# Patient Record
Sex: FEMALE | Race: BLACK OR AFRICAN AMERICAN | NOT HISPANIC OR LATINO | Employment: UNEMPLOYED | URBAN - METROPOLITAN AREA
[De-identification: names, ages, dates, MRNs, and addresses within clinical notes are randomized per-mention and may not be internally consistent; named-entity substitution may affect disease eponyms.]

---

## 2017-07-24 ENCOUNTER — HISTORICAL (OUTPATIENT)
Dept: ADMINISTRATIVE | Facility: HOSPITAL | Age: 57
End: 2017-07-24

## 2017-09-06 ENCOUNTER — HISTORICAL (OUTPATIENT)
Dept: NUTRITION | Facility: HOSPITAL | Age: 57
End: 2017-09-06

## 2018-03-09 ENCOUNTER — HISTORICAL (OUTPATIENT)
Dept: ADMINISTRATIVE | Facility: HOSPITAL | Age: 58
End: 2018-03-09

## 2019-11-05 ENCOUNTER — HISTORICAL (OUTPATIENT)
Dept: ADMINISTRATIVE | Facility: HOSPITAL | Age: 59
End: 2019-11-05

## 2019-11-05 LAB
BUN SERPL-MCNC: 10 MG/DL (ref 7–18)
CALCIUM SERPL-MCNC: 9.3 MG/DL (ref 8.5–10.1)
CHLORIDE SERPL-SCNC: 104 MMOL/L (ref 98–107)
CO2 SERPL-SCNC: 29 MMOL/L (ref 21–32)
CREAT SERPL-MCNC: 0.8 MG/DL (ref 0.6–1.3)
CREAT/UREA NIT SERPL: 12
EST. AVERAGE GLUCOSE BLD GHB EST-MCNC: 226 MG/DL
GLUCOSE SERPL-MCNC: 293 MG/DL (ref 74–106)
HBA1C MFR BLD: 9.5 % (ref 4.2–6.3)
POTASSIUM SERPL-SCNC: 3.5 MMOL/L (ref 3.5–5.1)
SODIUM SERPL-SCNC: 138 MMOL/L (ref 136–145)

## 2020-08-18 ENCOUNTER — HISTORICAL (OUTPATIENT)
Dept: INTERNAL MEDICINE | Facility: CLINIC | Age: 60
End: 2020-08-18

## 2021-08-11 ENCOUNTER — HISTORICAL (OUTPATIENT)
Dept: ADMINISTRATIVE | Facility: HOSPITAL | Age: 61
End: 2021-08-11

## 2021-11-22 ENCOUNTER — HISTORICAL (OUTPATIENT)
Dept: RADIOLOGY | Facility: HOSPITAL | Age: 61
End: 2021-11-22

## 2022-02-28 ENCOUNTER — HISTORICAL (OUTPATIENT)
Dept: FAMILY MEDICINE | Facility: CLINIC | Age: 62
End: 2022-02-28

## 2022-02-28 LAB
ABS NEUT (OLG): 7.32 (ref 2.1–9.2)
ALBUMIN SERPL-MCNC: 3.3 G/DL (ref 3.4–4.8)
ALBUMIN/GLOB SERPL: 0.8 {RATIO} (ref 1.1–2)
ALP SERPL-CCNC: 167 U/L (ref 40–150)
ALT SERPL-CCNC: 15 U/L (ref 0–55)
AST SERPL-CCNC: 14 U/L (ref 5–34)
BASOPHILS # BLD AUTO: 0.1 10*3/UL (ref 0–0.2)
BASOPHILS NFR BLD AUTO: 1 %
BILIRUB SERPL-MCNC: 0.4 MG/DL
BILIRUBIN DIRECT+TOT PNL SERPL-MCNC: 0.1 (ref 0–0.5)
BILIRUBIN DIRECT+TOT PNL SERPL-MCNC: 0.3 (ref 0–0.8)
BUN SERPL-MCNC: 15.9 MG/DL (ref 9.8–20.1)
CALCIUM SERPL-MCNC: 9.8 MG/DL (ref 8.7–10.5)
CHLORIDE SERPL-SCNC: 103 MMOL/L (ref 98–107)
CO2 SERPL-SCNC: 28 MMOL/L (ref 23–31)
CREAT SERPL-MCNC: 0.99 MG/DL (ref 0.55–1.02)
CREAT UR-MCNC: 56.4 MG/DL (ref 45–106)
EOSINOPHIL # BLD AUTO: 0.1 10*3/UL (ref 0–0.9)
EOSINOPHIL NFR BLD AUTO: 1 %
ERYTHROCYTE [DISTWIDTH] IN BLOOD BY AUTOMATED COUNT: 12.3 % (ref 11.5–14.5)
EST. AVERAGE GLUCOSE BLD GHB EST-MCNC: 309.2 MG/DL
FLAG2 (OHS): 70
FLAG3 (OHS): 80
FLAGS (OHS): 80
GLOBULIN SER-MCNC: 4.4 G/DL (ref 2.4–3.5)
GLUCOSE SERPL-MCNC: 382 MG/DL (ref 82–115)
HBA1C MFR BLD: 12.4 %
HCT VFR BLD AUTO: 40.1 % (ref 35–46)
HEMOLYSIS INTERF INDEX SERPL-ACNC: 1
HGB BLD-MCNC: 13.5 G/DL (ref 12–16)
ICTERIC INTERF INDEX SERPL-ACNC: 1
IMM GRANULOCYTES # BLD AUTO: 0.03 10*3/UL
IMM GRANULOCYTES NFR BLD AUTO: 0 %
LIPEMIC INTERF INDEX SERPL-ACNC: 8
LOW EVENT # SUSPECT FLAG (OHS): 80
LYMPHOCYTES # BLD AUTO: 2.1 10*3/UL (ref 0.6–4.6)
LYMPHOCYTES NFR BLD AUTO: 21 %
MANUAL DIFF? (OHS): NO
MCH RBC QN AUTO: 28.5 PG (ref 26–34)
MCHC RBC AUTO-ENTMCNC: 33.7 G/DL (ref 31–37)
MCV RBC AUTO: 84.8 FL (ref 80–100)
MICROALBUMIN UR-MCNC: 80.3
MICROALBUMIN/CREAT RATIO PNL UR: 142.4 (ref 0–30)
MO BLASTS SUSPECT FLAG (OHS): 40
MONOCYTES # BLD AUTO: 0.4 10*3/UL (ref 0.1–1.3)
MONOCYTES NFR BLD AUTO: 4 %
NEUTROPHILS # BLD AUTO: 7.32 10*3/UL (ref 2.1–9.2)
NEUTROPHILS NFR BLD AUTO: 72 %
NRBC BLD AUTO-RTO: 0 % (ref 0–0.2)
PLATELET # BLD AUTO: 277 10*3/UL (ref 130–400)
PLATELET CLUMPS SUSPECT FLAG (OHS): 10
PMV BLD AUTO: 11.9 FL (ref 7.4–10.4)
POTASSIUM SERPL-SCNC: 4 MMOL/L (ref 3.5–5.1)
PROT SERPL-MCNC: 7.7 G/DL (ref 5.8–7.6)
RBC # BLD AUTO: 4.73 10*6/UL (ref 4–5.2)
SODIUM SERPL-SCNC: 138 MMOL/L (ref 136–145)
TSH SERPL-ACNC: 0.4 M[IU]/L (ref 0.35–4.94)
VIT B12 SERPL-MCNC: 644 PG/ML (ref 213–816)
WBC # SPEC AUTO: 10.1 10*3/UL (ref 4.5–11)

## 2022-04-10 ENCOUNTER — HISTORICAL (OUTPATIENT)
Dept: ADMINISTRATIVE | Facility: HOSPITAL | Age: 62
End: 2022-04-10

## 2022-04-20 ENCOUNTER — HISTORICAL (OUTPATIENT)
Dept: FAMILY MEDICINE | Facility: CLINIC | Age: 62
End: 2022-04-20

## 2022-04-20 ENCOUNTER — HISTORICAL (OUTPATIENT)
Dept: ADMINISTRATIVE | Facility: HOSPITAL | Age: 62
End: 2022-04-20

## 2022-04-20 LAB
CHOLEST SERPL-MCNC: 269 MG/DL
CHOLEST/HDLC SERPL: 8 {RATIO} (ref 0–5)
HDLC SERPL-MCNC: 34 MG/DL (ref 35–60)
LDLC SERPL CALC-MCNC: 200 MG/DL (ref 50–140)
TRIGL SERPL-MCNC: 177 MG/DL (ref 37–140)
VLDLC SERPL CALC-MCNC: 35 MG/DL

## 2022-04-29 VITALS
HEIGHT: 62 IN | HEIGHT: 62 IN | BODY MASS INDEX: 29.62 KG/M2 | WEIGHT: 160.94 LBS | OXYGEN SATURATION: 100 % | SYSTOLIC BLOOD PRESSURE: 146 MMHG | BODY MASS INDEX: 31.89 KG/M2 | SYSTOLIC BLOOD PRESSURE: 159 MMHG | HEIGHT: 62 IN | BODY MASS INDEX: 29.94 KG/M2 | WEIGHT: 173.31 LBS | DIASTOLIC BLOOD PRESSURE: 92 MMHG | WEIGHT: 162.69 LBS | OXYGEN SATURATION: 100 % | DIASTOLIC BLOOD PRESSURE: 115 MMHG

## 2022-04-30 NOTE — PROGRESS NOTES
MNT OUTPATIENT EDUCATION   Nutrition Diagnosis  Problem:   Food & Nutrition Related Knowledge Deficit       Related to:  Medical Diagnosis  As Evidenced by:  Limited prior knowledge / health professional referral    Nutrition Prescription / Intervention  MNT Education Completed on:    Diabetes:  Instructed patient on carbohydrate containing food groups (starches, fruits, milk); portion sizes / measuring food; reading food labels; low fat meat selections; choosing healthier fats; increasing activity; sick day management; low fat tips on dining out; good eating habits; cooking healthier meals.       Appropriate meal plan provided: 2705-6995 calories     Level of Understanding:   good  Expected Compliance:   good  Appropriate Handouts Given: (age specific / literacy): yes  Barriers to Learning: none    Nutrition Prescription:  Diet order prescribed per MD / RD    Goal:  Patient will adhere to prescribed MNT meal plan as instructed by RD    Monitoring / Evaluation    RBERNABE will monitor: TONI

## 2022-05-02 ENCOUNTER — TELEPHONE (OUTPATIENT)
Dept: FAMILY MEDICINE | Facility: CLINIC | Age: 62
End: 2022-05-02

## 2022-05-02 NOTE — HISTORICAL [facility] CERNER
This is a historical note converted from Daniel. Formatting and pictures may have been removed.  Please reference Daniel for original formatting and attached multimedia. Chief Complaint  L breast bx  History of Present Illness  [facility] 0?focal symmetry in the left breast subsequent ultrasound showed an 8 mm?2 x 5 mm?was dilated duct proximal to mass  Review of Systems  No nipple discharge no nipple retraction?no bloody discharge from the nipple no family history of breast cancer  Physical Exam  Vitals & Measurements  T:?36.5? ?C ?(Oral)? HR:?78?(Peripheral)? BP:?159/115? HT:?157.48?cm? HT:?157.48?cm? WT:?78.6?kg? WT:?78.6?kg? BMI:?31.69?  No palpable breast mass  no palpable mass in the axilla  bilaterally?nipple retraction or skin changes  Assessment/Plan  Ordered:  US Breast Left Limited, Routine, 17 14:00:00 CDT, left breast mass, None, Ambulatory, Schedule this test, 17 14:00:00 CDT  Ultrasound in clinic today demonstrated no abnormal mass intraductally.  A thorough ultrasound of the entire left breast was completed and sent to the radiologist for review  The previous area of interest in the doubt in comparison to the?imaging from [facility] is no longer dilated there was nothing intraductal that was obvious on ultrasound  Patient will follow up in 6 months with repeat ultrasound   Problem List/Past Medical History  Obesity  Historical  No historical problems  Procedure/Surgical History  Hysterectomy (),  section, IZZY.  Medications  HYDROCHLOROTHIAZIDE 12.5 MG, 12.5 mg, 1 tab(s), Oral, Daily,? ?Not taking  hydroCHLOROthiazide 12.5 mg oral capsule, 12.5 mg, 1 cap(s), Oral, Daily, 2 refills,? ?Not taking  metformin 500 mg oral tablet, 500 mg, 1 tab(s), Oral, BID, 2 refills,? ?Not taking  NAPROXEN 500 MG TABLET, 500 mg, 1 tab(s), Oral, BID,? ?Not taking  Norvasc 5 mg oral tablet, 5 mg, 1 tab(s), Oral, Daily, 2 refills,? ?Not  taking  Allergies  No Known Medication Allergies  Social History  Alcohol - Denies Alcohol Use  Never  Employment/School  Unemployed  Home/Environment  Lives with Spouse. Alcohol abuse in household: No. Substance abuse in household: No. Smoker in household: No. Feels unsafe at home: No. Safe place to go: Yes. Family/Friends available for support: Yes.  Nutrition/Health  Regular, Sleeping concerns: No. Feels highly stressed: No.  Substance Abuse - Denies Substance Abuse  Never  Tobacco - Denies Tobacco Use  Never smoker  Family History  : Mother and Father.

## 2022-05-02 NOTE — HISTORICAL [facility] CERNER
This is a historical note converted from Daniel. Formatting and pictures may have been removed.  Please reference Daniel for original formatting and attached multimedia. Chief Complaint  DM2, HTN, HLD  History of Present Illness  60 Years?old??Female?presents to  Service Clinic?for routine f/u for DM2, HTN, HLD.??Patient ?has past medical history of HTN, HLD, DM2, NPDR , and DM2 neuropathy. Pt states she is having issues with her left eye requiring retinal specialist that she sees on 8/20/2021 at [facility]. Pt had procedures done on her right eye ( injections) by [facility]?Ophthalmology?and not willing to have anything else done until see by retinal specialist. Pt states right eyesight is worse.? Pt has been noncompliant with medical management for HTN, HLD, and DM2?but pt states she is willing to start medication with worsening of vision. Pt would like a handicap decal because she is having issues walking less than 200 ft without stopping and has mod to severe OA in past requiring injections. Pt states pain in knees intermittent but does not take medications.  Patient denies chest pain, shortness of breath,?dyspnea on exertion, palpitations, peripheral edema,?abdominal pain, nausea, vomiting,?diarrhea,?constipation, fatigue, fever, chills,?dysuria,? hematuria, melena,?or hematochezia.  ?   6/24/2021 visit  60 Years?old??Female?presents to C2???for routine f/u. ?Patient ?has past medical history of HTN, HLD, DM2, NPDR , and DM2 neuropathy.  Discussed with patient labs from 12/15/2020: Hemoglobin A1c 10.9 (previous A1c 12.1 on 8/18/2020); fasting lipid panel: Total cholesterol elevated 260, HDL 43, triglyceride elevated 164, and LDL elevated 184; urine microalbumin 21.8 and CBC within normal limits. Patient states that she is?more compliant with?a low?sodium, low fat and low carbohydrate eating habit. Patient states that she wants to meet with [facility] Dietician and is currently? modifying her eating habits on a  daily basis to decrease her diabetes and cholesterol issues. ?Blood pressure today 144/78 and slightly elevated. ?Patient states that she has?avoided?food containing?sodium?and that she does not add additional salt to her food. ??Patient is still noncompliant to medical recommendations for medication management of her uncontrolled diabetes mellitus type 2 and uncontrolled hyperlipidemia.? Discussed once again that these 2 factors as well as her age put her at a higher risk for stroke, myocardial infarction and death.? Patient states that she wants to try diet and exercise for the next 3 to 4 months and that she will consider taking medications at that time.  Patient denies chest pain, shortness of breath,?dyspnea on exertion, palpitations, peripheral edema,?abdominal pain, nausea, vomiting,?diarrhea,?constipation, fatigue, fever, chills,?dysuria,? hematuria, melena,?or hematochezia.  Counselling:  -Patient educated about?disease specific goals?(HTN:?Blood pressure?<140/90 except for ?patients with?DM2,?renal disease,?and CVD?BP?<130/80; HLD:Goal TChol <200,?LDL <130and  ? Triglycerides <150;?DM2:?HgA1c <7).  -Patient?educated about and encouraged to comply with disease specific?lifestyle modifications?:?HTN -low-sodium eating habit;?DM2-low carbohydrate eating habit; HLD-low-fat eating habit;?CVD?- Follow all lifestyle modifications and exercise for 30 minutes 5 days/week  -Patient educated?about?cardiovascular disease?prevention using?an aspirin 81 mg daily?(if not allergic to NSAIDs or severe renal impairment)-male:?44 yo or above and female: 54 yo or above  -Patient instructed to keep daily BP?log for review at next appointment  -Patient instructed to keep?daily?blood sugar log for review at next appointment  -Patient counseled to?keep follow-up appointment with disease specific specialist  -Relevant educational materials provided  ?  ?   Last visit 12/15/2020  Pt is a??60 Years?old?Female?in?to establish with  PCP. Pt has past medical history of HTN, HLD, DM2, NPDR , and DM2 neuropathy. ?Former patient of Dr. Lelo Henry. Discussed labs from 12/15/2020. ??Patient states that she was discharged from internal medicine clinic because of?noncompliance with medications.? Patient states she was prescribed Metformin 1000 mg p.o. twice daily for diabetes,?lisinopril/HCTZ 20/25 mg p.o.?daily?for?hypertension?and atorvastatin 40 mg?p.o. nightly?for which she did not take any of these medications.? Patient states she does not understand why she cannot control her?diabetes, hypertension, and hyperlipidemia?without medications.? Discussed in detail?low-sodium, low-carb?and low-fat eating habits?as well as patients caloric intake for height of approximately 5 feet?2 inches?which is?1400?joe.? Discussed with patient?going to the American Diabetic Association?website?to find healthy?alternatives and?possibly joining weight watchers?in order to lose?approximately 22 pounds?to decrease her BMI to 25?and possibly decrease her systolic blood pressure by?10?and decreased diastolic?blood pressure by 5.?Discussed in great detail HTN, pathophysiology, causes, adverse effects of disease (CVA, Angina/ MI, LVH, renal insufficiency/failure, headaches), dietary habits, exercise, goals for HTN and medication treatment.??Discussed low sodium diet <2000 mg po daily and ?DASH eating habit with patient. Pt advised to eat fresh or frozen ?vegetables and avoid vegetables in cans. Pt advised not to add salt or seasoned salts to foods and avoid foods with hidden salt (list discussed with patient).?Discussed with patient?low carbohydrate ADA diet,?pathophysiology of diabetes,?adverse effects of DM2 on overall health (MI, CVA, renal dysfunction, blindness, amputations, and neuropathy), treatments?-non pharmacological and pharmacological with medication side effects,?need for yearly?eye and foot examination,?and need for immunizations.? Patient seems  ?motivated?to start a healthy eating plan.? Discussed with patient and strongly advised that she still?comply with medication?recommendations.? Patient stated that she would like to try?diet and exercise?prior to starting?any type of medication.  DM2: HgA1c?(12/5/2020)?was??10.9. Pt is?moderately adheres to?ADA diet. ?  HTN:?Pt is?compliant with low sodium diet.?  HLD:?Pt is?compliant with low fat eating habit and exercise.?  Review of Systems  GENERAL:?Negative for abnormal weight loss or weight gain, fatigue, fever, or chills. Negative except for stated in HPI.  HEENT:??Negative for head trauma,?rhinorrhea, ?nasal congestion, sore throat or hearing deficit. Negative except for stated in HPI.  CARDIOVASCULAR: Negative for? chest pain, palpitations, WILLIS, PND, orthopnea, peripheral edema or syncope. Negative except for stated in HPI.  RESPIRATORY: Negative for SOB, WILLIS, cough or?wheezing.? Negative except for stated in HPI.  GASTROINTESTINAL: Negative for abdominal pain, nausea,??vomiting, diarrhea, constipation, heartburn, ?hematochezia? or melena.? Negative except for stated in HPI.  GENITOURINARY: Negative for dysuria, hematuria, urinary frequency, urinary urgency, urinary hesitancy or flank pain. Negative except for stated in HPI.  ENDOCRINE: Negative for fatigue, heat intolerance, cold intolerance, polyuria, polydipsia, or polyphagia.? Negative except for stated in HPI.  PSYCHOLOGICAL: Negative for depression, anxiety, suicidal or homicidal ideations, hallucinations??or?be.? Negative except for?stated in HPI.  MUSCULOSKELETAL:?? Negative except for?stated in HPI.  INTEGUMENT: Negative for rash?or lesions. Negative except for stated in HPI.  NEUROLOGY: Negative for headaches, dizziness, numbness, tingling,?vertigo?or gait disturbances. Negative except for stated in HPI.  Rest of 12 point ROS is negative except for HPI.  ?  Physical Exam  Vitals & Measurements  T:?36.5? ?C (Oral)? HR:?90(Peripheral)? RR:?20?  SpO2:?100%?  HT:?157.00?cm? WT:?73.800?kg? BMI:?29.94?  BP: Right arm 192/99; BP: left arm 172/119 after 10 minutes recheck was 172/92 in right arm  GENERAL:?Alert and oriented to person, place, time and situation,?NAD  HEENT: NCAT, Pupils equally round and reactive to light accommodation,?normal sclera, ?moist mucous membranes,?oropharynx normal,?normal nasal turbinates/ nares,??TM clear bilaterally, neck?supple,?thyroid normal,?no lymphadenopathy.  CARDIOVASCULAR:?Regular rateand?rhythm,? S1 and S2 normal;?no murmurs, no rubs, or no gallops; no carotid bruit.  RESPIRATORY:??Lungs clear to auscultation bilaterally;?no wheezes,?no rhonchi,?or? no crackles  ABDOMEN: Soft/ Nontender/ Nondistended; Bowel Sounds x4 - normoactive; no abdominal pulsatile mass, no masses, no hernias  EXTREMITY:? No clubbing, no cyanosis and?no peripheral edema; Dorsalis pedis and tibial ?pulses 2+  MUSCULOSKELETAL: FROM of Upper and Lower extremities; Strength 5/5 of Upper and Lower extremities  PSYCHOLOGICAL: Good judgement and insight; normal affect and mood.  NEUROLOGICAL: Normal gait and ?normal sensation;?no focal deficits; Cranial Nerves 2 -12 grossly intact  ?DM FOOT EXAM:?Normal sensation and normal proprioception,?no calluses,?no lesions_  Assessment/Plan  1.?Diabetes mellitus type 2, uncontrolled?E11.65  Chronic issue/ uncontrolled  Last hemoglobin A1c (12/15/2020):? 10.9 . Previous HgA1c (8/18/2020): 12.1  Urine microalbumin?(12/15/2020):? 21.8 ; Urine microalbumin (8/18/2020): 61.5  DM2 foot exam: 8/11/2021  DM2 ophthalmology?fundus exam:?6/23/2021 - PDR and HTN Stage III retinopathy; followed by [facility]  Medication?regimen:  Patient noncompliant with medical management of DM2 until todays exam 8/11/2021; Pt has verbally agreed to be compliant with medication management starting today 8/11/20  Start Invokamet 50/1000 mg po BID  DM2 guidance:  ACE inhibitor?or ARB?for renal protection:?Yes, started today  ASA 81 mg  p.o. daily?for CVD protection:?Yes,started today  Statin?for CVD protection:?Yes,started today  Continue low carbohydrate ADA eating habit  Continue to monitor CBS daily with routine:M, W, F- fasting; Tues and Thursday- bedtime; Sat and Sun- 2 hrs after lunch  Patient educated about signs and symptoms of hypoglycemia;?patient educated about?prevention?and management of hypoglycemia.  Patient expressed verbal understanding and agreement with the above medical management?and treatment  Referred to Diabetic Education for additional training on nutrition  Labs ordered?today?see below  Ordered:  canagliflozin-metformin, 1 tab(s), Oral, BIDWMeal, # 60 tab(s), 5 Refill(s), [facility], 157, cm, Height/Length Dosing, 08/11/21 14:29:00 CDT, 73.8, kg, Weight Dosing, 08/11/21 14:29:00 CDT  CBC w/ Auto Diff, Routine collect, *Est. 08/11/21 3:00:00 CDT, Blood, Order for future visit, *Est. Stop date 08/11/21 3:00:00 CDT, Lab Collect, HTN (hypertension)  HLD (hyperlipidemia)  Diabetes mellitus type 2, uncontrolled, 08/11/21 15:36:00 CDT  Clinic Follow up, *Est. 08/25/21 3:00:00 CDT, Order for future visit, HLD (hyperlipidemia)  HTN (hypertension)  Diabetes mellitus type 2, uncontrolled  NPDR (nonproliferative diabetic retinopathy), [facility]  Comprehensive Metabolic Panel, Routine collect, *Est. 08/11/21 3:00:00 CDT, Blood, Order for future visit, *Est. Stop date 08/11/21 3:00:00 CDT, Lab Collect, HTN (hypertension)  HLD (hyperlipidemia)  Diabetes mellitus type 2, uncontrolled, 08/11/21 15:36:00 CDT  Microalbum/Creatinine Ratio Urine (Microalb/Creat), Routine collect, Urine, Order for future visit, *Est. 08/11/21 3:00:00 CDT, *Est. Stop date 08/11/21 3:00:00 CDT, Nurse collect, Diabetes mellitus type 2, uncontrolled  Office/Outpatient Visit Level 4 Established 31184 PC, HLD (hyperlipidemia)  HTN (hypertension)  Diabetes mellitus type 2, uncontrolled  NPDR (nonproliferative  diabetic retinopathy)  Screening for malignant neoplasm of breast  Screening for malignant neoplasm of colon  Medical non-compliance, [facility] F...  ?  2.?HLD (hyperlipidemia)?E78.5  Chronic issue/ uncontrolled  Continue?low fat diet.  Noncompliant with medical management of hyperlipidemia until 8/11/2021  Pt has verbally agreed to be compliant with medication management starting today 8/11/2021  Encourage exercise for 30 minutes 5 days/ week (total 150 minutes/week)  Start atorvastatin? 40 mg po daily  Labs ordered?today?see below  ?  Ordered:  atorvastatin, 40 mg = 1 tab(s), Oral, Daily, # 30 tab(s), 5 Refill(s), Pharm[facility]: [facility], 157, cm, Height/Length Dosing, 08/11/21 14:29:00 CDT, 73.8, kg, Weight Dosing, 08/11/21 14:29:00 CDT  CBC w/ Auto Diff, Routine collect, *Est. 08/11/21 3:00:00 CDT, Blood, Order for future visit, *Est. Stop date 08/11/21 3:00:00 CDT, Lab Collect, HTN (hypertension)  HLD (hyperlipidemia)  Diabetes mellitus type 2, uncontrolled, 08/11/21 15:36:00 CDT  Clinic Follow up, *Est. 08/25/21 3:00:00 CDT, Order for future visit, HLD (hyperlipidemia)  HTN (hypertension)  Diabetes mellitus type 2, uncontrolled  NPDR (nonproliferative diabetic retinopathy), [facility]  Comprehensive Metabolic Panel, Routine collect, *Est. 08/11/21 3:00:00 CDT, Blood, Order for future visit, *Est. Stop date 08/11/21 3:00:00 CDT, Lab Collect, HTN (hypertension)  HLD (hyperlipidemia)  Diabetes mellitus type 2, uncontrolled, 08/11/21 15:36:00 CDT  Lipid Panel, Routine collect, *Est. 08/11/21 3:00:00 CDT, Blood, Order for future visit, *Est. Stop date 08/11/21 3:00:00 CDT, Lab Collect, HTN (hypertension)  HLD (hyperlipidemia), 08/11/21 15:36:00 CDT  Office/Outpatient Visit Level 4 Established [id number] PC, HLD (hyperlipidemia)  HTN (hypertension)  Diabetes mellitus type 2, uncontrolled  NPDR (nonproliferative diabetic retinopathy)  Screening for malignant neoplasm of breast   Screening for malignant neoplasm of colon  Medical non-compliance, [facility] F...  ?  3.?HTN (hypertension)?I10  Chronic issue/ uncontrolled?  Goal BP <140/90  Continue to?monitor BP daily and bring in log at next visit  Continue low sodium eating habit of ?less than 2 grams/ day/Dash eating habit  Encourage exercise for 30 minutes 5 days/ week (total 150 minutes/week)  Start amlodipine- benazepril 10/20 mg po daily  Labs ordered?today?see below  Ordered:  amlodipine-benazepril, 1 cap(s), Oral, Daily, Hold for BP less than or equal to 90/50., # 30 cap(s), 5 Refill(s), Pharmacy: [facility], 157, cm, Height/Length Dosing, 08/11/21 14:29:00 CDT, 73.8, kg, Weight Dosing, 08/11/21 14:29:00 CDT  CBC w/ Auto Diff, Routine collect, *Est. 08/11/21 3:00:00 CDT, Blood, Order for future visit, *Est. Stop date 08/11/21 3:00:00 CDT, Lab Collect, HTN (hypertension)  HLD (hyperlipidemia)  Diabetes mellitus type 2, uncontrolled, 08/11/21 15:36:00 CDT  Clinic Follow up, *Est. 08/25/21 3:00:00 CDT, Order for future visit, HLD (hyperlipidemia)  HTN (hypertension)  Diabetes mellitus type 2, uncontrolled  NPDR (nonproliferative diabetic retinopathy), [facility]  Comprehensive Metabolic Panel, Routine collect, *Est. 08/11/21 3:00:00 CDT, Blood, Order for future visit, *Est. Stop date 08/11/21 3:00:00 CDT, Lab Collect, HTN (hypertension)  HLD (hyperlipidemia)  Diabetes mellitus type 2, uncontrolled, 08/11/21 15:36:00 CDT  Hemoglobin A1C [facility], Routine collect, *Est. 08/11/21 3:00:00 CDT, Blood, Order for future visit, *Est. Stop date 08/11/21 3:00:00 CDT, Lab Collect, HTN (hypertension), 08/11/21 15:36:00 CDT  Lipid Panel, Routine collect, *Est. 08/11/21 3:00:00 CDT, Blood, Order for future visit, *Est. Stop date 08/11/21 3:00:00 CDT, Lab Collect, HTN (hypertension)  HLD (hyperlipidemia), 08/11/21 15:36:00 CDT  Office/Outpatient Visit Level 4 Established 49616 PC, HLD (hyperlipidemia)  HTN  (hypertension)  Diabetes mellitus type 2, uncontrolled  NPDR (nonproliferative diabetic retinopathy)  Screening for malignant neoplasm of breast  Screening for malignant neoplasm of colon  Medical non-compliance, [facility] F...  Thyroid Stimulating Hormone, Routine collect, *Est. 08/11/21 3:00:00 CDT, Blood, Order for future visit, *Est. Stop date 08/11/21 3:00:00 CDT, Lab Collect, HTN (hypertension), 08/11/21 15:36:00 CDT  ?  4.?Medical non-compliance?Z91.19  ?Pt has been noncompliant with medical management of DM2, HTN, HLD wanting to attempt diet and exercise. Today, 8/11/2021, pt states she will comply with new medication regimen. She has been started on antihypertensives, DM2 medications, a statin and ASA.  Ordered:  Office/Outpatient Visit Level 4 Established 44864 PC, HLD (hyperlipidemia)  HTN (hypertension)  Diabetes mellitus type 2, uncontrolled  NPDR (nonproliferative diabetic retinopathy)  Screening for malignant neoplasm of breast  Screening for malignant neoplasm of colon  Medical non-compliance, [facility] F...  ?  5.?NPDR (nonproliferative diabetic retinopathy)?E11.3299  ?Keep?follow up appointment?with?[facility] clinic  Ordered:  Clinic Follow up, *Est. 08/25/21 3:00:00 CDT, Order for future visit, HLD (hyperlipidemia)  HTN (hypertension)  Diabetes mellitus type 2, uncontrolled  NPDR (nonproliferative diabetic retinopathy), [facility]  Office/Outpatient Visit Level 4 Established 43952 PC, HLD (hyperlipidemia)  HTN (hypertension)  Diabetes mellitus type 2, uncontrolled  NPDR (nonproliferative diabetic retinopathy)  Screening for malignant neoplasm of breast  Screening for malignant neoplasm of colon  Medical non-compliance, [facility] F...  ?  6.?Osteoarthritis of knees, bilateral?M17.0  Chronic issue  Pt request DMV parking decal  She states she has a h/o severe OA; this is the first time patient has discussed her knee OA and  Service Clinic has no radiographs of the  patients knees  XR Knee Bilateral standing ordered  Ordered:  XR Knees Bilateral Standing AP, Routine, *Est. 08/11/21 3:00:00 CDT, None, Ambulatory, Rad Type, Order for future visit, Osteoarthritis of knees, bilateral, Not Scheduled, *Est. 08/11/21 3:00:00 CDT  ?  7.?Screening for malignant neoplasm of breast?Z12.39  ?MMG ordered  Ordered:  MG Screening Bilateral, Routine, 08/11/21 15:32:00 CDT, Screening, None, Ambulatory, Rad Type, Order for future visit, Screening for malignant neoplasm of breast, Schedule this test, [facility], Follow Breast Imaging Order Set Protocol, 08/11/21 15:32:00...  Office/Outpatient Visit Level 4 Established 85020 PC, HLD (hyperlipidemia)  HTN (hypertension)  Diabetes mellitus type 2, uncontrolled  NPDR (nonproliferative diabetic retinopathy)  Screening for malignant neoplasm of breast  Screening for malignant neoplasm of colon  Medical non-compliance, [facility] F...  ?  8.?Screening for malignant neoplasm of colon?Z12.11  ?FIT ordered; FIT?kit given to the patient; and?the pt was instructed by [facility] staff on proper collection?and ?return of the kit ?to [facility]?main laboratory?within?2 weeks  Ordered:  Occult Blood Fecal Immunoassay, Routine collect, Stool, Order for future visit, *Est. 08/11/21 3:00:00 CDT, *Est. Stop date 08/11/21 3:00:00 CDT, Nurse collect, Screening for malignant neoplasm of colon  Office/Outpatient Visit Level 4 Established 68129 PC, HLD (hyperlipidemia)  HTN (hypertension)  Diabetes mellitus type 2, uncontrolled  NPDR (nonproliferative diabetic retinopathy)  Screening for malignant neoplasm of breast  Screening for malignant neoplasm of colon  Medical non-compliance, [facility] F...  ?  Orders:  aspirin, 81 mg = 1 tab(s), Chewed, Daily, # 30 tab(s), 5 Refill(s), Pharmacy: [facility], 157, cm, Height/Length Dosing, 08/11/21 14:29:00 CDT, 73.8, kg, Weight Dosing, 08/11/21 14:29:00 CDT  RTC in 2 wks for DM2 and  HTN  Referrals  [facility] Internal Referral to Diabetes Education, Specialty: Diabetes Education, Reason: 61 yo female with DM2 uncontrolled needing diabetic education regard nutrition- healthy diabetic foods and meal preps to control DM2. Please eval and treat., Refer To: Referral, Diabetes Education, [facility] Diabetes E...  Clinic Follow up, *Est. 21 3:00:00 CDT, Order for future visit, HLD (hyperlipidemia)  HTN (hypertension)  Diabetes mellitus type 2, uncontrolled  NPDR (nonproliferative diabetic retinopathy), [facility]   Problem List/Past Medical History  Ongoing  Diabetes  Diabetes mellitus type 2, uncontrolled  HLD (hyperlipidemia)  HTN (hypertension)  Medical non-compliance  NPDR (nonproliferative diabetic retinopathy)  Obesity  Well adult exam  Historical  No qualifying data  Procedure/Surgical History  Hysterectomy ()   section  IZZY   Medications  amlodipine-benazepril 10 mg-20 mg oral capsule, 1 cap(s), Oral, Daily, 5 refills  aspirin 81 mg oral tablet, CHEWABLE, 81 mg= 1 tab(s), Chewed, Daily, 5 refills  atorvastatin 40 mg oral tablet, 40 mg= 1 tab(s), Oral, Daily, 5 refills  Blood pressure cuff, See Instructions  diabetic supplies- alcohol pads, See Instructions, 11 refills  Diabetic supplies- lancets, See Instructions, 11 refills  diabetic supplies- test strips, See Instructions, 11 refills  Glucometer, See Instructions  Invokamet 50 mg-1000 mg oral tablet, 1 tab(s), Oral, BIDWMeal, 5 refills  phenylephrine 2.5% ophthalmic solution, 1 drop(s), Eye-Both, Once  tropicamide 1% ophthalmic soln, 1 drop(s), Eye-Both, Once  Allergies  No Known Allergies  No Known Medication Allergies  Social History  Abuse/Neglect  No, No, Yes, 2021  Alcohol - Denies Alcohol Use, 2016  Never, 2021  Employment/School  Unemployed, 2020  Exercise  Exercise duration: 0., 2021  Exercise type: denies., 2020  Financial/Legal Situation  None,  12/15/2020  Home/Environment  Lives with Spouse. Living situation: Home/Independent., 2020    Never in , 12/15/2020  Nutrition/Health  Regular, Good, 2020  Sexual  Gender Identity Identifies as female., 2020  Spiritual/Cultural  Non denomination, 12/15/2020  Substance Use - Denies Substance Abuse, 2016  Never, 2020  Tobacco - Denies Tobacco Use, 2016  Never (less than 100 in lifetime), N/A, 2021  Family History  : Mother and Father.  Health Maintenance  Health Maintenance  ???Pending?(in the next year)  ??? ??OverDue  ??? ? ? ?Hypertension Maintenance-Medication Prescribed due??18??and every 1??year(s)  ??? ? ? ?Diabetes Maintenance-Foot Exam due??18??and every 1??year(s)  ??? ??Due?  ??? ? ? ?Breast Cancer Screening due??21??Unknown Frequency  ??? ? ? ?Colorectal Screening due??21??Unknown Frequency  ??? ? ? ?Zoster Vaccine due??21??Unknown Frequency  ??? ??Refused?  ??? ? ? ?Tetanus Vaccine due??21??and every 10??year(s)  ??? ??Due In Future?  ??? ? ? ?Diabetes Maintenance-HgbA1c not due until??12/15/21??and every 1??year(s)  ??? ? ? ?Hypertension Management-BMP not due until??12/15/21??and every 1??year(s)  ??? ? ? ?Diabetes Maintenance-Fasting Lipid Profile not due until??12/15/21??and every 1??year(s)  ??? ? ? ?Diabetes Maintenance-Serum Creatinine not due until??12/15/21??and every 1??year(s)  ??? ? ? ?Obesity Screening not due until??22??and every 1??year(s)  ??? ? ? ?Alcohol Misuse Screening not due until??22??and every 1??year(s)  ??? ? ? ?Blood Pressure Screening not due until??22??and every 1??year(s)  ??? ? ? ?Hypertension Management-Blood Pressure not due until??22??and every 1??year(s)  ??? ? ? ?Diabetes Maintenance-Eye Exam not due until??22??and every 1??year(s)  ???Satisfied?(in the past 1 year)  ??? ??Satisfied?  ??? ? ? ?ADL Screening on??21.??Satisfied by  Elias LPN, [place]  ??? ? ? ?Alcohol Misuse Screening on??02/22/21.??Satisfied by Cassie Gallardo LPN  ??? ? ? ?Aspirin Therapy for CVD Prevention on??08/11/21.??Satisfied by Darian Burger MD  ??? ? ? ?Blood Pressure Screening on??02/22/21.??Satisfied by Cassie Gallardo LPN  ??? ? ? ?Body Mass Index Check on??08/11/21.??Satisfied by Grady Griffin LPN  ??? ? ? ?Depression Screening on??08/11/21.??Satisfied by Grady Griffin LPN  ??? ? ? ?Diabetes Maintenance-Eye Exam on??07/02/21.??Satisfied by Camilla Carrasco  ??? ? ? ?Diabetes Maintenance-Microalbumin on??12/15/20.??Satisfied by Cecy Almonte  ??? ? ? ?Diabetes Maintenance-Fasting Lipid Profile on??12/15/20.??Satisfied by Cecy Almonte  ??? ? ? ?Diabetes Maintenance-HgbA1c on??12/15/20.??Satisfied by Cecy Almonte  ??? ? ? ?Diabetes Maintenance-Serum Creatinine on??12/15/20.??Satisfied by Cecy Almonte  ??? ? ? ?Diabetes Screening on??12/15/20.??Satisfied by Cecy Almonte  ??? ? ? ?Hypertension Management-Education on??08/11/21.??Satisfied by Darian Burger MD  ??? ? ? ?Influenza Vaccine on??03/30/21.??Satisfied by Shell Lozano  ??? ? ? ?Lipid Screening on??12/15/20.??Satisfied by Cecy Almonte  ??? ? ? ?Obesity Screening on??08/11/21.??Satisfied by Grady Griffin LPN  ??? ??Refused?  ??? ? ? ?Tetanus Vaccine on??08/11/21.??Recorded by Grady Griffin LPN??Reason: Patient Refuses  ??? ? ? ?Zoster Vaccine on??08/11/21.??Recorded by Grady Griffin LPN??Reason: Patient Refuses  ?

## 2022-05-02 NOTE — HISTORICAL [facility]ER
This is a historical note converted from Daniel. Formatting and pictures may have been removed.  Please reference Daniel for original formatting and attached multimedia. Chief Complaint  needs PCP  History of Present Illness  58-year-old -American female presents?to reestablish care in the internal medicine clinic; she had previously been seeing nurse practitioner Ashley Angela. ?She has a past medical history of hypertension, hyperlipidemia,?type 2 diabetes mellitus,?and medication noncompliance.? Patient states she has all of her medications at home?but has chosen not to take any of them?for the past year or so.? Denies any complaints today such as chest pain, shortness of breath, fatigue,?etc.  Patient declined mammogram?and referral to gynecology clinic for pelvic exam, as well as all vaccinations.  Review of Systems  ????Constitutional: No fever, No chills, No weakness, No fatigue.  ?????Eye: No recent visual problem.  ?????Respiratory: No shortness of breath, No cough, No sputum production, No wheezing.  ?????Cardiovascular: No chest pain, No palpitations, No peripheral edema.  ?????Gastrointestinal: No nausea, No vomiting, No diarrhea, No constipation, No heartburn, No abdominal pain.  ?????Genitourinary: No dysuria.  ?????Endocrine: No excessive thirst, No polyuria, No cold intolerance, No heat intolerance.  ?????Musculoskeletal: No back pain, No joint pain, No decreased range of motion.  ?????Integumentary: No rash, No pruritus.  ?????Neurologic: Alert and oriented X4, No numbness, No tingling, No headache.  ?????Psychiatric: No anxiety, No depression.  Physical Exam  Vitals & Measurements  T:?36.7? ?C (Oral)? HR:?98(Peripheral)? RR:?18? BP:?192/115?  HT:?157?cm? WT:?74.7?kg? BMI:?30.31?  General: Alert and oriented, No acute distress.  ?????Eye: Pupils are equal, round and reactive to light, Extraocular movements are intact, Normal conjunctiva.  ?????HENT: Normocephalic, Oral mucosa is moist, No  pharyngeal erythema.  ?????Neck: Supple, Non-tender.  ?????Respiratory: Lungs are clear to auscultation, Respirations are non-labored.  ?????Cardiovascular: Normal rate, Regular rhythm, No murmur, Good pulses equal in all extremities, No edema.  ?????Gastrointestinal: Soft, Non-tender, Non-distended, Normal bowel sounds.  ?????Musculoskeletal: Normal range of motion, Normal strength.  ?????Integumentary: Warm, Dry.  ?????Neurologic: Alert, Oriented.  ?????Cognition and Speech: Oriented, Speech clear and coherent.  ?????Psychiatric: Cooperative, Appropriate mood & affect.  Assessment/Plan  HLD (hyperlipidemia)?E78.5  Ordered:  atorvastatin, 40 mg = 1 tab(s), Oral, Daily, # 30 tab(s), 3 Refill(s), Pharmacy: [facility]  ?  Hypertension?I10  Ordered:  hydrochlorothiazide-lisinopril, 1 tab(s), Oral, Daily, # 30 tab(s), 3 Refill(s), Pharmacy: [facility]  CBC w/ Auto Diff, Routine collect, *Est. 02/05/20 3:00:00 CST, Blood, Order for future visit, *Est. Stop date 02/05/20 3:00:00 CST, Lab Collect, Wellness examination  Type 2 diabetes mellitus  Hypertension  Mixed hyperlipidemia, 11/05/19 12:39:00 CST  Clinic Follow up, *Est. 02/12/20 3:00:00 CST, Order for future visit, Wellness examination  Type 2 diabetes mellitus  Hypertension  Mixed hyperlipidemia, [facility]  Clinic Follow up, *Est. 02/05/20 3:00:00 CST, FASTING labs with Anabel, Order for future visit, Wellness examination  Type 2 diabetes mellitus  Hypertension  Mixed hyperlipidemia, [facility]  Clinic Follow up, *Est. 11/12/19 3:00:00 CST, BP check with nurse Guevara, Order for future visit, Hypertension, [facility]  Comprehensive Metabolic Panel, Routine collect, *Est. 02/05/20 3:00:00 CST, Blood, Order for future visit, *Est. Stop date 02/05/20 3:00:00 CST, Lab Collect, Wellness examination  Type 2 diabetes mellitus  Hypertension  Mixed hyperlipidemia, 11/05/19 12:39:00 CST  Hemoglobin A1C [facility], Routine collect, *Est.  02/05/20 3:00:00 CST, Blood, Order for future visit, *Est. Stop date 02/05/20 3:00:00 CST, Lab Collect, Wellness examination  Type 2 diabetes mellitus  Hypertension  Mixed hyperlipidemia, 11/05/19 12:39:00 CST  Lipid Panel, Routine collect, *Est. 02/05/20 3:00:00 CST, Blood, Order for future visit, *Est. Stop date 02/05/20 3:00:00 CST, Lab Collect, Wellness examination  Type 2 diabetes mellitus  Hypertension  Mixed hyperlipidemia, 11/05/19 12:39:00 CST  Microalbum/Creatinine Ratio Urine (Microalb/Creat), Routine collect, Urine, Order for future visit, *Est. 02/05/20 3:00:00 CST, *Est. Stop date 02/05/20 3:00:00 CST, Nurse collect, Wellness examination  Type 2 diabetes mellitus  Hypertension  Mixed hyperlipidemia  ?  Orders:  Basic Metabolic Panel, Routine collect, *Est. 11/05/19 3:00:00 CST, Blood, Order for future visit, *Est. Stop date 11/05/19 3:00:00 CST, Lab Collect, Type 2 diabetes mellitus, 11/05/19 12:39:00 CST  Hemoglobin A1C [facility], Routine collect, *Est. 11/05/19 3:00:00 CST, Blood, Order for future visit, *Est. Stop date 11/05/19 3:00:00 CST, Lab Collect, Type 2 diabetes mellitus, 11/05/19 12:39:00 CST  ?  1. ?Type 2 diabetes mellitus:?BMP and HbA1c today.  Nurse will call patient later today with?lab results and medication instructions.  2. ?Hypertension:?Patient is driving herself back home to [place] and says that clonidine has made her very drowsy in the past?and?does not want to take it.? As she is clinically stable and asymptomatic, I have refilled?her lisinopril-hydrochlorothiazide and emphasized that she should pick it up at the pharmacy?at this facility?immediately and start taking it as soon as she gets home.? Patient voiced understanding.  3. ?Hyperlipidemia:?Refilled atorvastatin to be taken daily, advised on low-cholesterol diet.? Fasting labs one week prior to follow-up appointment.  4. ?Wellness: Fasting labs one week prior to follow-up appointment in 3 months.  5.? Medical  noncompliance:?Strongly advised patient to?take all medications as prescribed, particularly her antihypertensives?as?noncompliance can lead to?heart attack, stroke,?renal failure,?and even death. ?I also explained that if she?continues to be noncompliant, I will discharge her from my clinic and she will need to establish care with a new?PCP. ?Patient voiced understanding.  ?  Screening: Mammogram:?Patient declined.  ??????????????? Pelvic exam:?Patient declined.  ????????????????Colon cancer screening:?Will discuss during follow-up appointment.  ?  Patient voiced understanding.   Problem List/Past Medical History  Ongoing  Diabetes  HLD (hyperlipidemia)  HTN (hypertension)  Medical non-compliance  Obesity  Well adult exam  Historical  No qualifying data  Procedure/Surgical History  Hysterectomy ()   section  IZZY   Medications  atorvastatin 40 mg oral tablet, 40 mg= 1 tab(s), Oral, Daily, 3 refills  hydrochlorothiazide-lisinopril 25 mg-20 mg oral tablet, 1 tab(s), Oral, Daily, 3 refills  metFORMIN 1000 mg oral tablet, 1000 mg= 1 tab(s), Oral, BID, 3 refills,? ?Not taking  Allergies  No Known Medication Allergies  Social History  Abuse/Neglect  No, No, Yes, 2019  Alcohol - Denies Alcohol Use, 2016  Never, 2019  Employment/School  Unemployed, 2019  Exercise  Exercise type: denies., 2019  Home/Environment  Lives with Spouse. Living situation: Home/Independent., 2019  Nutrition/Health  Regular, Good, 2019  Sexual  Gender Identity Identifies as female., 2019  Substance Use - Denies Substance Abuse, 2016  Never, 2019  Tobacco - Denies Tobacco Use, 2016  Never (less than 100 in lifetime), N/A, 2019  Family History  : Mother and Father.  Health Maintenance  Health Maintenance  ???Pending?(in the next year)  ??? ??OverDue  ??? ? ? ?Hypertension Maintenance-Medication Prescribed due??18??and every 1??year(s)  ??? ? ? ?Diabetes  Maintenance-Foot Exam due??06/21/18??and every 1??year(s)  ??? ? ? ?Alcohol Misuse Screening due??01/01/19??and every 1??year(s)  ??? ? ? ?Diabetes Maintenance-Fasting Lipid Profile due??03/27/19??and every 1??year(s)  ??? ? ? ?Diabetes Maintenance-HgbA1c due??03/27/19??and every 1??year(s)  ??? ? ? ?Hypertension Management-BMP due??03/27/19??and every 1??year(s)  ??? ? ? ?Diabetes Maintenance-Serum Creatinine due??03/27/19??and every 1??year(s)  ??? ? ? ?Diabetes Maintenance-Urine Dipstick due??03/27/19??and every 1??year(s)  ??? ? ? ?Blood Pressure Screening due??04/26/19??and every 1??year(s)  ??? ? ? ?Depression Screening due??04/26/19??and every 1??year(s)  ??? ? ? ?Hypertension Management-Blood Pressure due??04/26/19??and every 1??year(s)  ??? ??Due?  ??? ? ? ?Aspirin Therapy for CVD Prevention due??11/05/19??and every 1??year(s)  ??? ? ? ?Breast Cancer Screening due??11/05/19??and every?  ??? ? ? ?Cervical Cancer Screening due??11/05/19??and every?  ??? ? ? ?Colorectal Screening due??11/05/19??and every?  ??? ? ? ?Hypertension Management-Education due??11/05/19??and every 1??year(s)  ??? ??Refused?  ??? ? ? ?Tetanus Vaccine due??11/05/19??and every 10??year(s)  ??? ??Due In Future?  ??? ? ? ?Obesity Screening not due until??01/01/20??and every 1??year(s)  ??? ? ? ?Body Mass Index Check not due until??09/24/20??and every 1??year(s)  ??? ? ? ?Diabetes Maintenance-Eye Exam not due until??09/24/20??and every 1??year(s)  ???Satisfied?(in the past 1 year)  ??? ??Satisfied?  ??? ? ? ?ADL Screening on??11/05/19.??Satisfied by Rabia Romero LPN  ??? ? ? ?Blood Pressure Screening on??11/05/19.??Satisfied by Rabia Romero LPN  ??? ? ? ?Body Mass Index Check on??11/05/19.??Satisfied by Rabia Romero LPN  ??? ? ? ?Depression Screening on??11/05/19.??Satisfied by Rabia Romero LPN  ??? ? ? ?Diabetes Maintenance-Eye Exam on??09/25/19.??Satisfied by Shawn Lou MD  ??? ? ? ?Hypertension Management-Blood  Pressure on??11/05/19.??Satisfied by Rabia Romero LPN  ??? ? ? ?Influenza Vaccine on??02/04/19.??Satisfied by Zonia Cardoza LPN  ??? ? ? ?Obesity Screening on??11/05/19.??Satisfied by Rabia Romero LPN  ??? ??Refused?  ??? ? ? ?Tetanus Vaccine on??11/05/19.??Recorded by Rabia Romero LPN??Reason: Patient Refuses  ?      Referral to diabetes education class.  Return to clinic in 1 week for BP check with nurse.

## 2022-05-11 ENCOUNTER — TELEPHONE (OUTPATIENT)
Dept: FAMILY MEDICINE | Facility: CLINIC | Age: 62
End: 2022-05-11

## 2022-05-12 DIAGNOSIS — E11.9 TYPE 2 DIABETES MELLITUS WITHOUT COMPLICATION, WITHOUT LONG-TERM CURRENT USE OF INSULIN: Primary | ICD-10-CM

## 2022-05-16 ENCOUNTER — TELEPHONE (OUTPATIENT)
Dept: DIABETES | Facility: CLINIC | Age: 62
End: 2022-05-16

## 2022-05-16 NOTE — TELEPHONE ENCOUNTER
Pt called with questions re: ordering of Freestyle Lily.  Spoke with pt & physician's office staff on phone & explained that ordering of CGM must come from physician managing diabetes. Provided callback number for physician's office staff for questions.  Pt also interested in being seen for diabetes education.  Awaiting referral.

## 2022-06-01 ENCOUNTER — OFFICE VISIT (OUTPATIENT)
Dept: FAMILY MEDICINE | Facility: CLINIC | Age: 62
End: 2022-06-01

## 2022-06-01 VITALS
SYSTOLIC BLOOD PRESSURE: 144 MMHG | DIASTOLIC BLOOD PRESSURE: 82 MMHG | WEIGHT: 161.38 LBS | TEMPERATURE: 98 F | RESPIRATION RATE: 18 BRPM | OXYGEN SATURATION: 100 % | HEART RATE: 91 BPM | BODY MASS INDEX: 29.7 KG/M2

## 2022-06-01 DIAGNOSIS — M17.0 PRIMARY OSTEOARTHRITIS OF BOTH KNEES: ICD-10-CM

## 2022-06-01 DIAGNOSIS — E11.65 UNCONTROLLED TYPE 2 DIABETES MELLITUS WITH HYPERGLYCEMIA: Primary | ICD-10-CM

## 2022-06-01 DIAGNOSIS — I10 PRIMARY HYPERTENSION: ICD-10-CM

## 2022-06-01 DIAGNOSIS — E78.2 MIXED HYPERLIPIDEMIA: ICD-10-CM

## 2022-06-01 DIAGNOSIS — Z91.199 GENERAL PATIENT NONCOMPLIANCE: ICD-10-CM

## 2022-06-01 PROBLEM — E11.3591 PROLIFERATIVE DIABETIC RETINOPATHY OF RIGHT EYE ASSOCIATED WITH TYPE 2 DIABETES MELLITUS: Status: ACTIVE | Noted: 2022-06-01

## 2022-06-01 PROBLEM — E66.9 OBESITY: Status: ACTIVE | Noted: 2022-06-01

## 2022-06-01 PROBLEM — E11.3299 NONPROLIFERATIVE DIABETIC RETINOPATHY: Status: ACTIVE | Noted: 2022-06-01

## 2022-06-01 PROBLEM — E11.311 DIABETIC MACULAR EDEMA WITH RETINOPATHY ASSOCIATED WITH TYPE 2 DIABETES MELLITUS: Status: ACTIVE | Noted: 2022-06-01

## 2022-06-01 PROCEDURE — 99215 OFFICE O/P EST HI 40 MIN: CPT | Mod: PBBFAC | Performed by: FAMILY MEDICINE

## 2022-06-01 PROCEDURE — 99215 OFFICE O/P EST HI 40 MIN: CPT | Mod: S$PBB,,, | Performed by: FAMILY MEDICINE

## 2022-06-01 PROCEDURE — 99215 PR OFFICE/OUTPT VISIT, EST, LEVL V, 40-54 MIN: ICD-10-PCS | Mod: S$PBB,,, | Performed by: FAMILY MEDICINE

## 2022-06-01 RX ORDER — FLASH GLUCOSE SENSOR
1 KIT MISCELLANEOUS CONTINUOUS
Qty: 2 KIT | Refills: 11 | Status: SHIPPED | OUTPATIENT
Start: 2022-06-01 | End: 2022-06-29

## 2022-06-01 RX ORDER — AMLODIPINE AND BENAZEPRIL HYDROCHLORIDE 10; 20 MG/1; MG/1
1 CAPSULE ORAL DAILY
Qty: 90 CAPSULE | Refills: 3 | Status: SHIPPED | OUTPATIENT
Start: 2022-06-01 | End: 2023-02-23

## 2022-06-01 RX ORDER — NYSTATIN 100000 U/G
CREAM TOPICAL 2 TIMES DAILY
COMMUNITY
Start: 2021-12-08 | End: 2022-06-01

## 2022-06-01 RX ORDER — AMLODIPINE AND BENAZEPRIL HYDROCHLORIDE 10; 40 MG/1; MG/1
CAPSULE ORAL
COMMUNITY
Start: 2022-04-20 | End: 2022-06-01 | Stop reason: ALTCHOICE

## 2022-06-01 RX ORDER — MELOXICAM 7.5 MG/1
TABLET ORAL
COMMUNITY
Start: 2022-04-20 | End: 2022-06-01 | Stop reason: SDUPTHER

## 2022-06-01 RX ORDER — FLASH GLUCOSE SCANNING READER
1 EACH MISCELLANEOUS CONTINUOUS
Qty: 1 EACH | Refills: 2 | Status: SHIPPED | OUTPATIENT
Start: 2022-06-01 | End: 2022-06-29

## 2022-06-01 RX ORDER — DICLOFENAC SODIUM 10 MG/G
GEL TOPICAL 2 TIMES DAILY
COMMUNITY
Start: 2021-12-08 | End: 2022-06-01 | Stop reason: SDUPTHER

## 2022-06-01 RX ORDER — DICLOFENAC SODIUM 10 MG/G
GEL TOPICAL 4 TIMES DAILY
Qty: 300 G | Refills: 3 | Status: SHIPPED | OUTPATIENT
Start: 2022-06-01 | End: 2023-05-04 | Stop reason: SDUPTHER

## 2022-06-01 RX ORDER — ATORVASTATIN CALCIUM 40 MG/1
40 TABLET, FILM COATED ORAL NIGHTLY
Qty: 90 TABLET | Refills: 3 | Status: SHIPPED | OUTPATIENT
Start: 2022-06-01 | End: 2023-02-23

## 2022-06-01 RX ORDER — CANAGLIFLOZIN 100 MG/1
100 TABLET, FILM COATED ORAL DAILY
Qty: 90 TABLET | Refills: 3 | Status: SHIPPED | OUTPATIENT
Start: 2022-06-01 | End: 2023-03-16

## 2022-06-01 RX ORDER — MELOXICAM 7.5 MG/1
TABLET ORAL
Qty: 90 TABLET | Refills: 3 | Status: SHIPPED | OUTPATIENT
Start: 2022-06-01 | End: 2023-05-01

## 2022-06-01 RX ORDER — CALCIUM CITRATE/VITAMIN D3 200MG-6.25
TABLET ORAL
COMMUNITY
Start: 2022-04-27 | End: 2024-04-03 | Stop reason: SDDI

## 2022-06-01 NOTE — PATIENT INSTRUCTIONS
DASH Diet   About this topic   DASH stands for Dietary Approaches to Stop Hypertension. The DASH diet may help you lower blood pressure. It may also help keep you from getting high blood pressure. You will eat less fat and more fiber on the DASH diet.  This diet gives you more minerals that fight high blood pressure. Some nutrients in this diet are:  Potassium ? Acts to help you get rid of salt. This may help to lower blood pressure.  Calcium ? Makes blood vessels and muscles work the right way  Magnesium - Helps blood vessels relax  Fiber ? Helps you feel full. It also helps digestion.  What will the results be?   The DASH diet may help you:  Lower your blood pressure and cholesterol  Lower your risk for cancer, heart disease, heart attack, and stroke. It may also lower your risk for heart failure, kidney stones, and diabetes.  Lose weight or keep a healthy weight  What lifestyle changes are needed?   Add regular exercise to get the most help from this diet.  Try to lower stress. Find ways to relax.  Stop smoking. Avoid secondhand smoke.  Limit alcohol intake.  What changes to diet are needed?   Know about poor eating habits. Then, you can fix them as you work with the program.  This diet encourages fruits and vegetables, whole grains, lean meats, healthy fats, and low-fat or fat-free dairy products.  This diet is lower in saturated fats, trans-fats, cholesterol, added sugars, and sodium.  Who should use this diet?   This eating plan is good for the whole family. It is also good for people with high blood pressure and those at risk for high blood pressure.  What foods are good to eat?   Grains: Try to eat 6 to 8 servings of whole grain, high fiber foods each day. These are bread, cereals, brown rice, or pasta.  Fruits and vegetables: Eat 4 to 5 servings each day. Try to pick many kinds and colors. Fresh or frozen are best. Look for low sodium or salt-free if you choose canned.  Dairy: Try to eat 2 to 3 servings of  fat free and low fat milk products each day.  Lean meats, poultry, and seafood: Try to eat 6 servings or less of lean meats, poultry, and seafood each day. Try to choose more low fat or lean meats like chicken and turkey. Eat less red meat. Eat more fish instead.  Nuts, seeds, and legumes (dry beans and peas): Try to eat 4 to 5 servings each week. Try to pick nuts such as almonds and walnuts, sunflower seeds, peanut butter, soy beans, lentils, kidney beans, and split peas.  Fats and oils: Try to eat 2 to 3 servings of fats and oils each day. Eat good fats found in fish, nuts, and avocados. Try using olive oil or vegetable oils such as canola oil. Other good oils to try are corn, safflower, sunflower, or soybean oils. Use low-sodium and low-fat salad dressing and mayonnaise.  Condiments: Pepper, herbs, spices, vinegar, lemon or lime juices are great for seasoning. Be careful to choose low-sodium or salt-free products if you use broths, soups, or soy sauce.  Sweets: Try to eat less than 5 servings each week. Choose low-fat and trans fat-free desserts. These are things like fruit flavored gelatin, sorbet, jelly beans, juainto crackers, animal crackers, low-fat fig bars, and jersey snaps. Eat fruit to satisfy your desire for sweets.     What foods should be limited or avoided?   Grains: Salted breads, rolls, crackers, quick breads, self-rising flours, biscuit mixes, regular bread crumbs, instant hot cereals, commercially-prepared rice, pasta, stuffing mixes  Fruits and vegetables: Commercially-prepared potatoes and vegetable mixes, regular canned vegetables and juices, vegetables frozen with sauce or pickled vegetables, processed fruits with salt or sodium  Milk: Whole milk, malted milk, chocolate milk, buttermilk, cheese, ice cream  Meats and beans: Smoked, cured, salted, or canned fish; meats or poultry such as joseph, sausages, sardines; high-fat cuts of meat like beef, lamb, or pork; chicken with the skin on it  Fats:  Cut back on solid fats like butter, lard, and margarine. Eat less food with high saturated fat, cholesterol and total fat.  Condiments and snacks: Salted and canned peas, beans, and olives; salted snack foods; fried foods; soda or other sweetened drinks  Sweets: High-fat baked goods such as muffins, donuts, pastries, commercial baked goods, candy bars  If you choose to drink alcohol, limit the amount you drink. Women should have 1 drink or less per day and men should have 2 drinks or less per day.  Helpful tips   Avoid eating canned vegetables and processed foods. These have a lot of salt in them. Look for a low-salt or low-sodium choice.  Try baking or broiling instead of frying food.  Write down the foods you eat. This will help you track what you have eaten each week.  When you go to a grocery store, have a list or a meal plan. Do not shop when you are hungry to avoid cravings for foods.  Read food labels with care. They will show you how much is in a serving. The amount is given as a percentage of the total amount you need each day. Reading labels will help you make healthy food choices.       Avoid fast foods.  Talk to your doctor or dietitian to see if you need vitamin and mineral supplements to help you balance your diet.  Talk to a dietitian for help.  Where can I learn more?   [employer]y [employer]etics  https://www.eatright.org/health/wellness/heart-and-cardiovascular-health/dash-diet-reducing-hypertension-through-diet-and-lifestyle   [employer]Doctor.org  http://familydoctor.org/familydoctor/en/prevention-wellness/food-nutrition/weight-loss/the-dash-diet-healthy-eating-to-control-your-blood-pressure.html   Last Reviewed Date   2021-03-15  Consumer Information Use and Disclaimer   This information is not specific medical advice and does not replace information you receive from your health care provider. This is only a brief summary of general information. It does NOT include all information about  conditions, illnesses, injuries, tests, procedures, treatments, therapies, discharge instructions or life-style choices that may apply to you. You must talk with your health care provider for complete information about your health and treatment options. This information should not be used to decide whether or not to accept your health care providers advice, instructions or recommendations. Only your health care provider has the knowledge and training to provide advice that is right for you.  Copyright   Copyright © 2021 [employer] and its affiliates and/or licensors. All rights reserved.

## 2022-06-01 NOTE — PROGRESS NOTES
"[facility]  [address] [address]  Phone: [phone]   Subjective:      Patient ID: Paola Cm is a 61 y.o. female.    Chief Complaint: Follow-up (C/o bilateral knee pain, needs handicap parking tag )    Patient states that she is "trying to" adhering to  low sodium, a low fat and low carbohydrate eating habit. Patient states that she is noncompliant with her medications and treatment regimen. She did not get meds filled after her  2/28/2022 and has not taken medications consistently. Pt has joined a group that is motivated to healthy eating and exercise. She has been exercising 3 times a week but has issues with b/l OA in knees that limit her activity.      Problem List Items Addressed This Visit        Cardiac/Vascular    Mixed hyperlipidemia    Relevant Medications    atorvastatin (LIPITOR) 40 MG tablet    Other Relevant Orders    Lipid Panel    Hypertension    Overview     The patient presents with essential hypertension.  Pt was not on any medications for4 months.  The patient is experiencing no side effects.  Counseling was offered regarding low salt diets.  The patient has a reduced salt intake.  The patient denies chest pain, palpitations, shortness of breath, dyspnea on exertion, left or murmur neck pain, nausea, vomiting, diaphoresis, paroxysmal nocturnal dyspnea, and orthopnea.   Hypertension Medications             amlodipine-benazepril 10-40mg (LOTREL) 10-40 mg per capsule Take 1 capsule by mouth once daily.               Relevant Medications    amlodipine-benazepril 10-20mg (LOTREL) 10-20 mg per capsule    Other Relevant Orders    Lipid Panel       Endocrine    Uncontrolled type 2 diabetes mellitus - Primary    Overview     Was supposed to be on invokamet 150/1000 BID  Pt cannot tolerate metformin due to diarrhea so she stopped taking the medication           Relevant Medications    flash glucose sensor (FREESTYLE BIRDIE 14 DAY " SENSOR) Kit    flash glucose scanning reader (TykoonYLE BIRDIE 14 DAY READER) Misc    Other Relevant Orders    Ambulatory referral/consult to Diabetes Education    CBC Auto Differential    Comprehensive Metabolic Panel    TSH    Microalbumin/Creatinine Ratio, Urine    Hemoglobin A1C       Palliative Care    General patient noncompliance      Other Visit Diagnoses     Primary osteoarthritis of both knees        Relevant Medications    diclofenac sodium (VOLTAREN) 1 % Gel    meloxicam (MOBIC) 7.5 MG tablet          The patient's Health Maintenance was reviewed and the following appears to be due:   Health Maintenance Due   Topic Date Due    Hepatitis C Screening  Never done    Cervical Cancer Screening  Never done    COVID-19 Vaccine (1) Never done    HIV Screening  Never done    TETANUS VACCINE  Never done    Colorectal Cancer Screening  Never done    Shingles Vaccine (1 of 2) Never done       (PHQ-2 data if performed)  Depression Patient Health Questionnaire 6/1/2022   Over the last two weeks how often have you been bothered by little interest or pleasure in doing things 0   Over the last two weeks how often have you been bothered by feeling down, depressed or hopeless 0   PHQ-2 Total Score 0     (PHQ-9 data if performed)  No flowsheet data found.  (MARIE data if performed)  No flowsheet data found.     Past Medical History:  History reviewed. No pertinent past medical history.  History reviewed. No pertinent surgical history.  Review of patient's allergies indicates:  No Known Allergies  Current Outpatient Medications on File Prior to Visit   Medication Sig Dispense Refill    [DISCONTINUED] atorvastatin calcium (LIPITOR ORAL) Take by mouth.      TRUE METRIX GLUCOSE TEST STRIP Strp USE TO CHECK BLOOD SUGARS DAILY      [DISCONTINUED] amLODIPine-benazepriL (LOTREL) 10-40 mg per capsule TAKE 1 CAPSULE BY MOUTH DAILY. HOLD FOR BP<100/50      [DISCONTINUED] diclofenac sodium (VOLTAREN) 1 % Gel 2 (two) times  daily. apply to affected area      [DISCONTINUED] meloxicam (MOBIC) 7.5 MG tablet TAKE 1 TABLET BY MOUTH DAILY AS NEEDED FOR PAIN      [DISCONTINUED] nystatin (MYCOSTATIN) cream 2 (two) times daily. apply to affected area         Social History     Socioeconomic History    Marital status:    Tobacco Use    Smoking status: Never Smoker    Smokeless tobacco: Never Used   Substance and Sexual Activity    Alcohol use: Never    Drug use: Never     History reviewed. No pertinent family history.    Review of Systems   Constitutional: Negative for chills, fatigue and fever.   HENT: Negative for congestion, hearing loss, rhinorrhea, sore throat and voice change.    Eyes: Negative for visual disturbance.   Respiratory: Negative for cough, shortness of breath and wheezing.    Cardiovascular: Negative for chest pain, palpitations and leg swelling.   Gastrointestinal: Negative for abdominal pain, blood in stool, constipation, diarrhea, nausea and vomiting.   Endocrine: Negative for cold intolerance, heat intolerance, polydipsia, polyphagia and polyuria.   Genitourinary: Negative for decreased urine volume, difficulty urinating, flank pain, frequency, hematuria and urgency.   Musculoskeletal: Positive for joint swelling (knees). Negative for arthralgias, gait problem and myalgias.   Skin: Negative for rash.   Neurological: Negative for dizziness, seizures, speech difficulty, weakness, numbness and headaches.   Hematological: Negative for adenopathy.   Psychiatric/Behavioral: Negative.  Negative for decreased concentration, dysphoric mood, hallucinations and suicidal ideas. The patient is not nervous/anxious.    All other systems reviewed and are negative.      Objective:   BP (!) 144/82 (BP Location: Left arm, Patient Position: Sitting, BP Method: Large (Automatic))   Pulse 91   Temp 97.9 °F (36.6 °C) (Oral)   Resp 18   Wt 73.2 kg (161 lb 6 oz)   SpO2 100%   BMI 29.70 kg/m²   Physical Exam  Vitals and nursing  note reviewed.   Constitutional:       General: She is not in acute distress.     Appearance: Normal appearance. She is normal weight. She is not ill-appearing.   HENT:      Head: Normocephalic and atraumatic.      Nose: Nose normal.      Mouth/Throat:      Mouth: Mucous membranes are moist.   Eyes:      Extraocular Movements: Extraocular movements intact.      Conjunctiva/sclera: Conjunctivae normal.      Pupils: Pupils are equal, round, and reactive to light.   Cardiovascular:      Rate and Rhythm: Normal rate and regular rhythm.      Pulses: Normal pulses.      Heart sounds: Normal heart sounds. No murmur heard.    No friction rub. No gallop.   Pulmonary:      Effort: Pulmonary effort is normal. No respiratory distress.      Breath sounds: Normal breath sounds. No wheezing, rhonchi or rales.   Abdominal:      General: Abdomen is flat. Bowel sounds are normal.      Palpations: Abdomen is soft. There is no mass.      Tenderness: There is no abdominal tenderness. There is no guarding or rebound.      Hernia: No hernia is present.   Musculoskeletal:         General: Swelling present. Tenderness: b/l knees. Normal range of motion.      Cervical back: Normal range of motion and neck supple.      Right lower leg: No edema.      Left lower leg: No edema.   Skin:     General: Skin is warm and dry.      Capillary Refill: Capillary refill takes less than 2 seconds.      Findings: No lesion or rash.   Neurological:      General: No focal deficit present.      Mental Status: She is alert and oriented to person, place, and time. Mental status is at baseline.      Cranial Nerves: No cranial nerve deficit.      Sensory: No sensory deficit.      Gait: Gait normal.   Psychiatric:         Mood and Affect: Mood normal.         Behavior: Behavior normal.         Thought Content: Thought content normal.         Judgment: Judgment normal.          No visits with results within 1 Month(s) from this visit.   Latest known visit with  results is:   Historical on 04/20/2022   Component Date Value Ref Range Status    Cholesterol Total 04/20/2022 269  <=200 Final    HDL Cholesterol 04/20/2022 34  35 - 60 Final    Triglyceride 04/20/2022 177  37 - 140 Final    Very Low Density Lipoprotein 04/20/2022 35   Final    Cholesterol/HDL Ratio 04/20/2022 8  0 - 5 Final    LDL Cholesterol 04/20/2022 200.00  50.00 - 140.00 Final      Assessment:     1. Uncontrolled type 2 diabetes mellitus with hyperglycemia    2. Primary hypertension    3. Mixed hyperlipidemia    4. Primary osteoarthritis of both knees    5. General patient noncompliance      Plan:   1. Uncontrolled DM2: Last HgA1c was 13. Discontinued invokamet- pt could not tolerate diarrhea from metformin. Started invokana 100 mg daily for better glucose control. Pt has not been checking CBS at home routinely. Last CBS today was 198 pr pt. . Additionally, I am having her start on FREESTYLE BIRDIE 14 DAY SENSOR, FREESTYLE BIRDIE 14 DAY READER and I refilled   her TRUE METRIX GLUCOSE TEST STRIP.Referral to DM education submitted for teaching on Freestyle birdie 14 day meter for better control of DM2 and for diabetic education.  2. HTN: BP uncontrolled but patient not on any BP meds. Discontinued  amLODIPine-benazepril 10/40 daily to avoid hypotension. Started amlodipine -[id number]/20 daily  3. Mixed HLD: Continued atorvastatin 40 qhs  4. OA b/ knee:  Start meloxicam and diclofenac topical for knee pain. Pt has had meloxicam since last visit but did not start either.  5. Noncompliance- Pt has been noncompliant with medications and medical management. Pt has not been taking  any ofher medications for the past  4 months. She states she should be able to control  her DM2 solely with diet and exercise.Discussed with the patient that she has comorbid conditions with  DM,. HTN and  HLD, that can lead to major morbidity and mortality if medication regimen is not followed. Pt states she will take medications  above. Medication side effects discussed with the patient. All patient's questions were answered to the patient's satisfaction. Patient expressed verbal understanding and agreement with the medical treatment above.       Problem List Items Addressed This Visit        Cardiac/Vascular    Mixed hyperlipidemia    Relevant Medications    atorvastatin (LIPITOR) 40 MG tablet    Other Relevant Orders    Lipid Panel    Hypertension    Relevant Medications    amlodipine-benazepril 10-20mg (LOTREL) 10-20 mg per capsule    Other Relevant Orders    Lipid Panel       Endocrine    Uncontrolled type 2 diabetes mellitus - Primary    Relevant Medications    flash glucose sensor (FREESTYLE BIRDIE 14 DAY SENSOR) Kit    flash glucose scanning reader (FREESTYLE BIRDIE 14 DAY READER) Misc    Other Relevant Orders    Ambulatory referral/consult to Diabetes Education    CBC Auto Differential    Comprehensive Metabolic Panel    TSH    Microalbumin/Creatinine Ratio, Urine    Hemoglobin A1C       Palliative Care    General patient noncompliance      Other Visit Diagnoses     Primary osteoarthritis of both knees        Relevant Medications    diclofenac sodium (VOLTAREN) 1 % Gel    meloxicam (MOBIC) 7.5 MG tablet          Medication List with Changes/Refills   New Medications    AMLODIPINE-BENAZEPRIL 10-20MG (LOTREL) 10-20 MG PER CAPSULE    Take 1 capsule by mouth once daily.    CANAGLIFLOZIN (INVOKANA) 100 MG TAB TABLET    Take 1 tablet (100 mg total) by mouth once daily.    FLASH GLUCOSE SCANNING READER (FREESTYLE BIRDIE 14 DAY READER) MISC    1 each by Misc.(Non-Drug; Combo Route) route continuous.    FLASH GLUCOSE SENSOR (FREESTYLE BIRDIE 14 DAY SENSOR) KIT    1 each by Misc.(Non-Drug; Combo Route) route continuous.   Current Medications    TRUE METRIX GLUCOSE TEST STRIP STRP    USE TO CHECK BLOOD SUGARS DAILY   Changed and/or Refilled Medications    Modified Medication Previous Medication    ATORVASTATIN (LIPITOR) 40 MG TABLET atorvastatin  calcium (LIPITOR ORAL)       Take 1 tablet (40 mg total) by mouth every evening.    Take by mouth.    DICLOFENAC SODIUM (VOLTAREN) 1 % GEL diclofenac sodium (VOLTAREN) 1 % Gel       Apply topically 4 (four) times daily. apply to affected area    2 (two) times daily. apply to affected area    MELOXICAM (MOBIC) 7.5 MG TABLET meloxicam (MOBIC) 7.5 MG tablet       TAKE 1 TABLET BY MOUTH DAILY AS NEEDED FOR PAIN WITH FOOD AND PLENTY OF WATER    TAKE 1 TABLET BY MOUTH DAILY AS NEEDED FOR PAIN   Discontinued Medications    AMLODIPINE-BENAZEPRIL (LOTREL) 10-40 MG PER CAPSULE    TAKE 1 CAPSULE BY MOUTH DAILY. HOLD FOR BP<100/50    NYSTATIN (MYCOSTATIN) CREAM    2 (two) times daily. apply to affected area            Orders Placed This Encounter   Procedures    CBC Auto Differential     Standing Status:   Future     Standing Expiration Date:   7/31/2023    Comprehensive Metabolic Panel     Standing Status:   Future     Standing Expiration Date:   7/31/2023    TSH     Standing Status:   Future     Standing Expiration Date:   7/31/2023    Microalbumin/Creatinine Ratio, Urine     Order Specific Question:   Specimen Source     Answer:   Urine    Hemoglobin A1C     Standing Status:   Future     Standing Expiration Date:   7/31/2023    Lipid Panel     Standing Status:   Future     Standing Expiration Date:   7/31/2023    Ambulatory referral/consult to Diabetes Education     Standing Status:   Future     Standing Expiration Date:   6/1/2023     Referral Priority:   Routine     Referral Type:   Consultation     Referral Reason:   Specialty Services Required     Requested Specialty:   Diabetes     Number of Visits Requested:   1     Expiration Date:   6/1/2023       Previous medical history/lab work reviewed and considered during medical management decisions.   Medication list reviewed and medication reconciliation performed.  Patient was provided  and care about his/her current diagnosis (es) and medications including  risk/benefit and side effects/adverse events, over the counter medication uses/doses, home self-care and contact precautions,  and red flags and indications for when to seek immediate medical attention.   Patient was advised to continue compliance with current medication list and medical recommendations.  Recommended/ Advised continued compliance with recommended eating habits/ diets for medical conditions and exercise 150 minutes/ week (if possible) for medical condition (s).  All of the patient's questions were answered to patient's satisfaction.   The patient was receptive, expressed verbal understanding and agreement the above plan.  Portions of this encounter dictated using [employer] Fluency Direct voice recognition software. Please excuse any typographical errors that might have transpired.     I spent 60 minutes in preparing to see the patient, performing a medical interview/ history to collect relevant patient information pertaining to disease processes involving reviewing documentation from other specialists with: Management of the patient, performing a physical exam, counseling and educating the patient in detail about her disease process, discussed medications with potential side effects, ordering medications and tests for disease processes, independently interpreting results and discussing results with the patient and documentation of patient care regarding diagnosis, medications, side effects and patient education.  All the patient's questions were answered to the patient's satisfaction.        Follow up in about 5 weeks (around 7/6/2022) for knee, DM2, rolator.  Darian Burger MD

## 2022-06-03 ENCOUNTER — TELEPHONE (OUTPATIENT)
Dept: FAMILY MEDICINE | Facility: CLINIC | Age: 62
End: 2022-06-03

## 2022-06-03 ENCOUNTER — CLINICAL SUPPORT (OUTPATIENT)
Dept: DIABETES | Facility: CLINIC | Age: 62
End: 2022-06-03

## 2022-06-03 VITALS — BODY MASS INDEX: 29.2 KG/M2 | WEIGHT: 158.69 LBS | HEIGHT: 62 IN

## 2022-06-03 DIAGNOSIS — E11.65 UNCONTROLLED TYPE 2 DIABETES MELLITUS WITH HYPERGLYCEMIA: ICD-10-CM

## 2022-06-03 PROCEDURE — 99212 OFFICE O/P EST SF 10 MIN: CPT | Mod: PBBFAC

## 2022-06-03 PROCEDURE — G0108 DIAB MANAGE TRN  PER INDIV: HCPCS | Mod: PBBFAC

## 2022-06-03 NOTE — TELEPHONE ENCOUNTER
Patient would like [employer] to go to [facility]. During her visit on Wednesday the scripts were accidentally  printed instead of E-Scribed.

## 2022-06-13 ENCOUNTER — TELEPHONE (OUTPATIENT)
Dept: DIABETES | Facility: CLINIC | Age: 62
End: 2022-06-13

## 2022-06-13 NOTE — TELEPHONE ENCOUNTER
Pt called to discuss issues with Freestyle Lily sensor failure. Instructed pt to contact [employer]. Instructed to change site of application. Pt would like to see diabetes education to apply new sensor. Appointment scheduled.

## 2022-06-28 ENCOUNTER — TELEPHONE (OUTPATIENT)
Dept: DIABETES | Facility: CLINIC | Age: 62
End: 2022-06-28

## 2022-06-28 NOTE — TELEPHONE ENCOUNTER
Pt called to say that her blood sugar is 86.  Pt unsure if she should be concerned since this is lower than usual.    Explained to pt that this is a normal blood sugar & congratulated her.  Pt reports that she has not eaten yet today.  Encouraged pt to eat so that blood sugar does not drop.      Pt also asking how long after eating she should wait to check blood sugar.  Encouraged pt to wait 1-2 hours after eating to check.    Pt scheduled for follow-up with diabetes education 7/7/22.  Reminded pt of her appt & encouraged her to call with any further questions should they arise.

## 2022-07-07 ENCOUNTER — OFFICE VISIT (OUTPATIENT)
Dept: FAMILY MEDICINE | Facility: CLINIC | Age: 62
End: 2022-07-07

## 2022-07-07 VITALS — SYSTOLIC BLOOD PRESSURE: 134 MMHG | DIASTOLIC BLOOD PRESSURE: 80 MMHG

## 2022-07-07 DIAGNOSIS — E11.65 UNCONTROLLED TYPE 2 DIABETES MELLITUS WITH HYPERGLYCEMIA: Primary | ICD-10-CM

## 2022-07-07 DIAGNOSIS — E78.2 MIXED HYPERLIPIDEMIA: ICD-10-CM

## 2022-07-07 DIAGNOSIS — Z91.199 GENERAL PATIENT NONCOMPLIANCE: ICD-10-CM

## 2022-07-07 PROCEDURE — 99214 PR OFFICE/OUTPT VISIT, EST, LEVL IV, 30-39 MIN: ICD-10-PCS | Mod: 95,,, | Performed by: FAMILY MEDICINE

## 2022-07-07 PROCEDURE — 99214 OFFICE O/P EST MOD 30 MIN: CPT | Mod: 95,,, | Performed by: FAMILY MEDICINE

## 2022-07-07 RX ORDER — PREDNISOLONE ACETATE 10 MG/ML
1 SUSPENSION/ DROPS OPHTHALMIC 4 TIMES DAILY
COMMUNITY
Start: 2022-06-29 | End: 2022-07-09

## 2022-07-08 ENCOUNTER — LAB VISIT (OUTPATIENT)
Dept: LAB | Facility: HOSPITAL | Age: 62
End: 2022-07-08
Attending: FAMILY MEDICINE

## 2022-07-08 DIAGNOSIS — E11.65 UNCONTROLLED TYPE 2 DIABETES MELLITUS WITH HYPERGLYCEMIA: ICD-10-CM

## 2022-07-08 DIAGNOSIS — E78.2 MIXED HYPERLIPIDEMIA: ICD-10-CM

## 2022-07-08 LAB
ALBUMIN SERPL-MCNC: 3.7 GM/DL (ref 3.4–4.8)
ALBUMIN/GLOB SERPL: 0.8 RATIO (ref 1.1–2)
ALP SERPL-CCNC: 161 UNIT/L (ref 40–150)
ALT SERPL-CCNC: 17 UNIT/L (ref 0–55)
AST SERPL-CCNC: 16 UNIT/L (ref 5–34)
BASOPHILS # BLD AUTO: 0.08 X10(3)/MCL (ref 0–0.2)
BASOPHILS NFR BLD AUTO: 0.8 %
BILIRUBIN DIRECT+TOT PNL SERPL-MCNC: 0.4 MG/DL
BUN SERPL-MCNC: 19.1 MG/DL (ref 9.8–20.1)
CALCIUM SERPL-MCNC: 10.3 MG/DL (ref 8.4–10.2)
CHLORIDE SERPL-SCNC: 107 MMOL/L (ref 98–107)
CHOLEST SERPL-MCNC: 288 MG/DL
CHOLEST/HDLC SERPL: 7 {RATIO} (ref 0–5)
CO2 SERPL-SCNC: 25 MMOL/L (ref 23–31)
CREAT SERPL-MCNC: 0.95 MG/DL (ref 0.55–1.02)
CREAT UR-MCNC: 63.9 MG/DL (ref 47–110)
EOSINOPHIL # BLD AUTO: 0.15 X10(3)/MCL (ref 0–0.9)
EOSINOPHIL NFR BLD AUTO: 1.5 %
ERYTHROCYTE [DISTWIDTH] IN BLOOD BY AUTOMATED COUNT: 12.6 % (ref 11.5–17)
EST. AVERAGE GLUCOSE BLD GHB EST-MCNC: 266.1 MG/DL
GLOBULIN SER-MCNC: 4.5 GM/DL (ref 2.4–3.5)
GLUCOSE SERPL-MCNC: 231 MG/DL (ref 82–115)
HBA1C MFR BLD: 10.9 %
HCT VFR BLD AUTO: 42.8 % (ref 37–47)
HDLC SERPL-MCNC: 39 MG/DL (ref 35–60)
HGB BLD-MCNC: 13.7 GM/DL (ref 12–16)
IMM GRANULOCYTES # BLD AUTO: 0.03 X10(3)/MCL (ref 0–0.04)
IMM GRANULOCYTES NFR BLD AUTO: 0.3 %
LDLC SERPL CALC-MCNC: 213 MG/DL (ref 50–140)
LYMPHOCYTES # BLD AUTO: 2.91 X10(3)/MCL (ref 0.6–4.6)
LYMPHOCYTES NFR BLD AUTO: 29.2 %
MCH RBC QN AUTO: 26.9 PG (ref 27–31)
MCHC RBC AUTO-ENTMCNC: 32 MG/DL (ref 33–36)
MCV RBC AUTO: 84.1 FL (ref 80–94)
MICROALBUMIN UR-MCNC: 43.4 UG/ML
MICROALBUMIN/CREAT RATIO PNL UR: 67.9 MG/GM CR (ref 0–30)
MONOCYTES # BLD AUTO: 0.56 X10(3)/MCL (ref 0.1–1.3)
MONOCYTES NFR BLD AUTO: 5.6 %
NEUTROPHILS # BLD AUTO: 6.2 X10(3)/MCL (ref 2.1–9.2)
NEUTROPHILS NFR BLD AUTO: 62.6 %
NRBC BLD AUTO-RTO: 0 %
PLATELET # BLD AUTO: 299 X10(3)/MCL (ref 130–400)
PMV BLD AUTO: 12.3 FL (ref 7.4–10.4)
POTASSIUM SERPL-SCNC: 4.4 MMOL/L (ref 3.5–5.1)
PROT SERPL-MCNC: 8.2 GM/DL (ref 5.8–7.6)
RBC # BLD AUTO: 5.09 X10(6)/MCL (ref 4.2–5.4)
SODIUM SERPL-SCNC: 143 MMOL/L (ref 136–145)
TRIGL SERPL-MCNC: 178 MG/DL (ref 37–140)
TSH SERPL-ACNC: 0.39 UIU/ML (ref 0.35–4.94)
VLDLC SERPL CALC-MCNC: 36 MG/DL
WBC # SPEC AUTO: 10 X10(3)/MCL (ref 4.5–11.5)

## 2022-07-08 PROCEDURE — 36415 COLL VENOUS BLD VENIPUNCTURE: CPT

## 2022-07-08 PROCEDURE — 80061 LIPID PANEL: CPT

## 2022-07-08 PROCEDURE — 83036 HEMOGLOBIN GLYCOSYLATED A1C: CPT

## 2022-07-08 PROCEDURE — 85025 COMPLETE CBC W/AUTO DIFF WBC: CPT

## 2022-07-08 PROCEDURE — 80053 COMPREHEN METABOLIC PANEL: CPT

## 2022-07-08 PROCEDURE — 84443 ASSAY THYROID STIM HORMONE: CPT

## 2022-07-08 NOTE — PROGRESS NOTES
[facility]  [address] [address]  Phone: [phone]   Primary Care Telemedicine Note   The patient location is:  Patient Home    The chief complaint leading to consultation is: DM2, HTN, HLD  Total time spent with patient: 20 minutes ( -)  Visit type: Virtual visit with synchronous audio only and video   Each patient to whom he or she provides medical services by telemedicine is:  (1) informed of the relationship between the physician and patient and the respective role of any other health care provider with respect to management of the patient; and (2) notified that he or she may decline to receive medical services by telemedicine and may withdraw from such care at any time.       This is a telemedicine note. I have reviewed the patient's name. I verbally reviewed the past medical history, active medication list and allergy list with the patient and verified with a picture ID if applicable.I have verified that this patient is in the state of [place]. After obtaining verbal consent/patient identification using name and , patient was treated using telemedicine (audio only ) communication, according to [facility] protocols. I, Darian Burger MD, conducted the visit from distant site, [facility]. The patient participated in the visit  at a non-[facility] location selection by the patient ( or patient's representative), patients's home. I am licensed in the state where the patient stated they are located. The patient (or patient's representative) stated that they understood and accepted the privacy and security risks to their information at their location. This visit occurred during the Coronavirus (COVID-19) Public Health Emergency.     The patient was offered Telemedicine (audio/visual) communication but he/she declined, thus audio only telemedicine communication visit was conducted because  of lack of access to Internet.         CC:As Above   HPI:   Problem List Items Addressed This Visit        Cardiac/Vascular    Mixed hyperlipidemia    Overview     The patient presents with hyperlipidemia.  The patient reports tolerating the medication well and is in excellent compliance.  There have been no medication side effects.  The patient denies chest pain, neuropathy, and myalgias.  The patient has reduced fat intake and has been exercising.  Current treatment has included the medications listed in the med card.    Lab Results   Component Value Date    CHOL 269 04/20/2022       Lab Results   Component Value Date    HDL 34 04/20/2022     No results found for: LDLCALC    Lab Results   Component Value Date    TRIG 177 04/20/2022     No results found for: CHOLHDL  Lab Results   Component Value Date    ALT 15 02/28/2022    AST 14 02/28/2022    ALKPHOS 167 02/28/2022    BILITOT 0.4 02/28/2022            Relevant Orders    CBC Auto Differential    Comprehensive Metabolic Panel    Hemoglobin A1C       Endocrine    Uncontrolled type 2 diabetes mellitus - Primary    Overview         Pt is currently on Invokana 100 mg daily, With - 239; one time 60's;.  Pt cannot tolerate metformin due to diarrhea.  Pt did not get labs done prior to visit.    The patient presents with diabetes.  The patient denies polyuria, polydipsia, polyphagia, hypoglycemia and paresthesias.  The patient's glucose control has been good.  Home glucose averages are routinely checked.  The patient is without retinopathy currently.  The patient has no history of neuropathy.  The patient currently complains of no podiatric problems.  The patient has excellent compliance.  Hemoglobin A1c   Date Value Ref Range Status   02/28/2022 12.4 <=7.0    11/05/2019 9.5 (H) 4.2 - 6.3 % Final     No results found for: JENNIFER PARKER  Diabetes Management Status    Statin: Taking  ACE/ARB: Taking    Screening or Prevention Patient's value Goal  Complete/Controlled?   HgA1C Testing and Control   Lab Results   Component Value Date    HGBA1C 12.4 02/28/2022      Annually/Less than 8% No   Lipid profile : 04/20/2022 Annually Yes   LDL control No results found for: LDLCALC Annually/Less than 100 mg/dl  No   Nephropathy screening No results found for: LABMICR  No results found for: PROTEINUA  No results found for: UTPCR   Annually No   Blood pressure BP Readings from Last 1 Encounters:   07/07/22 134/80    Less than 140/90 Yes   Dilated retinal exam Most Recent Eye Exam Date: Not Found Annually No   Foot exam   Most Recent Foot Exam Date: Not Found Annually No                Relevant Orders    CBC Auto Differential    Comprehensive Metabolic Panel    TSH    Microalbumin/Creatinine Ratio, Urine    Lipid Panel       Palliative Care    General patient noncompliance    Overview     Pt has not been compliant with atorvastatin  Pt has had issues with compliance with ADA eating habit but doing much better with following ADA diet              Pt strongly advised to be COMPLIANT with medications and medical regimen because she is at risk for CVD, CAD and other co- morbid conditions including mortality with her DM2 and HLD not controlled. Patient expressed verbal understanding and agreement with the medical treatment above.             The patient's Health Maintenance was reviewed and the following appears to be due at this time:   Health Maintenance Due   Topic Date Due    Hepatitis C Screening  Never done    COVID-19 Vaccine (1) Never done    HIV Screening  Never done    TETANUS VACCINE  Never done    Colorectal Cancer Screening  Never done    Shingles Vaccine (1 of 2) Never done          Outpatient Medications Prior to Visit   Medication Sig Dispense Refill    prednisoLONE acetate (PRED FORTE) 1 % DrpS Apply 1 drop to eye 4 (four) times daily.      amlodipine-benazepril 10-20mg (LOTREL) 10-20 mg per capsule Take 1 capsule by mouth once daily. 90 capsule 3     atorvastatin (LIPITOR) 40 MG tablet Take 1 tablet (40 mg total) by mouth every evening. 90 tablet 3    canagliflozin (INVOKANA) 100 mg Tab tablet Take 1 tablet (100 mg total) by mouth once daily. 90 tablet 3    diclofenac sodium (VOLTAREN) 1 % Gel Apply topically 4 (four) times daily. apply to affected area 300 g 3    meloxicam (MOBIC) 7.5 MG tablet TAKE 1 TABLET BY MOUTH DAILY AS NEEDED FOR PAIN WITH FOOD AND PLENTY OF WATER 90 tablet 3    TRUE METRIX GLUCOSE TEST STRIP Strp USE TO CHECK BLOOD SUGARS DAILY       No facility-administered medications prior to visit.         PMH:  Past Medical History:   Diagnosis Date    Hyperlipidemia     Hypertension     Proliferative diabetic retinopathy of left eye     Uncontrolled type 2 diabetes mellitus with hyperglycemia      Past Surgical History:   Procedure Laterality Date     SECTION      EYE SURGERY      HYSTERECTOMY       Review of patient's allergies indicates:   Allergen Reactions    Latex Itching     Current Outpatient Medications on File Prior to Visit   Medication Sig Dispense Refill    prednisoLONE acetate (PRED FORTE) 1 % DrpS Apply 1 drop to eye 4 (four) times daily.      amlodipine-benazepril 10-20mg (LOTREL) 10-20 mg per capsule Take 1 capsule by mouth once daily. 90 capsule 3    atorvastatin (LIPITOR) 40 MG tablet Take 1 tablet (40 mg total) by mouth every evening. 90 tablet 3    canagliflozin (INVOKANA) 100 mg Tab tablet Take 1 tablet (100 mg total) by mouth once daily. 90 tablet 3    diclofenac sodium (VOLTAREN) 1 % Gel Apply topically 4 (four) times daily. apply to affected area 300 g 3    meloxicam (MOBIC) 7.5 MG tablet TAKE 1 TABLET BY MOUTH DAILY AS NEEDED FOR PAIN WITH FOOD AND PLENTY OF WATER 90 tablet 3    TRUE METRIX GLUCOSE TEST STRIP Strp USE TO CHECK BLOOD SUGARS DAILY       No current facility-administered medications on file prior to visit.     Social History     Socioeconomic History    Marital status:     Tobacco Use    Smoking status: Never Smoker    Smokeless tobacco: Never Used   Substance and Sexual Activity    Alcohol use: Never    Drug use: Never     History reviewed. No pertinent family history.    ROS   Patient denies chest pain, shortness of breath, dyspnea of exertion, palpitations, peripheral edema, abdominal pain, nausea,vomiting, diarrhea, constipation,fatigue,fever, chills, dysuria, hematuria, melena,or hematochezia. Rest of 12 point ROS is negative.    Physical Exam    /80  if verbally reported and entered.  No vital signs and no physical examination because of telemedicine (audio only) visit during COVID-29 pandemic.     DIAGNOSIS  Encounter Diagnoses   Name Primary?    Uncontrolled type 2 diabetes mellitus with hyperglycemia Yes    Mixed hyperlipidemia     General patient noncompliance           PLAN:     I am having Paola CARRIsela Cm maintain her TRUE METRIX GLUCOSE TEST STRIP, atorvastatin, diclofenac sodium, meloxicam, amlodipine-benazepril 10-20mg, INVOKANA, and prednisoLONE acetate.  Problem List Items Addressed This Visit        Cardiac/Vascular    Mixed hyperlipidemia    Relevant Orders    CBC Auto Differential    Comprehensive Metabolic Panel    Hemoglobin A1C       Endocrine    Uncontrolled type 2 diabetes mellitus - Primary    Relevant Orders    CBC Auto Differential    Comprehensive Metabolic Panel    TSH    Microalbumin/Creatinine Ratio, Urine    Lipid Panel       Palliative Care    General patient noncompliance             Medication List with Changes/Refills   Current Medications    AMLODIPINE-BENAZEPRIL 10-20MG (LOTREL) 10-20 MG PER CAPSULE    Take 1 capsule by mouth once daily.    ATORVASTATIN (LIPITOR) 40 MG TABLET    Take 1 tablet (40 mg total) by mouth every evening.    CANAGLIFLOZIN (INVOKANA) 100 MG TAB TABLET    Take 1 tablet (100 mg total) by mouth once daily.    DICLOFENAC SODIUM (VOLTAREN) 1 % GEL    Apply topically 4 (four) times daily. apply to affected area     MELOXICAM (MOBIC) 7.5 MG TABLET    TAKE 1 TABLET BY MOUTH DAILY AS NEEDED FOR PAIN WITH FOOD AND PLENTY OF WATER    PREDNISOLONE ACETATE (PRED FORTE) 1 % DRPS    Apply 1 drop to eye 4 (four) times daily.    TRUE METRIX GLUCOSE TEST STRIP STRP    USE TO CHECK BLOOD SUGARS DAILY             Orders Placed This Encounter   Procedures    CBC Auto Differential     Standing Status:   Future     Standing Expiration Date:   9/5/2023    Comprehensive Metabolic Panel     Standing Status:   Future     Standing Expiration Date:   9/5/2023    TSH     Standing Status:   Future     Standing Expiration Date:   9/5/2023    Microalbumin/Creatinine Ratio, Urine     Order Specific Question:   Specimen Source     Answer:   Urine    Hemoglobin A1C     Standing Status:   Future     Standing Expiration Date:   9/5/2023    Lipid Panel     Standing Status:   Future     Standing Expiration Date:   9/5/2023       Previous medical history/lab work/radiology reviewed and considered during medical management decisions.   Medication list reviewed and medication reconciliation performed.  Patient was provided  and care about his/her current diagnosis (es) and medications including risk/benefit and side effects/adverse events, over the counter medication uses/doses, home self-care and contact precautions,  and red flags and indications for when to seek immediate medical attention.   Patient was advised to continue compliance with current medication list and medical recommendations.  Recommended/ Advised continued compliance with recommended eating habits/ diets for medical conditions and exercise 150 minutes/ week (if possible) for medical condition (s).  Educational handouts and instructions on selected disease management in AVS (After Visit Summary).  All of the patient's questions were answered to patient's satisfaction.   The patient was receptive, expressed verbal understanding and agreement the above plan.    This note was created  with the assistance of  M*Modal Fluency Direct voice recognition or  phone dictation. Please excuse any typographical errors that might have transpired. There may be transcription errors as a result of using this technology, however minimal effort has been made to assure accuracy of transcription, but any obvious errors or omissions should be clarified with the author of the document    Reviwed labs from 7/2022: HgA1c 10.2 but FLP uncontrolled and worsening.  Follow up with  administrative management referral to Internal Medicine or Family Medicine (as of 7/8/2022)about 6 weeks (around 8/18/2022) for DM2 and HLD.  Darian Burger MD

## 2022-07-15 RX ORDER — PREDNISOLONE ACETATE 10 MG/ML
1 SUSPENSION/ DROPS OPHTHALMIC 4 TIMES DAILY
COMMUNITY
End: 2022-07-15 | Stop reason: SDUPTHER

## 2022-07-15 RX ORDER — PREDNISOLONE ACETATE 10 MG/ML
1 SUSPENSION/ DROPS OPHTHALMIC 4 TIMES DAILY
Qty: 10 ML | Refills: 0 | Status: SHIPPED | OUTPATIENT
Start: 2022-07-15 | End: 2023-10-10

## 2022-07-15 NOTE — TELEPHONE ENCOUNTER
Patient called stating that she was initially seen at our clinic and that we sent her to [facility]. BR sent a refill of her pred-forte to the [facility] pharmacy, however, because that MD is not in the [facility] system, the patient has to pay full price for the medication instead of the discounted price. Pt is requesting our MD send the prescription so she can buy it at the discounted price.

## 2023-02-23 ENCOUNTER — OFFICE VISIT (OUTPATIENT)
Dept: FAMILY MEDICINE | Facility: CLINIC | Age: 63
End: 2023-02-23

## 2023-02-23 VITALS
SYSTOLIC BLOOD PRESSURE: 177 MMHG | OXYGEN SATURATION: 100 % | HEIGHT: 61 IN | HEART RATE: 82 BPM | BODY MASS INDEX: 30.78 KG/M2 | WEIGHT: 163 LBS | TEMPERATURE: 98 F | RESPIRATION RATE: 18 BRPM | DIASTOLIC BLOOD PRESSURE: 99 MMHG

## 2023-02-23 DIAGNOSIS — Z12.11 ENCOUNTER FOR COLORECTAL CANCER SCREENING: ICD-10-CM

## 2023-02-23 DIAGNOSIS — Z91.199 GENERAL PATIENT NONCOMPLIANCE: ICD-10-CM

## 2023-02-23 DIAGNOSIS — E11.65 TYPE 2 DIABETES MELLITUS WITH HYPERGLYCEMIA, WITHOUT LONG-TERM CURRENT USE OF INSULIN: Primary | ICD-10-CM

## 2023-02-23 DIAGNOSIS — I10 PRIMARY HYPERTENSION: ICD-10-CM

## 2023-02-23 DIAGNOSIS — Z12.31 ENCOUNTER FOR SCREENING MAMMOGRAM FOR BREAST CANCER: ICD-10-CM

## 2023-02-23 DIAGNOSIS — E78.2 MIXED HYPERLIPIDEMIA: ICD-10-CM

## 2023-02-23 DIAGNOSIS — E11.311 DIABETIC MACULAR EDEMA WITH RETINOPATHY ASSOCIATED WITH TYPE 2 DIABETES MELLITUS: ICD-10-CM

## 2023-02-23 DIAGNOSIS — Z12.12 ENCOUNTER FOR COLORECTAL CANCER SCREENING: ICD-10-CM

## 2023-02-23 DIAGNOSIS — B35.3 TINEA PEDIS OF BOTH FEET: ICD-10-CM

## 2023-02-23 LAB
ANION GAP SERPL CALC-SCNC: 9 MEQ/L
BUN SERPL-MCNC: 18 MG/DL (ref 9.8–20.1)
CALCIUM SERPL-MCNC: 10.2 MG/DL (ref 8.4–10.2)
CHLORIDE SERPL-SCNC: 102 MMOL/L (ref 98–107)
CO2 SERPL-SCNC: 26 MMOL/L (ref 23–31)
CREAT SERPL-MCNC: 1.04 MG/DL (ref 0.55–1.02)
CREAT UR-MCNC: 73.6 MG/DL (ref 47–110)
CREAT/UREA NIT SERPL: 17
EST. AVERAGE GLUCOSE BLD GHB EST-MCNC: 332.1 MG/DL
GFR SERPLBLD CREATININE-BSD FMLA CKD-EPI: >60 MLS/MIN/1.73/M2
GLUCOSE SERPL-MCNC: 363 MG/DL (ref 82–115)
HBA1C MFR BLD: 13.2 %
MICROALBUMIN UR-MCNC: 52.7 UG/ML
MICROALBUMIN/CREAT RATIO PNL UR: 71.6 MG/GM CR (ref 0–30)
POTASSIUM SERPL-SCNC: 4 MMOL/L (ref 3.5–5.1)
SODIUM SERPL-SCNC: 137 MMOL/L (ref 136–145)

## 2023-02-23 PROCEDURE — 36415 COLL VENOUS BLD VENIPUNCTURE: CPT | Performed by: NURSE PRACTITIONER

## 2023-02-23 PROCEDURE — 82043 UR ALBUMIN QUANTITATIVE: CPT | Performed by: NURSE PRACTITIONER

## 2023-02-23 PROCEDURE — 83036 HEMOGLOBIN GLYCOSYLATED A1C: CPT | Performed by: NURSE PRACTITIONER

## 2023-02-23 PROCEDURE — 80048 BASIC METABOLIC PNL TOTAL CA: CPT | Performed by: NURSE PRACTITIONER

## 2023-02-23 PROCEDURE — 99214 PR OFFICE/OUTPT VISIT, EST, LEVL IV, 30-39 MIN: ICD-10-PCS | Mod: S$PBB,,, | Performed by: NURSE PRACTITIONER

## 2023-02-23 PROCEDURE — 99214 OFFICE O/P EST MOD 30 MIN: CPT | Mod: S$PBB,,, | Performed by: NURSE PRACTITIONER

## 2023-02-23 PROCEDURE — 99215 OFFICE O/P EST HI 40 MIN: CPT | Mod: PBBFAC | Performed by: NURSE PRACTITIONER

## 2023-02-23 RX ORDER — METFORMIN HYDROCHLORIDE 500 MG/1
500 TABLET ORAL 2 TIMES DAILY WITH MEALS
Qty: 180 TABLET | Refills: 3 | Status: SHIPPED | OUTPATIENT
Start: 2023-02-23 | End: 2023-10-10

## 2023-02-23 RX ORDER — MICONAZOLE NITRATE 2 %
POWDER (GRAM) TOPICAL 2 TIMES DAILY
Qty: 71 G | Refills: 0 | Status: SHIPPED | OUTPATIENT
Start: 2023-02-23 | End: 2023-05-04

## 2023-02-23 RX ORDER — VALSARTAN 40 MG/1
40 TABLET ORAL DAILY
Qty: 90 TABLET | Refills: 1 | Status: SHIPPED | OUTPATIENT
Start: 2023-02-23 | End: 2023-03-16

## 2023-02-23 NOTE — ASSESSMENT & PLAN NOTE
Rx Diovan 40 mg p.o. once daily, patient agreed.  Discussed with patient to monitor her salt intake below 3 g a day, follow low-cholesterol, low-fat diet.  Exercise at least 30 minutes a day up to 5 days a week a simple as brisk walking, eat fresh vegetables and fruit and to keep in mind serving size regarding fruits consumption due to diabetes.  Questions solicited and answered, patient verbalized.

## 2023-02-23 NOTE — PROGRESS NOTES
Patient Name: Paola Cm   : 1960  MRN: [id number]     SUBJECTIVE DATA:    CHIEF COMPLAINT:   Paola Cm is a 62 y.o. female who presents to clinic today with Diabetes and Hypertension        HPI:  62-year-old female presents to the clinic accompanied by her  and grand son to establish care. Patient has past medical history of HTN, HLD, DM2, NPDR , and DM2 neuropathy. Pt has been noncompliant with medical management for HTN, HLD, and DM2.     HTN:  Patient state  She stop taking her blood pressure medication for quite some time.  Current blood pressure 177/99.  Discussed with patient important to be on blood pressure medication.  Patient is hesitant because she wants to do it naturally.  Patient states she had purchased a nutritional supplementation called glucSocialtyze.  Instructed patient not familiar with the product and I am not sure how effective it is to manage glucose and blood pressure. Instructed patient when she reaches her goal with weight loss she can be weaned off medication and reassesses.  Discussed with patient we can initiate Diovan 40 mg p.o. once daily, continue taking aspirin 81 mg p.o. once daily patient agreed.  Discussed with patient to monitor her salt intake below 3 g a day, follow low-cholesterol, low-fat diet.  Exercise at least 30 minutes a day up to 5 days a week a simple as brisk walking, eat fresh vegetables and fruit and to keep in mind serving size regarding fruits consumption due to diabetes.  Questions solicited and answered, patient verbalized.    Diabetes: 2022 hemoglobin A1c 10.9% with estimated average glucose of 266.  Patient state she is currently not taking her medications as previously prescribed.  Patient state her fasting blood glucose has been in the 300s.  As mentioned earlier patient wants to try over-the-counter supplementation to manage her glucose as well her blood pressure.  Instructed patient will need to start on diabetic medication such  "as metformin 500 mg p.o. b.i.d., also to follow diabetic diet meal planning, exercising, lifestyle modification, calorie control.  Patient agreed and verbalized understanding.  Foot Exam completed.     Tinea pedis:  Rx Zeasorb topical powder b.i.d. for 7 days between toe webs and feet and then as needed, maintain good foot hygiene, continue to inspect feet daily if possible, continue to wear appropriate footwear.      Pt was advised to take her medications as prescribed.  Instructed her at risk for CVA or MI. Pt expressed verbal understanding.    Care gaps:  Mammogram, colonoscopy referral initiated.     Patient to return in 2 weeks for follow-up on her diabetes and hypertension.  Patient to come back fasting blood work.    Patient denies chest pain, shortness of breath, dyspnea on exertion, palpitations, peripheral edema, abdominal pain, nausea, vomiting, diarrhea, constipation, fatigue, fever, chills, dysuria,  hematuria, melena, or hematochezia.     HPI      ALLERGIES:   Review of patient's allergies indicates:   Allergen Reactions    Latex Itching         ROS:  Review of Systems   All other systems reviewed and are negative.      OBJECTIVE DATA:  Vital signs  Vitals:    02/23/23 1226 02/23/23 1348   BP: (!) 209/101 (!) 177/99   Pulse: 82    Resp: 18    Temp: 97.6 °F (36.4 °C)    TempSrc: Oral    SpO2: 100%    Weight: 73.9 kg (163 lb)    Height: 5' 1" (1.549 m)       Body mass index is 30.8 kg/m².    PHYSICAL EXAM:   Physical Exam  Vitals and nursing note reviewed.   Constitutional:       General: She is awake. She is not in acute distress.     Appearance: Normal appearance. She is well-developed, well-groomed and overweight. She is not ill-appearing, toxic-appearing or diaphoretic.   HENT:      Head: Normocephalic and atraumatic.      Right Ear: Tympanic membrane, ear canal and external ear normal.      Left Ear: Tympanic membrane, ear canal and external ear normal.      Nose: Nose normal. No congestion.      " Right Nostril: No epistaxis.      Left Nostril: No epistaxis.      Right Turbinates: Not swollen.      Left Turbinates: Not swollen.      Mouth/Throat:      Lips: Pink.      Mouth: Mucous membranes are moist.      Pharynx: Oropharynx is clear. Uvula midline.   Eyes:      General: Lids are normal. Gaze aligned appropriately.      Extraocular Movements: Extraocular movements intact.      Pupils: Pupils are equal, round, and reactive to light.   Neck:      Trachea: Trachea and phonation normal.   Cardiovascular:      Rate and Rhythm: Normal rate and regular rhythm.      Pulses: Normal pulses.           Radial pulses are 2+ on the right side and 2+ on the left side.        Dorsalis pedis pulses are 2+ on the right side and 2+ on the left side.        Posterior tibial pulses are 2+ on the right side and 2+ on the left side.      Heart sounds: Normal heart sounds. No murmur heard.  Pulmonary:      Effort: Pulmonary effort is normal.      Breath sounds: Normal breath sounds and air entry.   Abdominal:      Palpations: Abdomen is soft.      Tenderness: There is no abdominal tenderness. There is no right CVA tenderness or left CVA tenderness.   Musculoskeletal:         General: Normal range of motion.      Cervical back: Normal range of motion and neck supple. No rigidity or tenderness.      Right lower leg: No edema.      Left lower leg: No edema.      Right foot: Normal range of motion. No deformity, bunion, Charcot foot, foot drop or prominent metatarsal heads.      Left foot: Normal range of motion. No deformity, bunion, Charcot foot, foot drop or prominent metatarsal heads.   Feet:      Right foot:      Protective Sensation: 9 sites tested.  9 sites sensed.      Skin integrity: Skin breakdown present.      Toenail Condition: Right toenails are abnormally thick. Fungal disease present.     Left foot:      Protective Sensation: 9 sites tested.  9 sites sensed.      Skin integrity: Skin breakdown present.      Toenail  Condition: Left toenails are abnormally thick. Fungal disease present.     Comments: Bilateral feet toe webs skin maceration noted due moisture and fungal infection.  Lymphadenopathy:      Cervical: No cervical adenopathy.   Skin:     General: Skin is warm.      Capillary Refill: Capillary refill takes less than 2 seconds.      Findings: No rash.   Neurological:      General: No focal deficit present.      Mental Status: She is alert and oriented to person, place, and time. Mental status is at baseline.      GCS: GCS eye subscore is 4. GCS verbal subscore is 5. GCS motor subscore is 6.      Cranial Nerves: Cranial nerves 2-12 are intact.      Sensory: Sensation is intact.      Motor: Motor function is intact.      Coordination: Coordination is intact.      Gait: Gait is intact.   Psychiatric:         Attention and Perception: Attention and perception normal.         Mood and Affect: Mood and affect normal.         Speech: Speech normal.         Behavior: Behavior normal. Behavior is cooperative.         Thought Content: Thought content normal.         Cognition and Memory: Cognition and memory normal.        ASSESSMENT/PLAN:  1. Type 2 diabetes mellitus with hyperglycemia, without long-term current use of insulin  Assessment & Plan:  Rx metformin 500 mg p.o. b.i.d..  Instructed patient to follow diabetic diet meal planning.  exercising, lifestyle modification, calorie control.  Patient agreed and verbalized understanding.  Foot Exam completed.     Orders:  -     Hemoglobin A1C  -     Basic Metabolic Panel  -     Foot Exam Performed  -     metFORMIN (GLUCOPHAGE) 500 MG tablet; Take 1 tablet (500 mg total) by mouth 2 (two) times daily with meals.  Dispense: 180 tablet; Refill: 3  -     Microalbumin/Creatinine Ratio, Urine  -     miconazole NITRATE 2 % (ZEASORB AF) 2 % top powder; Apply topically 2 (two) times daily. for 7 days  Dispense: 71 g; Refill: 0    2. Primary hypertension  Assessment & Plan:  Rx Diovan 40 mg  p.o. once daily, patient agreed.  Discussed with patient to monitor her salt intake below 3 g a day, follow low-cholesterol, low-fat diet.  Exercise at least 30 minutes a day up to 5 days a week a simple as brisk walking, eat fresh vegetables and fruit and to keep in mind serving size regarding fruits consumption due to diabetes.  Questions solicited and answered, patient verbalized.    Orders:  -     Basic Metabolic Panel  -     valsartan (DIOVAN) 40 MG tablet; Take 1 tablet (40 mg total) by mouth once daily.  Dispense: 90 tablet; Refill: 1  -     Microalbumin/Creatinine Ratio, Urine    3. Mixed hyperlipidemia  Overview:  The patient presents with hyperlipidemia.  The patient reports tolerating the medication well and is in excellent compliance.  There have been no medication side effects.  The patient denies chest pain, neuropathy, and myalgias.  The patient has reduced fat intake and has been exercising.  Current treatment has included the medications listed in the med card.    Lab Results   Component Value Date    CHOL 269 04/20/2022       Lab Results   Component Value Date    HDL 34 04/20/2022     No results found for: LDLCALC    Lab Results   Component Value Date    TRIG 177 04/20/2022     No results found for: CHOLHDL  Lab Results   Component Value Date    ALT 15 02/28/2022    AST 14 02/28/2022    ALKPHOS 167 02/28/2022    BILITOT 0.4 02/28/2022           4. Tinea pedis of both feet  -     miconazole NITRATE 2 % (ZEASORB AF) 2 % top powder; Apply topically 2 (two) times daily. for 7 days  Dispense: 71 g; Refill: 0    5. Diabetic macular edema with retinopathy associated with type 2 diabetes mellitus    6. Encounter for screening mammogram for breast cancer  -     Mammo Digital Screening Bil[facility] w/ [facility]; Future; Expected date: 02/23/2023    7. Encounter for colorectal cancer screening  -     Ambulatory referral/consult to Gastroenterology; Future; Expected date: 03/02/2023    8. General patient  noncompliance  Overview:  Pt has not been compliant with atorvastatin  Pt has had issues with compliance with ADA eating habit but doing much better with following ADA diet             RESULTS:  No results found for this or any previous visit (from the past 1008 hour(s)).      Follow Up:  Follow up in about 2 weeks (around 3/9/2023).      Previous medical history/lab work/radiology reviewed and considered during medical management decisions.   Medication list reviewed and medication reconciliation performed.  Patient was provided  and care about his/her current diagnosis (es) and medications including risk/benefit and side effects/adverse events, over the counter medication uses/doses, home self-care and contact precautions,  and red flags and indications for when to seek immediate medical attention.   Patient was advised to continue compliance with current medication list and medical recommendations.  Patient dvised continued compliance with recommended eating habits/ diets for medical conditions and exercise 150 minutes/ week (if possible) for medical condition (s).  Educational handouts and instructions on selected disease management in AVS (After Visit Summary).    All of the patient's questions were answered to patient's satisfaction.   The patient was receptive, expressed verbal understanding and agreement the above plan.           This note was created with the assistance of a voice recognition software or phone dictation. There may be transcription errors as a result of using this technology however minimal. Effort has been made to assure accuracy of transcription but any obvious errors or omissions should be clarified with the author of the document

## 2023-02-23 NOTE — ASSESSMENT & PLAN NOTE
Rx metformin 500 mg p.o. b.i.d..  Instructed patient to follow diabetic diet meal planning.  exercising, lifestyle modification, calorie control.  Patient agreed and verbalized understanding.  Foot Exam completed.

## 2023-02-24 NOTE — PROGRESS NOTES
Notify patient regarding blood work and urine test results  Fasting blood glucose 363, hemoglobin 1 AC at 13.2% with estimated average blood glucose at 332.  Please start medication as prescribed will increase metformin to a 1000 mg when you come back on your next visit.  Instruct patient Lantus subcutaneous injection is still on the table if she changes her mind we can start.  Protein noted in the urine and that is due to elevated blood pressure as well elevated blood sugars.  Stay hydrated with water, follow discharge instructions.  Take blood pressure medication.

## 2023-03-02 ENCOUNTER — TELEPHONE (OUTPATIENT)
Dept: FAMILY MEDICINE | Facility: CLINIC | Age: 63
End: 2023-03-02

## 2023-03-02 NOTE — TELEPHONE ENCOUNTER
Called patient to give results. Patient verbalized understanding. Patient states that she does want to increase Metformin nor does she want insulin at this time.

## 2023-03-02 NOTE — TELEPHONE ENCOUNTER
----- Message from WALLY Morrow sent at 2/24/2023  1:31 PM CST -----  Notify patient regarding blood work and urine test results  Fasting blood glucose 363, hemoglobin 1 AC at 13.2% with estimated average blood glucose at 332.  Please start medication as prescribed will increase metformin to a 1000 mg when you come back on your next visit.  Instruct patient Lantus subcutaneous injection is still on the table if she changes her mind we can start.  Protein noted in the urine and that is due to elevated blood pressure as well elevated blood sugars.  Stay hydrated with water, follow discharge instructions.  Take blood pressure medication.

## 2023-03-07 ENCOUNTER — TELEPHONE (OUTPATIENT)
Dept: FAMILY MEDICINE | Facility: CLINIC | Age: 63
End: 2023-03-07

## 2023-03-07 NOTE — TELEPHONE ENCOUNTER
----- Message from Barbara Peres sent at 3/6/2023  2:09 PM CST -----  Regarding: Call back  General Phone Message    Caller is:  (x ) Patient  (  ) Family  (  ) Pharmacy    (  ) Home Health  (  ) Other     Provider: WALLY Meléndez    Last Visit:    Next Visit: 3/9/2023    Reason for Call: Patient is asking for a call back to discuss a paper she needs Jerardo to fill out.                   Best Time to Contact:    Preferred Phone Number:

## 2023-03-07 NOTE — TELEPHONE ENCOUNTER
Spoke to patient regarding this information. Informed patient that this will be discussed at her visit Thursday.

## 2023-03-09 ENCOUNTER — OFFICE VISIT (OUTPATIENT)
Dept: FAMILY MEDICINE | Facility: CLINIC | Age: 63
End: 2023-03-09

## 2023-03-09 VITALS
SYSTOLIC BLOOD PRESSURE: 154 MMHG | HEIGHT: 61 IN | HEART RATE: 86 BPM | RESPIRATION RATE: 18 BRPM | TEMPERATURE: 98 F | OXYGEN SATURATION: 99 % | WEIGHT: 162 LBS | DIASTOLIC BLOOD PRESSURE: 96 MMHG | BODY MASS INDEX: 30.58 KG/M2

## 2023-03-09 DIAGNOSIS — I10 PRIMARY HYPERTENSION: ICD-10-CM

## 2023-03-09 DIAGNOSIS — E11.311 DIABETIC MACULAR EDEMA WITH RETINOPATHY ASSOCIATED WITH TYPE 2 DIABETES MELLITUS: Primary | ICD-10-CM

## 2023-03-09 DIAGNOSIS — M25.561 BILATERAL CHRONIC KNEE PAIN: ICD-10-CM

## 2023-03-09 DIAGNOSIS — G89.29 BILATERAL CHRONIC KNEE PAIN: ICD-10-CM

## 2023-03-09 DIAGNOSIS — E11.65 TYPE 2 DIABETES MELLITUS WITH HYPERGLYCEMIA, WITHOUT LONG-TERM CURRENT USE OF INSULIN: ICD-10-CM

## 2023-03-09 DIAGNOSIS — M25.562 BILATERAL CHRONIC KNEE PAIN: ICD-10-CM

## 2023-03-09 PROCEDURE — 99215 OFFICE O/P EST HI 40 MIN: CPT | Mod: PBBFAC | Performed by: NURSE PRACTITIONER

## 2023-03-09 PROCEDURE — 99214 PR OFFICE/OUTPT VISIT, EST, LEVL IV, 30-39 MIN: ICD-10-PCS | Mod: S$PBB,,, | Performed by: NURSE PRACTITIONER

## 2023-03-09 PROCEDURE — 99214 OFFICE O/P EST MOD 30 MIN: CPT | Mod: S$PBB,,, | Performed by: NURSE PRACTITIONER

## 2023-03-09 NOTE — ASSESSMENT & PLAN NOTE
Patient noncompliant with her metformin medication.  Patient state is causing her to have diarrhea.  Instructed patient it is a side effect of the medication, we can start with 1 tablet  broken into half and to be taken twice a day and reassess GI disturbance.  Will advance medication to take 2 tablets a day.  Patient continued to decline subcutaneous Lantus at bedtime or  the addition of 2nd oral medication. Instructed patient if she is unable to tolerate we can change medications,also to follow diabetic diet meal planning, exercising, lifestyle modification, calorie control. Patient to return in one week for reassessment.  Patient verbalized understanding and agreed to plan of care.

## 2023-03-09 NOTE — ASSESSMENT & PLAN NOTE
Discussed with patient important to be on blood pressure medication.  Instructed patient when she reaches her goal with weight loss she can be weaned off medication and reassesses.  Discussed with patient to continue to take Diovan 40 mg p.o. once daily, continue taking aspirin 81 mg p.o. once daily patient agreed.  Discussed with patient to monitor her salt intake below 3 g a day, follow low-cholesterol, low-fat diet.  Exercise at least 30 minutes a day up to 5 days a week a simple as brisk walking, eat fresh vegetables and fruit and to keep in mind serving size regarding fruits consumption due to diabetes.  Patient to return in one week for blood pressure check Questions solicited and answered, patient verbalized.

## 2023-03-09 NOTE — LETTER
March 9, 2023    Paola Cm  [address] [facility] [facility] [address]             [facility] [address] [place] [address]  Phone: [phone]  Fax: [phone] To whom it may concern:    This patient is currently under my care. [facility] for [facility]obics. This patient needs assistance with her ADLs and will benefit from having someone to assist her while she is at your facility.       If you have any questions, please give us a call at [phone].          WALLY Coreas

## 2023-03-09 NOTE — ASSESSMENT & PLAN NOTE
Rest, ice, elevate.  OTC Tylenol arthritis and take as directed on the label.  Stay hydrated with water.  Continue water aerobics.  Read discharge education material.

## 2023-03-09 NOTE — PROGRESS NOTES
Patient Name: Paola Cm   : 1960  MRN: [id number]     SUBJECTIVE DATA:    CHIEF COMPLAINT:   Paola Cm is a 62 y.o. female who presents to clinic today with Hypertension and Leg Pain        HPI: 62-year-old female presents to the clinic accompanied by her grandchild for follow-up on her diabetes and hypertension. Patient has past medical history of HTN, HLD, DM2, NPDR , and DM2 neuropathy. Pt has been continue to be noncompliant with medical management for HTN, HLD, DM2.     HTN:  Current blood pressure 189/113, repeated 154/96.  Patient was prescribed Diovan 40 mg p.o. once daily and she is not taking her medication as prescribed she continue to skip doses and has not taken her medications this morning.  Discussed with patient important to be on blood pressure medication.  Patient continue to be hesitant because she wants to do it naturally. Instructed patient when she reaches her goal with weight loss she can be weaned off medication and reassesses.  Discussed with patient to continue to take Diovan 40 mg p.o. once daily, continue taking aspirin 81 mg p.o. once daily patient agreed.  Discussed with patient to monitor her salt intake below 3 g a day, follow low-cholesterol, low-fat diet.  Exercise at least 30 minutes a day up to 5 days a week a simple as brisk walking, eat fresh vegetables and fruit and to keep in mind serving size regarding fruits consumption due to diabetes.  Patient to return in one week for blood pressure check Questions solicited and answered, patient verbalized.     Diabetes: 2023 hemoglobin A1c 13.2% with estimated average glucose of 332.  Patient continued to have elevated fasting blood cause at home in the 200s.  Patient noncompliant with her metformin medication.  Patient state is causing her to have diarrhea.  Instructed patient it is a side effect of the medication, we can start with 1 tablet  broken into half and to be taken twice a day and reassess GI  "disturbance.  Will advance medication to take 2 tablets a day.  Patient continued to decline subcutaneous Lantus at bedtime or the addition of 2nd oral medication. Instructed patient if she is unable to tolerate we can change medications,also to follow diabetic diet meal planning, exercising, lifestyle modification, calorie control. Patient to return in one week for reassessment.  Patient verbalized understanding and agreed to plan of care.    Bilateral chronic knee pain:  Patient state her knees continue to hurt, patient would like to find out was the cause of her knee pain, recommended initiating x-rays, patient declined.  Discussed conservative therapy such as ice, elevate, rest.  OTC Tylenol arthritis as needed.  Patient state" are like taking pills".  Patient state" I will think about x-rays".  Patient read discharge education material.    Gym excuse:  Patient requesting a note to allow a 2nd person to come with her as a" " just in case she needs assistant with her exercising activity and water aerobics.    Patient denies chest pain, shortness of breath, dyspnea on exertion, palpitations, peripheral edema, abdominal pain, nausea, vomiting, diarrhea, constipation, fatigue, fever, chills, dysuria,  hematuria, melena, or hematochezia.   HPI      ALLERGIES:   Review of patient's allergies indicates:   Allergen Reactions    Latex Itching         ROS:  Review of Systems   All other systems reviewed and are negative.      OBJECTIVE DATA:  Vital signs  Vitals:    03/09/23 1106 03/09/23 1146   BP: (!) 189/113 (!) 154/96   Pulse: 86    Resp: 18    Temp: 97.8 °F (36.6 °C)    TempSrc: Oral    SpO2: 99%    Weight: 73.5 kg (162 lb)    Height: 5' 1" (1.549 m)       Body mass index is 30.61 kg/m².    PHYSICAL EXAM:   Physical Exam  Vitals and nursing note reviewed.   Constitutional:       General: She is awake. She is not in acute distress.     Appearance: Normal appearance. She is well-developed, well-groomed and " overweight. She is not ill-appearing, toxic-appearing or diaphoretic.   HENT:      Head: Normocephalic and atraumatic.      Right Ear: Tympanic membrane, ear canal and external ear normal.      Left Ear: Tympanic membrane, ear canal and external ear normal.      Nose: Nose normal. No congestion or rhinorrhea.      Mouth/Throat:      Mouth: Mucous membranes are moist.      Pharynx: Oropharynx is clear. No posterior oropharyngeal erythema.   Eyes:      General: Lids are normal.      Extraocular Movements: Extraocular movements intact.      Conjunctiva/sclera: Conjunctivae normal.      Pupils: Pupils are equal, round, and reactive to light.   Neck:      Trachea: Trachea and phonation normal.   Cardiovascular:      Rate and Rhythm: Normal rate and regular rhythm.      Pulses: Normal pulses.           Radial pulses are 2+ on the right side and 2+ on the left side.        Dorsalis pedis pulses are 2+ on the right side and 2+ on the left side.      Heart sounds: Normal heart sounds. No murmur heard.  Pulmonary:      Effort: Pulmonary effort is normal.      Breath sounds: Normal breath sounds and air entry. No wheezing or rhonchi.   Abdominal:      General: Bowel sounds are normal.      Palpations: Abdomen is soft.      Tenderness: There is no abdominal tenderness. There is no right CVA tenderness or left CVA tenderness.   Musculoskeletal:         General: Tenderness (Generalized knee tenderness, no edema, no swelling, no crepitus, valgus and varus test negative.) present. Normal range of motion.      Cervical back: Normal range of motion and neck supple. No rigidity or tenderness.      Right lower leg: No edema.      Left lower leg: No edema.   Lymphadenopathy:      Cervical: No cervical adenopathy.   Skin:     General: Skin is warm and dry.      Capillary Refill: Capillary refill takes less than 2 seconds.      Findings: No rash.   Neurological:      General: No focal deficit present.      Mental Status: She is alert and  oriented to person, place, and time. Mental status is at baseline.      GCS: GCS eye subscore is 4. GCS verbal subscore is 5. GCS motor subscore is 6.      Cranial Nerves: Cranial nerves 2-12 are intact.      Sensory: Sensation is intact.      Motor: Motor function is intact.      Coordination: Coordination is intact.      Gait: Gait is intact. Gait normal.   Psychiatric:         Attention and Perception: Attention and perception normal.         Mood and Affect: Mood normal.         Speech: Speech normal.         Behavior: Behavior normal. Behavior is cooperative.         Thought Content: Thought content normal.         Cognition and Memory: Cognition and memory normal.         Judgment: Judgment normal.        ASSESSMENT/PLAN:  1. 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 Primary hypertension  Assessment & Plan:  Discussed with patient important to be on blood pressure medication.  Instructed patient when she reaches her goal with weight loss she can be weaned off medication and reassesses.  Discussed with patient to continue to take Diovan 40 mg p.o. once daily, continue taking aspirin 81 mg p.o. once daily patient agreed.  Discussed with patient to monitor her salt intake below 3 g a day, follow low-cholesterol, low-fat diet.  Exercise at least 30 minutes a day up to 5 days a week a simple as brisk walking, eat fresh vegetables and fruit and to keep in mind serving size regarding fruits consumption due to diabetes.  Patient to return in one week for blood pressure check Questions solicited and answered, patient verbalized.      3. Bilateral chronic knee pain  Assessment & Plan:  Rest, ice, elevate.  OTC Tylenol arthritis and take as directed on the label.  Stay hydrated with water.  Continue water aerobics.  Read discharge education material.      4. Type 2 diabetes mellitus with hyperglycemia, without long-term current use of insulin  Assessment & Plan:  Patient  noncompliant with her metformin medication.  Patient state is causing her to have diarrhea.  Instructed patient it is a side effect of the medication, we can start with 1 tablet  broken into half and to be taken twice a day and reassess GI disturbance.  Will advance medication to take 2 tablets a day.  Patient continued to decline subcutaneous Lantus at bedtime or  the addition of 2nd oral medication. Instructed patient if she is unable to tolerate we can change medications,also to follow diabetic diet meal planning, exercising, lifestyle modification, calorie control. Patient to return in one week for reassessment.  Patient verbalized understanding and agreed to plan of care.             RESULTS:  Recent Results (from the past 1008 hour(s))   Hemoglobin A1C    Collection Time: 02/23/23  1:48 PM   Result Value Ref Range    Hemoglobin A1c 13.2 (H) <=7.0 %    Estimated Average Glucose 332.1 mg/dL   Basic Metabolic Panel    Collection Time: 02/23/23  1:48 PM   Result Value Ref Range    Sodium Level 137 136 - 145 mmol/L    Potassium Level 4.0 3.5 - 5.1 mmol/L    Chloride 102 98 - 107 mmol/L    Carbon Dioxide 26 23 - 31 mmol/L    Glucose Level 363 (H) 82 - 115 mg/dL    Blood Urea Nitrogen 18.0 9.8 - 20.1 mg/dL    Creatinine 1.04 (H) 0.55 - 1.02 mg/dL    BUN/Creatinine Ratio 17     Calcium Level Total 10.2 8.4 - 10.2 mg/dL    Anion Gap 9.0 mEq/L    eGFR >60 mls/min/1.73/m2   Microalbumin/Creatinine Ratio, Urine    Collection Time: 02/23/23  1:48 PM   Result Value Ref Range    Urine Microalbumin 52.7 (H) <=30.0 ug/ml    Urine Creatinine 73.6 47.0 - 110.0 mg/dL    Microalbumin Creatinine Ratio 71.6 (H) 0.0 - 30.0 mg/gm Cr         Follow Up:  Follow up in about 1 week (around 3/16/2023).      Previous medical history/lab work/radiology reviewed and considered during medical management decisions.   Medication list reviewed and medication reconciliation performed.  Patient was provided  and care about his/her current  diagnosis (es) and medications including risk/benefit and side effects/adverse events, over the counter medication uses/doses, home self-care and contact precautions,  and red flags and indications for when to seek immediate medical attention.   Patient was advised to continue compliance with current medication list and medical recommendations.  Patient dvised continued compliance with recommended eating habits/ diets for medical conditions and exercise 150 minutes/ week (if possible) for medical condition (s).  Educational handouts and instructions on selected disease management in AVS (After Visit Summary).    All of the patient's questions were answered to patient's satisfaction.   The patient was receptive, expressed verbal understanding and agreement the above plan.       This note was created with the assistance of a voice recognition software or phone dictation. There may be transcription errors as a result of using this technology however minimal. Effort has been made to assure accuracy of transcription but any obvious errors or omissions should be clarified with the author of the document

## 2023-03-16 ENCOUNTER — OFFICE VISIT (OUTPATIENT)
Dept: FAMILY MEDICINE | Facility: CLINIC | Age: 63
End: 2023-03-16

## 2023-03-16 VITALS
OXYGEN SATURATION: 100 % | HEIGHT: 61 IN | SYSTOLIC BLOOD PRESSURE: 162 MMHG | RESPIRATION RATE: 16 BRPM | BODY MASS INDEX: 30.96 KG/M2 | HEART RATE: 84 BPM | WEIGHT: 164 LBS | TEMPERATURE: 98 F | DIASTOLIC BLOOD PRESSURE: 108 MMHG

## 2023-03-16 DIAGNOSIS — E78.2 MIXED HYPERLIPIDEMIA: ICD-10-CM

## 2023-03-16 DIAGNOSIS — I10 PRIMARY HYPERTENSION: Primary | ICD-10-CM

## 2023-03-16 DIAGNOSIS — E11.65 TYPE 2 DIABETES MELLITUS WITH HYPERGLYCEMIA, WITHOUT LONG-TERM CURRENT USE OF INSULIN: ICD-10-CM

## 2023-03-16 PROCEDURE — 99213 PR OFFICE/OUTPT VISIT, EST, LEVL III, 20-29 MIN: ICD-10-PCS | Mod: S$PBB,,, | Performed by: NURSE PRACTITIONER

## 2023-03-16 PROCEDURE — 99213 OFFICE O/P EST LOW 20 MIN: CPT | Mod: S$PBB,,, | Performed by: NURSE PRACTITIONER

## 2023-03-16 PROCEDURE — 99215 OFFICE O/P EST HI 40 MIN: CPT | Mod: PBBFAC | Performed by: NURSE PRACTITIONER

## 2023-03-16 NOTE — ASSESSMENT & PLAN NOTE
Discussed with patient important to be on blood pressure medication and we can always titrate after you lose weight and you feel that is no need to take blood pressure medication anymore.  Discussed with patient to start her new adjusted dose of Diovan 80 mg once daily today, continue taking aspirin 81 mg p.o. once daily patient agreed.  Discussed with patient to monitor her salt intake below 3 g a day, follow low-cholesterol, low-fat diet.  Exercise at least 30 minutes a day up to 5 days a week a simple as brisk walking, eat fresh vegetables and fruit and to keep in mind serving size regarding fruits consumption due to diabetes.  Questions solicited and answered, patient verbalized.

## 2023-03-16 NOTE — ASSESSMENT & PLAN NOTE
Continue medications as prescribed.  Discussed with patient will continue to monitor and reassess hemoglobin A1c in May,2023. follow diabetic diet meal planning, exercising, lifestyle modification, calorie control. Patient to return in one week for reassessment.  Patient verbalized understanding and agreed to plan of care.

## 2023-03-16 NOTE — ASSESSMENT & PLAN NOTE
Patient declines treatment at this time.   Discussed with patient will draw fasting lipid on her next visit in May 2023.  Questions solicited and answered, patient verbalized understanding.  Follow low-cholesterol, low-fat diet, increase fiber intake to 28 g per day if possible, stay hydrated with water, stay active.

## 2023-03-16 NOTE — PROGRESS NOTES
Patient Name: Paola Cm   : 1960  MRN: [id number]     SUBJECTIVE DATA:    CHIEF COMPLAINT:   Paola Cm is a 62 y.o. female who presents to clinic today with Follow-up (Follow up blood pressure)        HPI:  62-year-old female presents to the clinic follow-up on her hypertension and diabetes medication compliance.  Patient is here accompanied by her son and her grandson. Patient has past medical history of HTN, HLD, DM2, NPDR , and DM2 neuropathy. Pt has been noncompliant with medical management for HTN, HLD, and DM2.      HTN:  Patient state she did not take her blood pressure medication this morning.  She continued to be hesitant about taking her blood pressure medication because she wants to do it naturally.  Current blood pressure 162/108.   Discussed with patient important to be on blood pressure medication and we can always titrate after you lose weight and you feel that is no need to take blood pressure medication anymore.  Discussed with patient to start her new adjusted dose of Diovan 80 mg once daily today, continue taking aspirin 81 mg p.o. once daily patient agreed.  Discussed with patient to monitor her salt intake below 3 g a day, follow low-cholesterol, low-fat diet.  Exercise at least 30 minutes a day up to 5 days a week a simple as brisk walking, eat fresh vegetables and fruit and to keep in mind serving size regarding fruits consumption due to diabetes.  Questions solicited and answered, patient verbalized.     Diabetes: 2023 hemoglobin A1c 13.2% with estimated average glucose of 332.1.  From previous visit patient had compliant with metformin to GI disturbances and was Instructed to start with 1 tablet  broken into half and to be taken twice a day and reassess GI disturbance.  Patient state she has been tolerating the medication by dividing her doses.  Discussed with patient will continue to monitor and reassess hemoglobin A1c in May,2023. follow diabetic diet meal planning,  "exercising, lifestyle modification, calorie control. Patient to return in one week for reassessment.  Patient verbalized understanding and agreed to plan of care.     Hyperlipidemia: 07/08/2022 total cholesterol 288, HDL 39, triglyceride 178, .  Patient declined initiating cholesterol medication because she wants to see if her diet and lifestyle modification and exercise will improve her numbers.  Discussed with patient will draw fasting lipid on her next visit in May 2023.  Questions solicited and answered, patient verbalized understanding.    Patient denies chest pain, shortness of breath, dyspnea on exertion, palpitations, peripheral edema, abdominal pain, nausea, vomiting, diarrhea, constipation, fatigue, fever, chills, dysuria,  hematuria, melena, or hematochezia. HPI      ALLERGIES:   Review of patient's allergies indicates:   Allergen Reactions    Latex Itching         ROS:  Review of Systems   All other systems reviewed and are negative.      OBJECTIVE DATA:  Vital signs  Vitals:    03/16/23 1404 03/16/23 1407 03/16/23 1408 03/16/23 1443   BP: (!) 201/117 (!) 196/115 (!) 181/117 (!) 162/108   Pulse: 82 84 84    Resp: 16  16    Temp: 98.3 °F (36.8 °C)  97.6 °F (36.4 °C)    SpO2: 98%  100%    Weight: 74.4 kg (164 lb)      Height: 5' 1" (1.549 m)         Body mass index is 30.99 kg/m².    PHYSICAL EXAM:   Physical Exam  Vitals and nursing note reviewed.   Constitutional:       General: She is awake. She is not in acute distress.     Appearance: Normal appearance. She is well-developed and well-groomed. She is not ill-appearing, toxic-appearing or diaphoretic.   HENT:      Head: Normocephalic and atraumatic.      Right Ear: Tympanic membrane, ear canal and external ear normal.      Left Ear: Tympanic membrane, ear canal and external ear normal.      Nose: Nose normal. No congestion or rhinorrhea.      Mouth/Throat:      Mouth: Mucous membranes are moist.      Pharynx: Oropharynx is clear. No posterior " oropharyngeal erythema.   Eyes:      General: Lids are normal.      Extraocular Movements: Extraocular movements intact.      Conjunctiva/sclera: Conjunctivae normal.      Pupils: Pupils are equal, round, and reactive to light.   Cardiovascular:      Rate and Rhythm: Normal rate and regular rhythm.      Pulses: Normal pulses.           Radial pulses are 2+ on the right side and 2+ on the left side.      Heart sounds: Normal heart sounds. No murmur heard.  Pulmonary:      Effort: Pulmonary effort is normal.      Breath sounds: Normal breath sounds and air entry. No wheezing or rhonchi.   Abdominal:      General: Bowel sounds are normal.      Palpations: Abdomen is soft.      Tenderness: There is no abdominal tenderness. There is no right CVA tenderness or left CVA tenderness.   Musculoskeletal:         General: Normal range of motion.      Cervical back: Normal range of motion and neck supple. No rigidity or tenderness.      Right lower leg: No edema.      Left lower leg: No edema.   Lymphadenopathy:      Cervical: No cervical adenopathy.   Skin:     General: Skin is warm and dry.      Capillary Refill: Capillary refill takes less than 2 seconds.      Findings: No rash.   Neurological:      General: No focal deficit present.      Mental Status: She is alert and oriented to person, place, and time. Mental status is at baseline.   Psychiatric:         Mood and Affect: Mood normal.         Behavior: Behavior normal. Behavior is cooperative.         Thought Content: Thought content normal.         Judgment: Judgment normal.        ASSESSMENT/PLAN:  1. Primary hypertension  Assessment & Plan:   Discussed with patient important to be on blood pressure medication and we can always titrate after you lose weight and you feel that is no need to take blood pressure medication anymore.  Discussed with patient to start her new adjusted dose of Diovan 80 mg once daily today, continue taking aspirin 81 mg p.o. once daily patient  agreed.  Discussed with patient to monitor her salt intake below 3 g a day, follow low-cholesterol, low-fat diet.  Exercise at least 30 minutes a day up to 5 days a week a simple as brisk walking, eat fresh vegetables and fruit and to keep in mind serving size regarding fruits consumption due to diabetes.  Questions solicited and answered, patient verbalized.      2. Type 2 diabetes mellitus with hyperglycemia, without long-term current use of insulin  Assessment & Plan:  Continue medications as prescribed.  Discussed with patient will continue to monitor and reassess hemoglobin A1c in May,2023. follow diabetic diet meal planning, exercising, lifestyle modification, calorie control. Patient to return in one week for reassessment.  Patient verbalized understanding and agreed to plan of care.       3. Mixed hyperlipidemia  Overview:  The patient presents with hyperlipidemia.  The patient reports tolerating the medication well and is in excellent compliance.  There have been no medication side effects.  The patient denies chest pain, neuropathy, and myalgias.  The patient has reduced fat intake and has been exercising.  Current treatment has included the medications listed in the med card.    Lab Results   Component Value Date    CHOL 269 04/20/2022       Lab Results   Component Value Date    HDL 34 04/20/2022     No results found for: LDLCALC    Lab Results   Component Value Date    TRIG 177 04/20/2022     No results found for: CHOLHDL  Lab Results   Component Value Date    ALT 15 02/28/2022    AST 14 02/28/2022    ALKPHOS 167 02/28/2022    BILITOT 0.4 02/28/2022         Assessment & Plan:  Patient declines treatment at this time.   Discussed with patient will draw fasting lipid on her next visit in May 2023.  Questions solicited and answered, patient verbalized understanding.  Follow low-cholesterol, low-fat diet, increase fiber intake to 28 g per day if possible, stay hydrated with water, stay active.              RESULTS:  Recent Results (from the past 1008 hour(s))   Hemoglobin A1C    Collection Time: 02/23/23  1:48 PM   Result Value Ref Range    Hemoglobin A1c 13.2 (H) <=7.0 %    Estimated Average Glucose 332.1 mg/dL   Basic Metabolic Panel    Collection Time: 02/23/23  1:48 PM   Result Value Ref Range    Sodium Level 137 136 - 145 mmol/L    Potassium Level 4.0 3.5 - 5.1 mmol/L    Chloride 102 98 - 107 mmol/L    Carbon Dioxide 26 23 - 31 mmol/L    Glucose Level 363 (H) 82 - 115 mg/dL    Blood Urea Nitrogen 18.0 9.8 - 20.1 mg/dL    Creatinine 1.04 (H) 0.55 - 1.02 mg/dL    BUN/Creatinine Ratio 17     Calcium Level Total 10.2 8.4 - 10.2 mg/dL    Anion Gap 9.0 mEq/L    eGFR >60 mls/min/1.73/m2   Microalbumin/Creatinine Ratio, Urine    Collection Time: 02/23/23  1:48 PM   Result Value Ref Range    Urine Microalbumin 52.7 (H) <=30.0 ug/ml    Urine Creatinine 73.6 47.0 - 110.0 mg/dL    Microalbumin Creatinine Ratio 71.6 (H) 0.0 - 30.0 mg/gm Cr         Follow Up:  Follow up in about 7 weeks (around 5/4/2023).      Previous medical history/lab work/radiology reviewed and considered during medical management decisions.   Medication list reviewed and medication reconciliation performed.  Patient was provided  and care about his/her current diagnosis (es) and medications including risk/benefit and side effects/adverse events, over the counter medication uses/doses, home self-care and contact precautions,  and red flags and indications for when to seek immediate medical attention.   Patient was advised to continue compliance with current medication list and medical recommendations.  Patient dvised continued compliance with recommended eating habits/ diets for medical conditions and exercise 150 minutes/ week (if possible) for medical condition (s).  Educational handouts and instructions on selected disease management in AVS (After Visit Summary).    All of the patient's questions were answered to patient's satisfaction.    The patient was receptive, expressed verbal understanding and agreement the above plan.       This note was created with the assistance of a voice recognition software or phone dictation. There may be transcription errors as a result of using this technology however minimal. Effort has been made to assure accuracy of transcription but any obvious errors or omissions should be clarified with the author of the document

## 2023-05-04 ENCOUNTER — OFFICE VISIT (OUTPATIENT)
Dept: FAMILY MEDICINE | Facility: CLINIC | Age: 63
End: 2023-05-04

## 2023-05-04 VITALS
DIASTOLIC BLOOD PRESSURE: 114 MMHG | OXYGEN SATURATION: 100 % | SYSTOLIC BLOOD PRESSURE: 194 MMHG | HEIGHT: 61 IN | BODY MASS INDEX: 30.96 KG/M2 | WEIGHT: 164 LBS | RESPIRATION RATE: 18 BRPM | TEMPERATURE: 98 F

## 2023-05-04 DIAGNOSIS — G89.29 BILATERAL CHRONIC KNEE PAIN: ICD-10-CM

## 2023-05-04 DIAGNOSIS — M25.561 BILATERAL CHRONIC KNEE PAIN: ICD-10-CM

## 2023-05-04 DIAGNOSIS — Z12.12 ENCOUNTER FOR COLORECTAL CANCER SCREENING: ICD-10-CM

## 2023-05-04 DIAGNOSIS — E11.65 TYPE 2 DIABETES MELLITUS WITH HYPERGLYCEMIA, WITHOUT LONG-TERM CURRENT USE OF INSULIN: Primary | ICD-10-CM

## 2023-05-04 DIAGNOSIS — M25.562 BILATERAL CHRONIC KNEE PAIN: ICD-10-CM

## 2023-05-04 DIAGNOSIS — Z12.11 ENCOUNTER FOR COLORECTAL CANCER SCREENING: ICD-10-CM

## 2023-05-04 DIAGNOSIS — M17.0 PRIMARY OSTEOARTHRITIS OF BOTH KNEES: ICD-10-CM

## 2023-05-04 DIAGNOSIS — I10 PRIMARY HYPERTENSION: ICD-10-CM

## 2023-05-04 LAB
EST. AVERAGE GLUCOSE BLD GHB EST-MCNC: 306.3 MG/DL
HBA1C MFR BLD: 12.3 %

## 2023-05-04 PROCEDURE — 99214 PR OFFICE/OUTPT VISIT, EST, LEVL IV, 30-39 MIN: ICD-10-PCS | Mod: S$PBB,,, | Performed by: NURSE PRACTITIONER

## 2023-05-04 PROCEDURE — 36415 COLL VENOUS BLD VENIPUNCTURE: CPT | Performed by: NURSE PRACTITIONER

## 2023-05-04 PROCEDURE — 83036 HEMOGLOBIN GLYCOSYLATED A1C: CPT | Performed by: NURSE PRACTITIONER

## 2023-05-04 PROCEDURE — 99215 OFFICE O/P EST HI 40 MIN: CPT | Mod: PBBFAC | Performed by: NURSE PRACTITIONER

## 2023-05-04 PROCEDURE — 99214 OFFICE O/P EST MOD 30 MIN: CPT | Mod: S$PBB,,, | Performed by: NURSE PRACTITIONER

## 2023-05-04 RX ORDER — AMLODIPINE BESYLATE 10 MG/1
10 TABLET ORAL DAILY
Qty: 90 TABLET | Refills: 3 | Status: SHIPPED | OUTPATIENT
Start: 2023-05-04 | End: 2023-10-10

## 2023-05-04 RX ORDER — DICLOFENAC SODIUM 10 MG/G
GEL TOPICAL 4 TIMES DAILY
Qty: 300 G | Refills: 3 | Status: SHIPPED | OUTPATIENT
Start: 2023-05-04 | End: 2023-10-10

## 2023-05-04 RX ORDER — VALSARTAN 80 MG/1
80 TABLET ORAL DAILY
Qty: 90 TABLET | Refills: 3 | Status: SHIPPED | OUTPATIENT
Start: 2023-05-04 | End: 2023-10-10

## 2023-05-04 RX ORDER — DICLOFENAC SODIUM 10 MG/G
GEL TOPICAL 4 TIMES DAILY
Qty: 300 G | Refills: 3 | Status: SHIPPED | OUTPATIENT
Start: 2023-05-04 | End: 2023-05-04

## 2023-05-04 NOTE — PROGRESS NOTES
Patient Name: Paola Cm   : 1960  MRN: [id number]     SUBJECTIVE DATA:    CHIEF COMPLAINT:   Paola Cm is a 62 y.o. female who presents to clinic today with No chief complaint on file.        HPI: 62-year-old female presents to the clinic follow-up on her hypertension and diabetes medication compliance.  Patient is here accompanied by her grandson. Patient has past medical history of HTN, HLD, DM2.      HTN:  Current blood pressure 162/108.  Patient does not monitor blood pressure at home and verbalized she does not want to know what is her blood pressure numbers are.  Instructed patient is very important to measure blood pressure at home so we can adjust medication as needed.  Elevated blood pressure can cause kidney damage, heart attack, stroke and other health issues and is very important to comply with current regiment.  Discussed with patient to continue Diovan 80 mg p.o. once daily, will add a 2nd medication Norvasc 10 mg p.o. once daily, medications maybe taken with food or without food.  continue taking aspirin 81 mg p.o. Discussed with patient to monitor her salt intake below 3 g a day, follow low-cholesterol, low-fat diet.  Exercise at least 30 minutes a day up to 5 days a week a simple as brisk walking, eat fresh vegetables and fruit and to keep in mind serving size regarding fruits consumption due to diabetes.  Patient to read discharge education material, Questions solicited and answered, patient verbalized.     Diabetes: 2023 hemoglobin A1c 13.2% with estimated average glucose of 332.1.  From previous visit patient had issues with metformin due to GI disturbances and was Instructed to start with 1 tablet  broken into half and to be taken twice a day and reassess GI disturbance.  Patient today state she is tolerating 500 mg p.o. once daily, instructed patient to start taking medication twice a day .  Patient has not been monitoring her blood glucose at home because she does not  want to know her numbers.  Discussed with patient to continue to follow diabetic diet meal planning, exercising, lifestyle modification, calorie control.  Hemoglobin A1c drawn today will notify patient of test results when they become available and adjust medication if needed .  Patient verbalized understanding and agreed to plan of care.     05/04/2023 hemoglobin A1c 12.3 estimated average blood glucose 306.  RX Jardiance 10 mg p.o. once daily has been sent to her preferred pharmacy.    Bilateral knee pain:  Patient has history of osteoarthritis, previous knee x-rays reviewed at bedside.  Discussed with patient she can take Tylenol Arthritis 650 mg p.o. 1-2 tab every 8 hours and not to exceed more than 6 tablets a day also she needs to elevate ice rest.  Also discussed exercises to help stabilize her knees such as leg raises or extensions, hamstring curls, calf raises.  Also discussed referral to orthopedic clinic to discuss injections possibly to discuss knee replacement if she qualifies.  Also discussed Rx Voltaren 2 g to be applied 4 times a day as needed for pain and has been sent to her preferred pharmacy.  Stay hydrated with water.  Patient to read discharge education material, questions solicited and answered, patient verbalized and agreed to plan of care.     Patient denies chest pain, shortness of breath, dyspnea on exertion, palpitations, peripheral edema, abdominal pain, nausea, vomiting, diarrhea, constipation, fatigue, fever, chills, dysuria,  hematuria,  or hematochezia. HPI      HPI      ALLERGIES:   Review of patient's allergies indicates:   Allergen Reactions    Latex Itching         ROS:  Review of Systems   Musculoskeletal:  Positive for joint pain (Chronic bilateral knee pain).   All other systems reviewed and are negative.      OBJECTIVE DATA:  Vital signs  Vitals:    05/04/23 1244   BP: (!) 194/114   Resp: 18   Temp: 97.8 °F (36.6 °C)   TempSrc: Oral   SpO2: 100%   Weight: 74.4 kg (164 lb)  "  Height: 5' 1" (1.549 m)      Body mass index is 30.99 kg/m².    PHYSICAL EXAM:   Physical Exam  Vitals and nursing note reviewed.   Constitutional:       General: She is awake. She is not in acute distress.     Appearance: Normal appearance. She is well-developed, well-groomed and overweight. She is not ill-appearing, toxic-appearing or diaphoretic.   HENT:      Head: Normocephalic and atraumatic.      Right Ear: Tympanic membrane, ear canal and external ear normal.      Left Ear: Tympanic membrane, ear canal and external ear normal.      Nose: Nose normal.      Mouth/Throat:      Lips: Pink.      Mouth: Mucous membranes are moist.      Pharynx: Oropharynx is clear. Uvula midline.   Eyes:      General: Lids are normal.      Extraocular Movements: Extraocular movements intact.      Conjunctiva/sclera: Conjunctivae normal.      Pupils: Pupils are equal, round, and reactive to light.   Neck:      Trachea: Trachea and phonation normal.   Cardiovascular:      Rate and Rhythm: Normal rate and regular rhythm.      Pulses: Normal pulses.           Radial pulses are 2+ on the right side and 2+ on the left side.        Dorsalis pedis pulses are 2+ on the right side and 2+ on the left side.        Posterior tibial pulses are 2+ on the right side and 2+ on the left side.      Heart sounds: Normal heart sounds. No murmur heard.  Pulmonary:      Effort: Pulmonary effort is normal.      Breath sounds: Normal breath sounds and air entry. No wheezing or rhonchi.   Abdominal:      Palpations: Abdomen is soft.      Tenderness: There is no abdominal tenderness. There is no right CVA tenderness or left CVA tenderness.   Musculoskeletal:         General: Normal range of motion.      Cervical back: Normal range of motion and neck supple. No rigidity or tenderness.      Right lower leg: No edema.      Left lower leg: No edema.        Legs:       Comments: Bilateral knee joint tenderness with palpation, patient unable to extend fully due " to stiffness and pain, anterior drawer test negative.  Ambulating without assistant.   Lymphadenopathy:      Cervical: No cervical adenopathy.   Skin:     General: Skin is warm and dry.      Capillary Refill: Capillary refill takes less than 2 seconds.      Findings: No rash.   Neurological:      General: No focal deficit present.      Mental Status: She is alert and oriented to person, place, and time. Mental status is at baseline.      GCS: GCS eye subscore is 4. GCS verbal subscore is 5. GCS motor subscore is 6.      Cranial Nerves: Cranial nerves 2-12 are intact.      Sensory: Sensation is intact.      Motor: Motor function is intact.      Coordination: Coordination is intact.      Gait: Gait is intact. Gait normal.   Psychiatric:         Attention and Perception: Attention normal.         Mood and Affect: Mood and affect normal.         Speech: Speech normal.         Behavior: Behavior normal. Behavior is cooperative.         Thought Content: Thought content normal.         Cognition and Memory: Cognition and memory normal.         Judgment: Judgment normal.        ASSESSMENT/PLAN:  1. Type 2 diabetes mellitus with hyperglycemia, without long-term current use of insulin  Assessment & Plan:  instructed patient to start taking medication metformin 500 mg p.o. twice a day .  Patient has not been monitoring her blood glucose at home because she does not want to know her numbers.  Discussed with patient to continue to follow diabetic diet meal planning, exercising, lifestyle modification, calorie control.  Hemoglobin A1c drawn today will notify patient of test results when they become available and adjust medication if needed .  Patient verbalized understanding and agreed to plan of care.     Orders:  -     Hemoglobin A1C  -     empagliflozin (JARDIANCE) 10 mg tablet; Take 1 tablet (10 mg total) by mouth once daily.  Dispense: 90 tablet; Refill: 1    2. Primary hypertension  Assessment & Plan:  Discussed with  patient to continue Diovan 80 mg p.o. once daily, will add a 2nd medication Norvasc 10 mg p.o. once daily, medications maybe taken with food or without food.  continue taking aspirin 81 mg p.o. Discussed with patient to monitor her salt intake below 3 g a day, follow low-cholesterol, low-fat diet.  Exercise at least 30 minutes a day up to 5 days a week a simple as brisk walking, eat fresh vegetables and fruit and to keep in mind serving size regarding fruits consumption due to diabetes.  Patient to read discharge education material, Questions solicited and answered, patient verbalized.    Orders:  -     valsartan (DIOVAN) 80 MG tablet; Take 1 tablet (80 mg total) by mouth once daily.  Dispense: 90 tablet; Refill: 3  -     amLODIPine (NORVASC) 10 MG tablet; Take 1 tablet (10 mg total) by mouth once daily.  Dispense: 90 tablet; Refill: 3    3. Bilateral chronic knee pain  -     Ambulatory referral/consult to Orthopedics; Future; Expected date: 05/11/2023  -     Discontinue: diclofenac sodium (VOLTAREN) 1 % Gel; Apply topically 4 (four) times daily. apply to affected area  Dispense: 300 g; Refill: 3  -     diclofenac sodium (VOLTAREN) 1 % Gel; Apply topically 4 (four) times daily. apply to affected area  Dispense: 300 g; Refill: 3    4. Primary osteoarthritis of both knees  -     Discontinue: diclofenac sodium (VOLTAREN) 1 % Gel; Apply topically 4 (four) times daily. apply to affected area  Dispense: 300 g; Refill: 3  -     diclofenac sodium (VOLTAREN) 1 % Gel; Apply topically 4 (four) times daily. apply to affected area  Dispense: 300 g; Refill: 3    5. Encounter for colorectal cancer screening  -     Cologuard Screening (Multitarget Stool DNA); Future; Expected date: 05/04/2023           RESULTS:  Recent Results (from the past 1008 hour(s))   Hemoglobin A1C    Collection Time: 05/04/23  2:09 PM   Result Value Ref Range    Hemoglobin A1c 12.3 (H) <=7.0 %    Estimated Average Glucose 306.3 mg/dL         Follow  Up:  Follow up in about 3 months (around 8/4/2023).      Previous medical history/lab work/radiology reviewed and considered during medical management decisions.   Medication list reviewed and medication reconciliation performed.  Patient was provided  and care about his/her current diagnosis (es) and medications including risk/benefit and side effects/adverse events, over the counter medication uses/doses, home self-care and contact precautions,  and red flags and indications for when to seek immediate medical attention.   Patient was advised to continue compliance with current medication list and medical recommendations.  Patient dvised continued compliance with recommended eating habits/ diets for medical conditions and exercise 150 minutes/ week (if possible) for medical condition (s).  Educational handouts and instructions on selected disease management in AVS (After Visit Summary).    All of the patient's questions were answered to patient's satisfaction.   The patient was receptive, expressed verbal understanding and agreement the above plan.         This note was created with the assistance of a voice recognition software or phone dictation. There may be transcription errors as a result of using this technology however minimal. Effort has been made to assure accuracy of transcription but any obvious errors or omissions should be clarified with the author of the document

## 2023-05-04 NOTE — PROGRESS NOTES
Try to call patient unable to leave voicemail because mailbox is full.  Please notify patient her hemoglobin A1c continues to be critical at 12.3% with estimated average blood glucose at 306 .  Please remind patient to start taking her metformin 500 mg p.o. twice a day and a new prescription of Jardiance 10 mg p.o. has been sent to her preferred pharmacy and to start take it once a day.  Remind patient to return in 3 months her follow-up on diabetes as well to check on her lipids.

## 2023-05-04 NOTE — ASSESSMENT & PLAN NOTE
Discussed with patient to continue Diovan 80 mg p.o. once daily, will add a 2nd medication Norvasc 10 mg p.o. once daily, medications maybe taken with food or without food.  continue taking aspirin 81 mg p.o. Discussed with patient to monitor her salt intake below 3 g a day, follow low-cholesterol, low-fat diet.  Exercise at least 30 minutes a day up to 5 days a week a simple as brisk walking, eat fresh vegetables and fruit and to keep in mind serving size regarding fruits consumption due to diabetes.  Patient to read discharge education material, Questions solicited and answered, patient verbalized.

## 2023-05-04 NOTE — ASSESSMENT & PLAN NOTE
instructed patient to start taking medication metformin 500 mg p.o. twice a day .  Patient has not been monitoring her blood glucose at home because she does not want to know her numbers.  Discussed with patient to continue to follow diabetic diet meal planning, exercising, lifestyle modification, calorie control.  Hemoglobin A1c drawn today will notify patient of test results when they become available and adjust medication if needed .  Patient verbalized understanding and agreed to plan of care.

## 2023-05-05 ENCOUNTER — TELEPHONE (OUTPATIENT)
Dept: FAMILY MEDICINE | Facility: CLINIC | Age: 63
End: 2023-05-05

## 2023-05-05 NOTE — TELEPHONE ENCOUNTER
----- Message from WALLY Morrow sent at 5/4/2023  4:04 PM CDT -----  Try to call patient unable to leave voicemail because mailbox is full.  Please notify patient her hemoglobin A1c continues to be critical at 12.3% with estimated average blood glucose at 306 .  Please remind patient to start taking her metformin 500 mg p.o. twice a day and a new prescription of Jardiance 10 mg p.o. has been sent to her preferred pharmacy and to start take it once a day.  Remind patient to return in 3 months her follow-up on diabetes as well to check on her lipids.

## 2023-05-05 NOTE — TELEPHONE ENCOUNTER
----- Message from Barbara Peres sent at 5/5/2023  9:43 AM CDT -----  Regarding: Call back  General Phone Message    Caller is:  (x ) Patient  (  ) Family  (  ) Pharmacy    (  ) Home Health  (  ) Other     Provider: WALLY Meléndez    Last Visit:    Next Visit: 8/7/2023    Reason for Call: Patient states she missed your call yesterday. She is asking if you could call her back.                   Best Time to Contact:    Preferred Phone Number:

## 2023-05-18 ENCOUNTER — PATIENT MESSAGE (OUTPATIENT)
Dept: ADMINISTRATIVE | Facility: HOSPITAL | Age: 63
End: 2023-05-18

## 2023-07-11 ENCOUNTER — OFFICE VISIT (OUTPATIENT)
Dept: ORTHOPEDICS | Facility: CLINIC | Age: 63
End: 2023-07-11
Payer: MEDICAID

## 2023-07-11 ENCOUNTER — HOSPITAL ENCOUNTER (OUTPATIENT)
Dept: RADIOLOGY | Facility: HOSPITAL | Age: 63
Discharge: HOME | End: 2023-07-11
Attending: NURSE PRACTITIONER
Payer: MEDICAID

## 2023-07-11 VITALS
DIASTOLIC BLOOD PRESSURE: 92 MMHG | BODY MASS INDEX: 31.03 KG/M2 | OXYGEN SATURATION: 100 % | HEART RATE: 92 BPM | HEIGHT: 61 IN | SYSTOLIC BLOOD PRESSURE: 190 MMHG | RESPIRATION RATE: 18 BRPM | WEIGHT: 164.38 LBS

## 2023-07-11 DIAGNOSIS — G89.29 CHRONIC PAIN OF LEFT KNEE: ICD-10-CM

## 2023-07-11 DIAGNOSIS — M25.561 CHRONIC PAIN OF RIGHT KNEE: ICD-10-CM

## 2023-07-11 DIAGNOSIS — M17.0 PRIMARY OSTEOARTHRITIS OF BOTH KNEES: Primary | ICD-10-CM

## 2023-07-11 DIAGNOSIS — M25.562 CHRONIC PAIN OF LEFT KNEE: ICD-10-CM

## 2023-07-11 DIAGNOSIS — G89.29 CHRONIC PAIN OF RIGHT KNEE: ICD-10-CM

## 2023-07-11 PROCEDURE — 99215 OFFICE O/P EST HI 40 MIN: CPT | Mod: PBBFAC | Performed by: NURSE PRACTITIONER

## 2023-07-11 PROCEDURE — 99214 OFFICE O/P EST MOD 30 MIN: CPT | Mod: S$PBB,,, | Performed by: NURSE PRACTITIONER

## 2023-07-11 PROCEDURE — 73564 X-RAY EXAM KNEE 4 OR MORE: CPT | Mod: TC,RT

## 2023-07-11 PROCEDURE — 99214 PR OFFICE/OUTPT VISIT, EST, LEVL IV, 30-39 MIN: ICD-10-PCS | Mod: S$PBB,,, | Performed by: NURSE PRACTITIONER

## 2023-07-11 PROCEDURE — 73564 X-RAY EXAM KNEE 4 OR MORE: CPT | Mod: TC,LT

## 2023-07-11 NOTE — PROGRESS NOTES
"  Subjective:   PATIENT ID: Paola Cm is a 62 y.o. female. Non-smoker. Employment HX: , currently retired.    Seen [facility] ortho for same DX since n/a.   CHIEF COMPLAINT: Pain of the Right Knee and Pain of the Left Knee    HPI:    Bilateral L < R aching medial knee pain.   Injury: no known injury  Onset: several years ago fluctuates   Modifying Factors: worse with activity, improves with rest, stiffness after immobilization, and improves with less than 30 minutes activity  Associated Symptoms: crepitus, decreased ROM, and "giving out"   Activity: sedentary with light activity and pain moderately interferes with ADLs   Previous Treatments:  BMI reduction ongoing education, HEP withTheraBand, RX NSAIDs, and CSI since years ago last injection years ago  with some relief but symptoms returning  PMH: + DM   Family History: + OA    NOTE: New patient referred for bilateral knee pain with limited conservative treatments.  Symptoms affecting ADLs and ability to work.   Patient in process of applying for disability and Medicaid, today self pay.     Current Outpatient Medications:     amLODIPine (NORVASC) 10 MG tablet, Take 1 tablet (10 mg total) by mouth once daily. (Patient not taking: Reported on 7/11/2023), Disp: 90 tablet, Rfl: 3    diclofenac sodium (VOLTAREN) 1 % Gel, Apply topically 4 (four) times daily. apply to affected area (Patient not taking: Reported on 7/11/2023), Disp: 300 g, Rfl: 3    empagliflozin (JARDIANCE) 10 mg tablet, Take 1 tablet (10 mg total) by mouth once daily. (Patient not taking: Reported on 7/11/2023), Disp: 90 tablet, Rfl: 1    metFORMIN (GLUCOPHAGE) 500 MG tablet, Take 1 tablet (500 mg total) by mouth 2 (two) times daily with meals. (Patient not taking: Reported on 7/11/2023), Disp: 180 tablet, Rfl: 3    prednisoLONE acetate (PRED FORTE) 1 % DrpS, Place 1 drop into the right eye 4 (four) times daily. (Patient not taking: Reported on 7/11/2023), Disp: 10 mL, Rfl: 0    TRUE METRIX " "GLUCOSE TEST STRIP Strp, USE TO CHECK BLOOD SUGARS DAILY, Disp: , Rfl:     valsartan (DIOVAN) 80 MG tablet, Take 1 tablet (80 mg total) by mouth once daily. (Patient not taking: Reported on 7/11/2023), Disp: 90 tablet, Rfl: 3  Review of patient's allergies indicates:   Allergen Reactions    Latex Itching     Hemoglobin A1c   Date Value Ref Range Status   05/04/2023 12.3 (H) <=7.0 % Final   02/23/2023 13.2 (H) <=7.0 % Final   07/08/2022 10.9 (H) <=7.0 % Final      Body mass index is 31.06 kg/m².   Vitals:    07/11/23 1250 07/11/23 1336   BP: (!) 192/112 (!) 190/92   Pulse: 92    Resp: 18    SpO2: 100%    Weight: 74.6 kg (164 lb 6.4 oz)    Height: 5' 1" (1.549 m)    PainSc:   7       REVIEW OF SYSTEMS:  A ten-point review of systems was performed and is negative, except as mentioned above   Objective:   MSK Bilateral Knee  General:  No apparent distress, no pain indicators, obesity   Inspection: limping gait, mild effusion, full weight bearing, no erythema, no contusion, mild quad deconditioning, varus deformity   Palpation: BILATERAL knees tenderness with palpation at medial joint line, peripatellar soft tissue, non-tender: patellar tendon, tibial plateau  ROM:    Active Flexion / Extension (0-140):  0 / 116 painful (R)  1 / 118 painful (L)  Strength:   Flexion     4 / 5 (R)  4 / 5 (L)  Extension     4 / 5 (R)  4 / 5 (L)  Special Testing:  IT Band Testing  Praveena's Test:  -- (R)  -- (L)  Effusion Testing  Ballotable Effusion:   -- (R)  -- (L)  Fluid Wave:    -- (R)  -- (L)  Patellar Testing  Patellar Grind:    + (R)  + (L)  Patellar Facet Pain:   + (R)  + (L)  Apprehension:    -- (R)  -- (L)  Meniscal Testing  Steph's test:    + (R)  + (L)  Thessaly's Test:    Not Tested (R)  Not Tested (L)  Ligament Testing  ACL Anterior Drawer:   -- (R) -- (L)  PCL Posterior Drawer:   -- (R) -- (L)  LCL Varus Test:    -- (R) -- (L)  MCL Valgus Test:    -- (R) -- (L)  Hip Exam normal  Ankle Exam normal  Neurovascular: Intact to " light touch  Neuro/ Psych: Awake, alert, oriented, normal mood and affect  Lymphatic: No LAD  Skin/ Soft Tissue: no rash, skin intact  Assessment:   IMAGING: X-ray 4 views of right and left knee ordered, reviewed and independently interpreted by me. Discussed with patient. Noted no acute findings, DJD noted, awaiting radiologist findings    EMR REVIEW: completed with noted Referral documentation reviewed    DIAGNOSIS:  1. Primary osteoarthritis of both knees    2. BMI 31.0-31.9,adult       Plan:     Orders Placed This Encounter    X-Ray Knee Complete 4 Or More Views Right    X-Ray Knee Complete 4 or More Views Left     Ongoing education about DX and treatment recommendations including conservative treatments of daily HEP with TheraBand, BMI reduction goal 5-10% of body weight (140# overall goal for BMI <40), muscle strengthening with use of stationary bike (RPM set at 80 or > with slow progression to goal of 40 minutes 3-4 times per week as tolerated), adequate vit D/C, glucosamine 1500 mg/day and daily acetaminophen 1000 mg 3 times/ day if able to tolerate.    Treatment plan: Moderate exacerbation of chronic Bilateral R = L End stage knee arthritis Recommend starting initial conservative treatments including: RX topical NSAID  and HEP with TheraBand > 6 weeks.  If inadequate at f/u will consider CSI.   Procedure: CSI v. VS injections discussed as treatment options in future if conservative treatments fail.   CSI offered today but patient declines s/t finances   RX Medications: continue medications as RX per PCP.  RTC: PRN if symptoms worsen or return  once insurance obtained patient will move forward.   NOTE: Conversation had with patient discussing surgical versus conservative treatment options.  At this time patient declines referral to surgeon for further eval. and surgical treatment options.  If status changes patient will up date me.        Leah Menard-Neumann FNP [facility]  [facility]  Procedure Note:   none    Time Based Billing     N/a

## 2023-10-10 ENCOUNTER — OFFICE VISIT (OUTPATIENT)
Dept: FAMILY MEDICINE | Facility: CLINIC | Age: 63
End: 2023-10-10
Payer: MEDICAID

## 2023-10-10 VITALS
WEIGHT: 161 LBS | HEIGHT: 61 IN | SYSTOLIC BLOOD PRESSURE: 207 MMHG | RESPIRATION RATE: 18 BRPM | DIASTOLIC BLOOD PRESSURE: 117 MMHG | TEMPERATURE: 98 F | HEART RATE: 89 BPM | BODY MASS INDEX: 30.4 KG/M2 | OXYGEN SATURATION: 98 %

## 2023-10-10 DIAGNOSIS — Z13.9 ENCOUNTER FOR SCREENING INVOLVING SOCIAL DETERMINANTS OF HEALTH (SDOH): ICD-10-CM

## 2023-10-10 DIAGNOSIS — E11.65 TYPE 2 DIABETES MELLITUS WITH HYPERGLYCEMIA, WITHOUT LONG-TERM CURRENT USE OF INSULIN: Primary | ICD-10-CM

## 2023-10-10 DIAGNOSIS — I10 PRIMARY HYPERTENSION: ICD-10-CM

## 2023-10-10 DIAGNOSIS — Z91.199 PERSONAL HISTORY OF NONCOMPLIANCE WITH MEDICAL TREATMENT, PRESENTING HAZARDS TO HEALTH: ICD-10-CM

## 2023-10-10 LAB
ALBUMIN SERPL-MCNC: 3.6 G/DL (ref 3.4–4.8)
ALBUMIN/GLOB SERPL: 0.8 RATIO (ref 1.1–2)
ALP SERPL-CCNC: 166 UNIT/L (ref 40–150)
ALT SERPL-CCNC: 16 UNIT/L (ref 0–55)
AST SERPL-CCNC: 16 UNIT/L (ref 5–34)
BILIRUB SERPL-MCNC: 0.5 MG/DL
BUN SERPL-MCNC: 13.9 MG/DL (ref 9.8–20.1)
CALCIUM SERPL-MCNC: 10 MG/DL (ref 8.4–10.2)
CHLORIDE SERPL-SCNC: 101 MMOL/L (ref 98–107)
CO2 SERPL-SCNC: 29 MMOL/L (ref 23–31)
CREAT SERPL-MCNC: 1.09 MG/DL (ref 0.55–1.02)
EST. AVERAGE GLUCOSE BLD GHB EST-MCNC: 326.4 MG/DL
GFR SERPLBLD CREATININE-BSD FMLA CKD-EPI: 58 MLS/MIN/1.73/M2
GLOBULIN SER-MCNC: 4.8 GM/DL (ref 2.4–3.5)
GLUCOSE SERPL-MCNC: 340 MG/DL (ref 82–115)
HBA1C MFR BLD: 13 %
POTASSIUM SERPL-SCNC: 4.3 MMOL/L (ref 3.5–5.1)
PROT SERPL-MCNC: 8.4 GM/DL (ref 5.8–7.6)
SODIUM SERPL-SCNC: 138 MMOL/L (ref 136–145)

## 2023-10-10 PROCEDURE — 36415 COLL VENOUS BLD VENIPUNCTURE: CPT | Performed by: NURSE PRACTITIONER

## 2023-10-10 PROCEDURE — 99214 PR OFFICE/OUTPT VISIT, EST, LEVL IV, 30-39 MIN: ICD-10-PCS | Mod: S$PBB,,, | Performed by: NURSE PRACTITIONER

## 2023-10-10 PROCEDURE — 83036 HEMOGLOBIN GLYCOSYLATED A1C: CPT | Performed by: NURSE PRACTITIONER

## 2023-10-10 PROCEDURE — 99214 OFFICE O/P EST MOD 30 MIN: CPT | Mod: S$PBB,,, | Performed by: NURSE PRACTITIONER

## 2023-10-10 PROCEDURE — 80053 COMPREHEN METABOLIC PANEL: CPT | Performed by: NURSE PRACTITIONER

## 2023-10-10 PROCEDURE — 99215 OFFICE O/P EST HI 40 MIN: CPT | Mod: PBBFAC | Performed by: NURSE PRACTITIONER

## 2023-10-10 RX ORDER — METFORMIN HYDROCHLORIDE 500 MG/1
500 TABLET ORAL 2 TIMES DAILY WITH MEALS
Qty: 180 TABLET | Refills: 3 | Status: SHIPPED | OUTPATIENT
Start: 2023-10-10 | End: 2024-02-02 | Stop reason: SDUPTHER

## 2023-10-10 RX ORDER — HYDROCODONE BITARTRATE AND ACETAMINOPHEN 7.5; 325 MG/1; MG/1
1 TABLET ORAL EVERY 6 HOURS PRN
COMMUNITY
Start: 2023-07-17 | End: 2023-11-08

## 2023-10-10 RX ORDER — TROPICAMIDE 10 MG/ML
1 SOLUTION/ DROPS OPHTHALMIC
COMMUNITY
Start: 2023-07-25 | End: 2023-11-08

## 2023-10-10 RX ORDER — VALSARTAN 80 MG/1
80 TABLET ORAL DAILY
Qty: 90 TABLET | Refills: 3 | Status: SHIPPED | OUTPATIENT
Start: 2023-10-10 | End: 2023-11-08

## 2023-10-10 RX ORDER — PHENYLEPHRINE HYDROCHLORIDE 25 MG/ML
1 SOLUTION/ DROPS OPHTHALMIC
COMMUNITY
Start: 2023-07-25 | End: 2023-11-08

## 2023-10-10 RX ORDER — PROPARACAINE HYDROCHLORIDE 5 MG/ML
1 SOLUTION/ DROPS OPHTHALMIC
COMMUNITY
Start: 2023-07-25 | End: 2023-11-08

## 2023-10-10 NOTE — ASSESSMENT & PLAN NOTE
05/04/2023 hemoglobin A1c 12.3%.  Patient currently noncompliant with medications.  Patient continued to argue medications do not work and they cause more harm than helping.  Patient state currently she is trying to manage her dietary habits and lifestyle.  Patient is involve in water aerobics and she does exercise at least 4-5 days a week.  Patient state she hard  that will manage dietary intake.  Discussed with patient the importance of taking medication to prevent complications such as blindness, kidney injury, cardiovascular etcetera.  Patient continue to state Medicaid and Medicare does not want approve that she is disable.  Discussed with patient she needs to go to [facility] for help.  Patient state she has been there but does not want to comment about the outcome.  Patient agreed to restart medications and repeat hemoglobin A1c.  Patient to read discharge education materials.  Questions solicited and answered, patient verbalized.

## 2023-10-10 NOTE — PATIENT INSTRUCTIONS
Curtis Guevara,     If you are due for any health screening(s) below please notify me so we can arrange them to be ordered and scheduled. Most healthy patients at your age complete them, but you are free to accept or refuse.     If you can't do it, I'll definitely understand. If you can, I'd certainly appreciate it!    Tests to Keep You Healthy    Mammogram: ORDERED BUT NOT SCHEDULED  Eye Exam: Met on 2/1/2023  Colon Cancer Screening: ORDERED  Last Blood Pressure <= 139/89 (10/10/2023): NO  Last HbA1c < 8 (05/04/2023): NO      Schedule your breast cancer screening today     Breast cancer is the second most common cancer in women,  and the second leading cause of death from cancer. Mammograms can detect breast cancer early, which significantly increases the chances of curing the cancer.       Our records indicate that you may be overdue for breast cancer screening. Cancer screenings save lives, so schedule yours today to stay healthy.     If you recently had a mammogram performed outside of [facility], please let your Health care team know so that they can update your health record.        Its time for your colon cancer screening     Colorectal cancer is one of the leading causes of cancer death for men and women but it doesnt have to be. Screenings can prevent colorectal cancer or find it early enough to treat and cure the disease.     Our records indicate that you may be overdue for colon cancer screening. A colonoscopy or stool screening test can help identify patients at risk for developing colon cancer. Cancer screenings save lives, so schedule yours today to stay healthy.     A colonoscopy is the preferred test for detecting colon cancer. It is needed only once every 10 years if results are negative. While you are sedated, a flexible, lighted tube with a tiny camera is inserted into the rectum and advanced through the colon to look for cancers.     An alternative screening test that is used at home and  returned to the lab may also be used. It detects hidden blood in bowel movements which could indicate cancer in the colon. If results are positive, you will need a colonoscopy to determine if the blood is a sign of cancer. This type of follow up (diagnostic) colonoscopy usually requires additional copays as required by your insurance provider.     If you recently had your colon cancer screening performed outside of [facility], please let your Health care team know so that they can update your health record. Please contact your PCP if you have any questions.    Lets manage your high blood pressure     Your blood pressure was above 140/90 today during your visit. We recommend that you schedule a nurse visit in two weeks to check your blood pressure and discuss ways to support your health goals.     You can also manage your health and record your blood pressure from the comfort of home by keeping a daily blood pressure log. These results are shared with and reviewed by your provider. Please print this form (Daily Blood Pressure Log) to assist you in keeping track of your blood pressure at home.     Schedule your nurse visit in two weeks to learn more about how to track and manage high blood pressure.    Daily Blood Pressure Log    Name:__________________________________                  Date of Birth:_________    Average Blood Pressure:  __________      Date: Time  (a.m.) Blood  Pressure: Pulse  Rate: Time  (p.m.) Blood  Pressure : Pulse  Rate:   Sample 8:37 127/83 84                                                                                                                                                                                   Lets manage your A1c levels     Your last hemoglobin A1c was higher than the goal of less than 8. Hemoglobin A1c or HbA1c is a blood test that measures your average blood sugar levels over the past 3 months. It is the main test to help you and your health care team  manage your diabetes.     Higher A1c levels are linked to diabetes complications, such as loss of vision, kidney disease, and nerve damage. Keeping your A1c at least less than 8 is important to stay healthy and we are here to help. Talk with your provider on how you can further improve your A1c.     We recommend that you set up blood work to get a repeat hemoglobin A1c in 3 months to monitor your diabetes. Let your health care team know if you have questions.

## 2023-10-10 NOTE — PROGRESS NOTES
Patient Name: Paola Cm   : 1960  MRN: [id number]     SUBJECTIVE DATA:    CHIEF COMPLAINT:   Paola Cm is a 62 y.o. female who presents to clinic today with Diabetes and Hypertension        HPI:  62-year-old female presents to the clinic to follow-up on diabetes and hypertension.      Diabetes: 2023 hemoglobin A1c 12.3%.  Patient currently noncompliant with medications.  Patient continued to argue medications do not work and they cause more harm than helping.  Patient state currently she is trying to manage her dietary habits and lifestyle.  Patient is involve in water aerobics and she does exercise at least 4-5 days a week.  Patient state she hard  that will manage dietary intake.  Discussed with patient the importance of taking medication to prevent complications such as blindness, kidney injury, cardiovascular etcetera.  Patient continue to state Medicaid and Medicare does not want approve that she is disable.  Discussed with patient she needs to go to [place] [id number] for help.  Patient state she has been there but does not want to comment about the outcome.  Patient agreed to restart medications and repeat hemoglobin A1c.  Rx metformin 500 mg p.o. b.i.d. sent to preferred pharmacy, Rx Jardiance 10 mg p.o. once daily to be taken with breakfast. Patient to read discharge education materials.  Questions solicited and answered, patient verbalized.    10/20/2023 addendum:  Patient requesting referral to diabetic educator.  Done    Hypertension:  Uncontrolled.  Initial blood pressure 206/121, repeated prior to discharge 207/117.  Patient state she stopped taking her because they do not help.  Patient had missed multiple appointment for re-evaluation of medication efficacy.  Discussed with patient not taking her medication can cause cardiovascular complications.  Patient state she will try again and will be more compliant.  Rx Diovan 80 mg sent to her preferred pharmacy and to take 1st dose today.   "Patient declines to restart Norvasc 10 mg.  Patient to return for blood pressure check and if needed will add 2nd medication.  Continue with dietary habit modifications.  Follow low-salt diet less than 2 g per day.  Stay physically active and lose weight.  Questions solicited and answered, patient to read discharge education materials and to return in 4 weeks for blood pressure check.  CMP pending. Patient verbalized and agreed to plan.    Care gaps:  Declines mammogram.  Declines immunizations.  Declined colorectal cancer screening.  Case management counseling referral initiated to help patient find resources.    Patient denies chest pain, shortness of breath, dyspnea on exertion, palpitations, peripheral edema, abdominal pain, nausea, vomiting, diarrhea, constipation, fatigue, fever, chills, dysuria,  hematuria, melena, or hematochezia.       ALLERGIES:   Review of patient's allergies indicates:   Allergen Reactions    Latex Itching         ROS:  Review of Systems   All other systems reviewed and are negative.        OBJECTIVE DATA:  Vital signs  Vitals:    10/10/23 1322 10/10/23 1404   BP: (!) 206/121 (!) 207/117   Pulse: 89    Resp: 18    Temp: 98 °F (36.7 °C)    TempSrc: Oral    SpO2: 98%    Weight: 73 kg (161 lb)    Height: 5' 1" (1.549 m)       Body mass index is 30.42 kg/m².    PHYSICAL EXAM:   Physical Exam  Vitals and nursing note reviewed.   Constitutional:       General: She is awake. She is not in acute distress.     Appearance: Normal appearance. She is well-developed, well-groomed and overweight. She is not ill-appearing, toxic-appearing or diaphoretic.   HENT:      Head: Normocephalic and atraumatic.      Right Ear: Tympanic membrane, ear canal and external ear normal.      Left Ear: Tympanic membrane, ear canal and external ear normal.      Nose: Nose normal.      Mouth/Throat:      Mouth: Mucous membranes are moist.      Dentition: Dental caries present.      Pharynx: Oropharynx is clear. Uvula " midline.      Comments: Patient has a dentist.  Patient to call and schedule.  Eyes:      General: Lids are normal.      Extraocular Movements: Extraocular movements intact.      Conjunctiva/sclera: Conjunctivae normal.      Pupils: Pupils are equal, round, and reactive to light.   Neck:      Trachea: Trachea and phonation normal.   Cardiovascular:      Rate and Rhythm: Normal rate and regular rhythm.      Pulses: Normal pulses.           Radial pulses are 2+ on the right side and 2+ on the left side.      Heart sounds: Normal heart sounds. No murmur heard.  Pulmonary:      Effort: Pulmonary effort is normal.      Breath sounds: Normal breath sounds and air entry. No wheezing or rhonchi.   Abdominal:      Palpations: Abdomen is soft.   Musculoskeletal:         General: Normal range of motion.      Cervical back: Normal range of motion and neck supple.      Right lower leg: No edema.      Left lower leg: No edema.   Skin:     General: Skin is warm and dry.      Capillary Refill: Capillary refill takes less than 2 seconds.   Neurological:      General: No focal deficit present.      Mental Status: She is alert and oriented to person, place, and time. Mental status is at baseline.      GCS: GCS eye subscore is 4. GCS verbal subscore is 5. GCS motor subscore is 6.   Psychiatric:         Attention and Perception: Attention and perception normal.         Mood and Affect: Mood and affect normal.         Speech: Speech normal.         Behavior: Behavior normal. Behavior is cooperative.         Thought Content: Thought content normal.         Cognition and Memory: Cognition and memory normal.         Judgment: Judgment normal.          ASSESSMENT/PLAN:  1. Type 2 diabetes mellitus with hyperglycemia, without long-term current use of insulin  Assessment & Plan:  05/04/2023 hemoglobin A1c 12.3%.  Patient currently noncompliant with medications.  Patient continued to argue medications do not work and they cause more harm than  helping.  Patient state currently she is trying to manage her dietary habits and lifestyle.  Patient is involve in water aerobics and she does exercise at least 4-5 days a week.  Patient state she hard  that will manage dietary intake.  Discussed with patient the importance of taking medication to prevent complications such as blindness, kidney injury, cardiovascular etcetera.  Patient continue to state Medicaid and Medicare does not want approve that she is disable.  Discussed with patient she needs to go to [place] [id number] for help.  Patient state she has been there but does not want to comment about the outcome.  Patient agreed to restart medications and repeat hemoglobin A1c.  Patient to read discharge education materials.  Questions solicited and answered, patient verbalized.    Orders:  -     Hemoglobin A1C  -     metFORMIN (GLUCOPHAGE) 500 MG tablet; Take 1 tablet (500 mg total) by mouth 2 (two) times daily with meals.  Dispense: 180 tablet; Refill: 3  -     empagliflozin (JARDIANCE) 10 mg tablet; Take 1 tablet (10 mg total) by mouth once daily.  Dispense: 90 tablet; Refill: 3  -     Ambulatory referral/consult to Diabetes Education; Future; Expected date: 10/27/2023    2. Primary hypertension  Assessment & Plan:  Uncontrolled.  Initial blood pressure 206/121, repeated prior to discharge 207/117.  Patient state she stopped taking her because they do not help.  Patient had missed multiple appointment for re-evaluation of medication efficacy.  Discussed with patient not taking her medication can cause cardiovascular complications.  Patient state she will try again and will be more compliant.  Rx Diovan 80 mg sent to her preferred pharmacy and to take 1st dose today.  Patient declines to restart Norvasc 10 mg.  Patient to return for blood pressure check and if needed will add 2nd medication.  Continue with dietary habit modifications.  Follow low-salt diet less than 2 g per day.  Stay physically active and lose  weight.  Questions solicited and answered, patient to read discharge education materials and to return in 4 weeks for blood pressure check.  CMP pending. Patient verbalized and agreed to plan.    Orders:  -     valsartan (DIOVAN) 80 MG tablet; Take 1 tablet (80 mg total) by mouth once daily.  Dispense: 90 tablet; Refill: 3  -     Comprehensive Metabolic Panel    3. Personal history of noncompliance with medical treatment, presenting hazards to health  Overview:  Pt has not been compliant with atorvastatin  Pt has had issues with compliance with ADA eating habit but doing much better with following ADA diet    Assessment & Plan:  Noncompliance with medication management.  Noncompliance with diabetes management.  Noncompliant with hypertension management.  Noncompliance with screening such as mammogram and colorectal cancer screening nor diabetic eye exams nor showing up for appointments.    Orders:  -     Ambulatory referral/consult to Diabetes Education; Future; Expected date: 10/27/2023    4. Encounter for screening involving social determinants of health (SDoH)  -     Ambulatory referral/consult to Outpatient Case Management           RESULTS:  Recent Results (from the past 1008 hour(s))   Hemoglobin A1C    Collection Time: 10/10/23  2:19 PM   Result Value Ref Range    Hemoglobin A1c 13.0 (H) <=7.0 %    Estimated Average Glucose 326.4 mg/dL   Comprehensive Metabolic Panel    Collection Time: 10/10/23  2:19 PM   Result Value Ref Range    Sodium Level 138 136 - 145 mmol/L    Potassium Level 4.3 3.5 - 5.1 mmol/L    Chloride 101 98 - 107 mmol/L    Carbon Dioxide 29 23 - 31 mmol/L    Glucose Level 340 (H) 82 - 115 mg/dL    Blood Urea Nitrogen 13.9 9.8 - 20.1 mg/dL    Creatinine 1.09 (H) 0.55 - 1.02 mg/dL    Calcium Level Total 10.0 8.4 - 10.2 mg/dL    Protein Total 8.4 (H) 5.8 - 7.6 gm/dL    Albumin Level 3.6 3.4 - 4.8 g/dL    Globulin 4.8 (H) 2.4 - 3.5 gm/dL    Albumin/Globulin Ratio 0.8 (L) 1.1 - 2.0 ratio     Bilirubin Total 0.5 <=1.5 mg/dL    Alkaline Phosphatase 166 (H) 40 - 150 unit/L    Alanine Aminotransferase 16 0 - 55 unit/L    Aspartate Aminotransferase 16 5 - 34 unit/L    eGFR 58 mls/min/1.73/m2         Follow Up:  Follow up in about 4 weeks (around 11/7/2023).     Face to face time 40 minutes, including documentation, chart review, counseling, education, review of test results, relevant medical records, and coordination of care.   I have explained the treatment plan, diagnosis, and prognosis to patient. All questions are answered to the best of my knowledge .     Previous medical history/lab work/radiology reviewed and considered during medical management decisions.   Medication list reviewed and medication reconciliation performed.  Patient was provided  and care about his/her current diagnosis (es) and medications including risk/benefit and side effects/adverse events, over the counter medication uses/doses, home self-care and contact precautions,  and red flags and indications for when to seek immediate medical attention.   Patient was advised to continue compliance with current medication list and medical recommendations.  Patient dvised continued compliance with recommended eating habits/ diets for medical conditions and exercise 150 minutes/ week (if possible) for medical condition (s).  Educational handouts and instructions on selected disease management in AVS (After Visit Summary).    All of the patient's questions were answered to patient's satisfaction.   The patient was receptive, expressed verbal understanding and agreement the above plan.     This note was created with the assistance of a voice recognition software or phone dictation. There may be transcription errors as a result of using this technology however minimal. Effort has been made to assure accuracy of transcription but any obvious errors or omissions should be clarified with the author of the document

## 2023-10-10 NOTE — ASSESSMENT & PLAN NOTE
Uncontrolled.  Initial blood pressure 206/121, repeated prior to discharge 207/117.  Patient state she stopped taking her because they do not help.  Patient had missed multiple appointment for re-evaluation of medication efficacy.  Discussed with patient not taking her medication can cause cardiovascular complications.  Patient state she will try again and will be more compliant.  Rx Diovan 80 mg sent to her preferred pharmacy and to take 1st dose today.  Patient declines to restart Norvasc 10 mg.  Patient to return for blood pressure check and if needed will add 2nd medication.  Continue with dietary habit modifications.  Follow low-salt diet less than 2 g per day.  Stay physically active and lose weight.  Questions solicited and answered, patient to read discharge education materials and to return in 4 weeks for blood pressure check.  CMP pending. Patient verbalized and agreed to plan.

## 2023-10-10 NOTE — PROGRESS NOTES
Please notify patient regarding  lab work.  Hemoglobin A1c worse from 5 months ago at 13%.  Kidney functions elevated, glucose at 340.  Notify patient's important to initiate medications as soon as possible and to return as directed.

## 2023-10-10 NOTE — ASSESSMENT & PLAN NOTE
Noncompliance with medication management.  Noncompliance with diabetes management.  Noncompliant with hypertension management.  Noncompliance with screening such as mammogram and colorectal cancer screening nor diabetic eye exams nor showing up for appointments.

## 2023-10-12 ENCOUNTER — TELEPHONE (OUTPATIENT)
Dept: FAMILY MEDICINE | Facility: CLINIC | Age: 63
End: 2023-10-12
Payer: MEDICAID

## 2023-10-12 ENCOUNTER — PATIENT OUTREACH (OUTPATIENT)
Dept: ADMINISTRATIVE | Facility: OTHER | Age: 63
End: 2023-10-12
Payer: MEDICAID

## 2023-10-12 NOTE — TELEPHONE ENCOUNTER
----- Message from WALYL Morrow sent at 10/10/2023  4:12 PM CDT -----  Please notify patient regarding  lab work.  Hemoglobin A1c worse from 5 months ago at 13%.  Kidney functions elevated, glucose at 340.  Notify patient's important to initiate medications as soon as possible and to return as directed.

## 2023-10-12 NOTE — TELEPHONE ENCOUNTER
----- Message from WALLY Morrow sent at 10/10/2023  4:12 PM CDT -----  Please notify patient regarding  lab work.  Hemoglobin A1c worse from 5 months ago at 13%.  Kidney functions elevated, glucose at 340.  Notify patient's important to initiate medications as soon as possible and to return as directed.

## 2023-10-12 NOTE — PROGRESS NOTES
CHW - Initial Contact    This Community Health Worker verified only the Social Determinant of Health questionnaire with patient via telephone today.      Pt identified barriers of most importance are: Affording prescriptions    Support and Services: chw sent referral to pharmacy assistance program via in basket      Follow up required: yes    Follow-up Outreach - Due: 10/19/2023

## 2023-10-16 ENCOUNTER — TELEPHONE (OUTPATIENT)
Dept: PHARMACY | Facility: CLINIC | Age: 63
End: 2023-10-16
Payer: MEDICAID

## 2023-10-16 NOTE — TELEPHONE ENCOUNTER
Spoke with patient about referral for medications Valsartan, Metformin, and Jardiance.  Patient picked up medication from [facility] and only paid 53.00 for a 90 day supply.  No assistance needed a this time.  Explained to the patient that is a great price for all three medications.

## 2023-10-19 ENCOUNTER — TELEPHONE (OUTPATIENT)
Dept: FAMILY MEDICINE | Facility: CLINIC | Age: 63
End: 2023-10-19
Payer: MEDICAID

## 2023-10-19 ENCOUNTER — PATIENT OUTREACH (OUTPATIENT)
Dept: ADMINISTRATIVE | Facility: OTHER | Age: 63
End: 2023-10-19
Payer: MEDICAID

## 2023-10-19 DIAGNOSIS — I10 PRIMARY HYPERTENSION: Primary | ICD-10-CM

## 2023-10-19 RX ORDER — ACETAMINOPHEN 500 MG
1 TABLET ORAL 2 TIMES DAILY PRN
Qty: 1 EACH | Refills: 0 | Status: SHIPPED | OUTPATIENT
Start: 2023-10-19 | End: 2024-04-03 | Stop reason: SDUPTHER

## 2023-10-19 NOTE — PROGRESS NOTES
CHW - Case Closure    This Community Health Worker spoke to patient via telephone today.     Pt/Caregiver reported: pt stated she did speak with pharmacy assistance  no additional resources needed at this time    Pt/Caregiver denied any additional needs at this time and agrees with episode closure at this time.  Provided patient with Community Health Worker's contact information and encouraged him/her to contact this Community Health Worker if additional needs arise.

## 2023-10-24 ENCOUNTER — TELEPHONE (OUTPATIENT)
Dept: DIABETES | Facility: CLINIC | Age: 63
End: 2023-10-24
Payer: MEDICAID

## 2023-11-08 ENCOUNTER — OFFICE VISIT (OUTPATIENT)
Dept: FAMILY MEDICINE | Facility: CLINIC | Age: 63
End: 2023-11-08
Payer: MEDICAID

## 2023-11-08 VITALS
HEART RATE: 92 BPM | DIASTOLIC BLOOD PRESSURE: 119 MMHG | OXYGEN SATURATION: 98 % | TEMPERATURE: 98 F | BODY MASS INDEX: 30.51 KG/M2 | WEIGHT: 161.63 LBS | SYSTOLIC BLOOD PRESSURE: 192 MMHG | RESPIRATION RATE: 18 BRPM | HEIGHT: 61 IN

## 2023-11-08 DIAGNOSIS — I10 PRIMARY HYPERTENSION: ICD-10-CM

## 2023-11-08 DIAGNOSIS — Z13.220 ENCOUNTER FOR LIPID SCREENING FOR CARDIOVASCULAR DISEASE: ICD-10-CM

## 2023-11-08 DIAGNOSIS — Z01.89 PATIENT REQUEST FOR DIAGNOSTIC TESTING: ICD-10-CM

## 2023-11-08 DIAGNOSIS — E78.2 MIXED HYPERLIPIDEMIA: ICD-10-CM

## 2023-11-08 DIAGNOSIS — Z12.12 ENCOUNTER FOR COLORECTAL CANCER SCREENING: ICD-10-CM

## 2023-11-08 DIAGNOSIS — Z12.31 ENCOUNTER FOR SCREENING MAMMOGRAM FOR BREAST CANCER: ICD-10-CM

## 2023-11-08 DIAGNOSIS — Z12.11 ENCOUNTER FOR COLORECTAL CANCER SCREENING: ICD-10-CM

## 2023-11-08 DIAGNOSIS — E11.65 TYPE 2 DIABETES MELLITUS WITH HYPERGLYCEMIA, WITHOUT LONG-TERM CURRENT USE OF INSULIN: Primary | ICD-10-CM

## 2023-11-08 DIAGNOSIS — Z13.6 ENCOUNTER FOR LIPID SCREENING FOR CARDIOVASCULAR DISEASE: ICD-10-CM

## 2023-11-08 LAB
CHOLEST SERPL-MCNC: 305 MG/DL
CHOLEST/HDLC SERPL: 8 {RATIO} (ref 0–5)
EST. AVERAGE GLUCOSE BLD GHB EST-MCNC: 329.3 MG/DL
GROUP & RH: NORMAL
HBA1C MFR BLD: 13.1 %
HDLC SERPL-MCNC: 37 MG/DL (ref 35–60)
LDLC SERPL CALC-MCNC: 209 MG/DL (ref 50–140)
TRIGL SERPL-MCNC: 293 MG/DL (ref 37–140)
VLDLC SERPL CALC-MCNC: 59 MG/DL

## 2023-11-08 PROCEDURE — 1159F MED LIST DOCD IN RCRD: CPT | Mod: CPTII,,, | Performed by: NURSE PRACTITIONER

## 2023-11-08 PROCEDURE — 1160F PR REVIEW ALL MEDS BY PRESCRIBER/CLIN PHARMACIST DOCUMENTED: ICD-10-PCS | Mod: CPTII,,, | Performed by: NURSE PRACTITIONER

## 2023-11-08 PROCEDURE — 99214 PR OFFICE/OUTPT VISIT, EST, LEVL IV, 30-39 MIN: ICD-10-PCS | Mod: S$PBB,,, | Performed by: NURSE PRACTITIONER

## 2023-11-08 PROCEDURE — 3060F POS MICROALBUMINURIA REV: CPT | Mod: CPTII,,, | Performed by: NURSE PRACTITIONER

## 2023-11-08 PROCEDURE — 4010F ACE/ARB THERAPY RXD/TAKEN: CPT | Mod: CPTII,,, | Performed by: NURSE PRACTITIONER

## 2023-11-08 PROCEDURE — 83036 HEMOGLOBIN GLYCOSYLATED A1C: CPT | Performed by: NURSE PRACTITIONER

## 2023-11-08 PROCEDURE — 3066F NEPHROPATHY DOC TX: CPT | Mod: CPTII,,, | Performed by: NURSE PRACTITIONER

## 2023-11-08 PROCEDURE — 3080F PR MOST RECENT DIASTOLIC BLOOD PRESSURE >= 90 MM HG: ICD-10-PCS | Mod: CPTII,,, | Performed by: NURSE PRACTITIONER

## 2023-11-08 PROCEDURE — 3008F PR BODY MASS INDEX (BMI) DOCUMENTED: ICD-10-PCS | Mod: CPTII,,, | Performed by: NURSE PRACTITIONER

## 2023-11-08 PROCEDURE — 3066F PR DOCUMENTATION OF TREATMENT FOR NEPHROPATHY: ICD-10-PCS | Mod: CPTII,,, | Performed by: NURSE PRACTITIONER

## 2023-11-08 PROCEDURE — 3008F BODY MASS INDEX DOCD: CPT | Mod: CPTII,,, | Performed by: NURSE PRACTITIONER

## 2023-11-08 PROCEDURE — 99214 OFFICE O/P EST MOD 30 MIN: CPT | Mod: S$PBB,,, | Performed by: NURSE PRACTITIONER

## 2023-11-08 PROCEDURE — 3077F SYST BP >= 140 MM HG: CPT | Mod: CPTII,,, | Performed by: NURSE PRACTITIONER

## 2023-11-08 PROCEDURE — 80061 LIPID PANEL: CPT | Performed by: NURSE PRACTITIONER

## 2023-11-08 PROCEDURE — 86901 BLOOD TYPING SEROLOGIC RH(D): CPT | Performed by: NURSE PRACTITIONER

## 2023-11-08 PROCEDURE — 1159F PR MEDICATION LIST DOCUMENTED IN MEDICAL RECORD: ICD-10-PCS | Mod: CPTII,,, | Performed by: NURSE PRACTITIONER

## 2023-11-08 PROCEDURE — 1160F RVW MEDS BY RX/DR IN RCRD: CPT | Mod: CPTII,,, | Performed by: NURSE PRACTITIONER

## 2023-11-08 PROCEDURE — 36415 COLL VENOUS BLD VENIPUNCTURE: CPT | Performed by: NURSE PRACTITIONER

## 2023-11-08 PROCEDURE — 99215 OFFICE O/P EST HI 40 MIN: CPT | Mod: PBBFAC | Performed by: NURSE PRACTITIONER

## 2023-11-08 PROCEDURE — 4010F PR ACE/ARB THEARPY RXD/TAKEN: ICD-10-PCS | Mod: CPTII,,, | Performed by: NURSE PRACTITIONER

## 2023-11-08 PROCEDURE — 3077F PR MOST RECENT SYSTOLIC BLOOD PRESSURE >= 140 MM HG: ICD-10-PCS | Mod: CPTII,,, | Performed by: NURSE PRACTITIONER

## 2023-11-08 PROCEDURE — 3046F HEMOGLOBIN A1C LEVEL >9.0%: CPT | Mod: CPTII,,, | Performed by: NURSE PRACTITIONER

## 2023-11-08 PROCEDURE — 3060F PR POS MICROALBUMINURIA RESULT DOCUMENTED/REVIEW: ICD-10-PCS | Mod: CPTII,,, | Performed by: NURSE PRACTITIONER

## 2023-11-08 PROCEDURE — 3080F DIAST BP >= 90 MM HG: CPT | Mod: CPTII,,, | Performed by: NURSE PRACTITIONER

## 2023-11-08 PROCEDURE — 3046F PR MOST RECENT HEMOGLOBIN A1C LEVEL > 9.0%: ICD-10-PCS | Mod: CPTII,,, | Performed by: NURSE PRACTITIONER

## 2023-11-08 RX ORDER — VALSARTAN 160 MG/1
160 TABLET ORAL DAILY
Qty: 90 TABLET | Refills: 3 | Status: SHIPPED | OUTPATIENT
Start: 2023-11-08 | End: 2024-04-03 | Stop reason: SDDI

## 2023-11-08 RX ORDER — ORAL SEMAGLUTIDE 3 MG/1
3 TABLET ORAL DAILY
Qty: 30 TABLET | Refills: 0 | Status: SHIPPED | OUTPATIENT
Start: 2023-11-08 | End: 2023-12-08

## 2023-11-08 RX ORDER — NIFEDIPINE 60 MG/1
60 TABLET, EXTENDED RELEASE ORAL DAILY
Qty: 90 TABLET | Refills: 3 | Status: SHIPPED | OUTPATIENT
Start: 2023-11-08 | End: 2024-04-03 | Stop reason: SDDI

## 2023-11-08 RX ORDER — ATORVASTATIN CALCIUM 80 MG/1
80 TABLET, FILM COATED ORAL DAILY
Qty: 90 TABLET | Refills: 3 | Status: SHIPPED | OUTPATIENT
Start: 2023-11-08 | End: 2024-04-03 | Stop reason: SDUPTHER

## 2023-11-08 RX ORDER — ORAL SEMAGLUTIDE 7 MG/1
7 TABLET ORAL DAILY
Qty: 30 TABLET | Refills: 5 | Status: SHIPPED | OUTPATIENT
Start: 2023-12-09 | End: 2024-04-03 | Stop reason: SDDI

## 2023-11-08 NOTE — PROGRESS NOTES
Patient Name: Paola Cm   : 1960  MRN: [id number]     SUBJECTIVE DATA:    CHIEF COMPLAINT:   Paola Cm is a 62 y.o. female who presents to clinic today with Follow-up and Hypertension (Patient states she is having bilateral knee pain, can only walk for short distances and then has to stop.)        HPI:  Female presents to the clinic to follow-up on hypertension and diabetes.    Hypertension:  Uncontrolled.  Patient history of noncompliance with medications.  Patient is always hesitant on trying medications.  Last visit patient had agreed to start on blood pressure medication where she came to realized that she needs to take her medication hypertension management.  Current blood pressure 192/119.  Patient currently take Diovan 80 mg p.o. once daily.  Discussed with patient will increase Diovan to 160 mg p.o. daily and add a 2nd medication nifedipine 60 mg p.o. once daily.  Patient to return in 2 weeks for follow-up or sooner if needed.  Continue with low-salt diet less than 2 g per day and stay physically active and stay hydrated with water. Questions solicited and answered, patient verbalized and agreed to plan.    Diabetes:  Uncontrolled.  Patient has a history of noncompliance with medications regiment.  Patient is always hesitant on trying medications.  Last visit patient had agreed to start on diabetic medication where she came to realize that she needs to take her medications for diabetes management.  Patient currently take metformin 500 mg p.o. b.i.d. and Jardiance 10 mg p.o. once daily.  Hemoglobin A1c pending will notify patient of test results when they become available and manage accordingly.  Patient declined Ozempic or other GLP medications or long acting insulin such as Lantus.  Discussed with patient to continue follow diabetic diet meal planning, stay hydrated with water and return to clinic as discussed.  Questions solicited and answered, patient verbalized.    2023 addendum:   "Hemoglobin A1c no improvement at 13%.  Discussed with patient will increase Jardiance to 25 mg p.o. daily and add Rybelsus 3 mg p.o. daily for 30 days, take on empty stomach 30 minutes before 1st meal. and then  2nd prescription Rybelsus 7 mg p.o. daily on empty stomach 30 minutes before 1st meal.  Tight diabetic diet meal planning.  Return to clinic as directed.    Blood type:  Patient requesting to know her blood type.  Blood drawn will notify patient of results.    Mixed hyperlipidemia:  Total cholesterol 305, HDL 37, triglyceride 293, .    The 10-year ASCVD risk score (Keanu TANNER, et al., 2019) is: 64.6%    Values used to calculate the score:      Age: 62 years      Sex: Female      Is Non- : Yes      Diabetic: Yes      Tobacco smoker: No      Systolic Blood Pressure: 192 mmHg      Is BP treated: Yes      HDL Cholesterol: 37 mg/dL      Total Cholesterol: 305 mg/dL   Rx Lipitor 80 mg p.o. nightly.  Stay hydrated with water.  Keep fiber intake 30 g per day if possible.  Questions solicited and answered, patient verbalized and agreed to plan.    Patient denies chest pain, shortness of breath, dyspnea on exertion, palpitations, peripheral edema, abdominal pain, nausea, vomiting, diarrhea, constipation, fatigue, fever, chills, dysuria,  hematuria, melena, or hematochezia.     ALLERGIES:   Review of patient's allergies indicates:   Allergen Reactions    Latex Itching         ROS:  Review of Systems   All other systems reviewed and are negative.        OBJECTIVE DATA:  Vital signs  Vitals:    11/08/23 1235 11/08/23 1236 11/08/23 1317   BP: (!) 233/124 (!) 217/114 (!) 192/119   Pulse: 92     Resp: 18     Temp: 98.4 °F (36.9 °C)     TempSrc: Oral     SpO2: 98%     Weight: 73.3 kg (161 lb 9.6 oz)     Height: 5' 1" (1.549 m)        Body mass index is 30.53 kg/m².    PHYSICAL EXAM:   Physical Exam  Vitals and nursing note reviewed.   Constitutional:       General: She is awake. She is " not in acute distress.     Appearance: Normal appearance. She is well-developed and well-groomed. She is obese. She is not ill-appearing, toxic-appearing or diaphoretic.   HENT:      Head: Normocephalic and atraumatic.      Right Ear: Tympanic membrane, ear canal and external ear normal.      Left Ear: Tympanic membrane, ear canal and external ear normal.      Nose: Nose normal.      Mouth/Throat:      Mouth: Mucous membranes are moist.      Pharynx: Oropharynx is clear. Uvula midline.   Eyes:      General: Lids are normal. No scleral icterus.     Extraocular Movements: Extraocular movements intact.      Conjunctiva/sclera: Conjunctivae normal.      Pupils: Pupils are equal, round, and reactive to light.   Neck:      Trachea: Trachea and phonation normal.   Cardiovascular:      Rate and Rhythm: Normal rate and regular rhythm.      Pulses: Normal pulses.           Radial pulses are 2+ on the right side and 2+ on the left side.      Heart sounds: Normal heart sounds.   Pulmonary:      Effort: Pulmonary effort is normal.      Breath sounds: Normal breath sounds and air entry.   Abdominal:      Palpations: Abdomen is soft.   Musculoskeletal:         General: Normal range of motion.      Cervical back: Normal range of motion and neck supple.   Skin:     General: Skin is warm and dry.      Capillary Refill: Capillary refill takes less than 2 seconds.   Neurological:      General: No focal deficit present.      Mental Status: She is alert and oriented to person, place, and time. Mental status is at baseline.      GCS: GCS eye subscore is 4. GCS verbal subscore is 5. GCS motor subscore is 6.      Cranial Nerves: No cranial nerve deficit.      Sensory: No sensory deficit.      Motor: No weakness.      Coordination: Coordination normal.      Gait: Gait normal.   Psychiatric:         Attention and Perception: Attention and perception normal.         Mood and Affect: Mood and affect normal.         Speech: Speech normal.          Behavior: Behavior normal. Behavior is cooperative.         Thought Content: Thought content normal.         Cognition and Memory: Cognition and memory normal.         Judgment: Judgment normal.          ASSESSMENT/PLAN:  1. Type 2 diabetes mellitus with hyperglycemia, without long-term current use of insulin  Assessment & Plan:  Uncontrolled.  Patient has a history of noncompliance with medications regiment.  Patient is always hesitant on trying medications.  Last visit patient had agreed to start on diabetic medication where she came to realize that she needs to take her medications for diabetes management.  Patient currently take metformin 500 mg p.o. b.i.d. and Jardiance 10 mg p.o. once daily.  Hemoglobin A1c pending will notify patient of test results when they become available and manage accordingly.  Patient declined Ozempic or other GLP medications or long acting insulin such as Lantus.  Discussed with patient to continue follow diabetic diet meal planning, stay hydrated with water and return to clinic as discussed.  Questions solicited and answered, patient verbalized.    Orders:  -     Lipid Panel  -     Hemoglobin A1C  -     empagliflozin (JARDIANCE) 25 mg tablet; Take 1 tablet (25 mg total) by mouth once daily.  Dispense: 90 tablet; Refill: 3  -     semaglutide (RYBELSUS) 3 mg tablet; Take 1 tablet (3 mg total) by mouth once daily.  Dispense: 30 tablet; Refill: 0  -     semaglutide (RYBELSUS) 7 mg tablet; Take 1 tablet (7 mg total) by mouth once daily.  Dispense: 30 tablet; Refill: 5    2. Primary hypertension  Assessment & Plan:   Uncontrolled.  Patient history of noncompliance with medications.  Patient is always hesitant on trying medications.  Last visit patient had agreed to start on blood pressure medication where she came to realized that she needs to take her medication hypertension management.  Current blood pressure 192/119.  Patient currently take Diovan 80 mg p.o. once daily.  Discussed with  patient will increase Diovan to 160 mg p.o. daily and add a 2nd medication nifedipine 60 mg p.o. once daily.  Patient to return in 2 weeks for follow-up or sooner if needed.  Continue with low-salt diet less than 2 g per day and stay physically active and stay hydrated with water. Questions solicited and answered, patient verbalized and agreed to plan.    Orders:  -     NIFEdipine (PROCARDIA-XL) 60 MG (OSM) 24 hr tablet; Take 1 tablet (60 mg total) by mouth once daily.  Dispense: 90 tablet; Refill: 3  -     valsartan (DIOVAN) 160 MG tablet; Take 1 tablet (160 mg total) by mouth once daily.  Dispense: 90 tablet; Refill: 3    3. Mixed hyperlipidemia  Overview:  The patient presents with hyperlipidemia.  The patient reports tolerating the medication well and is in excellent compliance.  There have been no medication side effects.  The patient denies chest pain, neuropathy, and myalgias.  The patient has reduced fat intake and has been exercising.  Current treatment has included the medications listed in the med card.    Lab Results   Component Value Date    CHOL 269 04/20/2022       Lab Results   Component Value Date    HDL 34 04/20/2022     No results found for: LDLCALC    Lab Results   Component Value Date    TRIG 177 04/20/2022     No results found for: CHOLHDL  Lab Results   Component Value Date    ALT 15 02/28/2022    AST 14 02/28/2022    ALKPHOS 167 02/28/2022    BILITOT 0.4 02/28/2022         Assessment & Plan:  Total cholesterol 305, HDL 37, triglyceride 293, .  Rx Lipitor 80 mg p.o. nightly.  Stay hydrated with water.  Keep fiber intake 30 g per day if possible.  Questions solicited and answered, patient verbalized and agreed to plan.  The 10-year ASCVD risk score (Keanu DK, et al., 2019) is: 64.6%    Values used to calculate the score:      Age: 62 years      Sex: Female      Is Non- : Yes      Diabetic: Yes      Tobacco smoker: No      Systolic Blood Pressure: 192 mmHg      Is  BP treated: Yes      HDL Cholesterol: 37 mg/dL      Total Cholesterol: 305 mg/dL    Orders:  -     atorvastatin (LIPITOR) 80 MG tablet; Take 1 tablet (80 mg total) by mouth once daily.  Dispense: 90 tablet; Refill: 3    4. Patient request for diagnostic testing  Assessment & Plan:   Patient requesting to know her blood type.  Blood drawn will notify patient of results.    Orders:  -     ABO/Rh    5. Encounter for lipid screening for cardiovascular disease    6. Encounter for colorectal cancer screening  -     OCCULT BLOOD FECAL IMMUNOASSAY; Future; Expected date: 11/08/2023    7. Encounter for screening mammogram for breast cancer  -     Mammo Digital Diagnostic Bilat with [facility]; Future; Expected date: 11/08/2023           RESULTS:  Recent Results (from the past 1008 hour(s))   Hemoglobin A1C    Collection Time: 10/10/23  2:19 PM   Result Value Ref Range    Hemoglobin A1c 13.0 (H) <=7.0 %    Estimated Average Glucose 326.4 mg/dL   Comprehensive Metabolic Panel    Collection Time: 10/10/23  2:19 PM   Result Value Ref Range    Sodium Level 138 136 - 145 mmol/L    Potassium Level 4.3 3.5 - 5.1 mmol/L    Chloride 101 98 - 107 mmol/L    Carbon Dioxide 29 23 - 31 mmol/L    Glucose Level 340 (H) 82 - 115 mg/dL    Blood Urea Nitrogen 13.9 9.8 - 20.1 mg/dL    Creatinine 1.09 (H) 0.55 - 1.02 mg/dL    Calcium Level Total 10.0 8.4 - 10.2 mg/dL    Protein Total 8.4 (H) 5.8 - 7.6 gm/dL    Albumin Level 3.6 3.4 - 4.8 g/dL    Globulin 4.8 (H) 2.4 - 3.5 gm/dL    Albumin/Globulin Ratio 0.8 (L) 1.1 - 2.0 ratio    Bilirubin Total 0.5 <=1.5 mg/dL    Alkaline Phosphatase 166 (H) 40 - 150 unit/L    Alanine Aminotransferase 16 0 - 55 unit/L    Aspartate Aminotransferase 16 5 - 34 unit/L    eGFR 58 mls/min/1.73/m2   Lipid Panel    Collection Time: 11/08/23  1:26 PM   Result Value Ref Range    Cholesterol Total 305 (H) <=200 mg/dL    HDL Cholesterol 37 35 - 60 mg/dL    Triglyceride 293 (H) 37 - 140 mg/dL    Cholesterol/HDL Ratio 8 (H) 0 - 5     Very Low Density Lipoprotein 59     LDL Cholesterol 209.00 (H) 50.00 - 140.00 mg/dL   Hemoglobin A1C    Collection Time: 11/08/23  1:26 PM   Result Value Ref Range    Hemoglobin A1c 13.1 (H) <=7.0 %    Estimated Average Glucose 329.3 mg/dL   ABO/Rh    Collection Time: 11/08/23  1:26 PM   Result Value Ref Range    Group & Rh O POS          Follow Up:  Follow up in about 1 month (around 12/8/2023).     Face to face time 30 minutes, including documentation, chart review, counseling, education, review of test results, relevant medical records, and coordination of care.   I have explained the treatment plan, diagnosis, and prognosis to patient. All questions are answered to the best of my knowledge.     Previous medical history/lab work/radiology reviewed and considered during medical management decisions.   Medication list reviewed and medication reconciliation performed.  Patient was provided  and care about his/her current diagnosis (es) and medications including risk/benefit and side effects/adverse events, over the counter medication uses/doses, home self-care and contact precautions,  and red flags and indications for when to seek immediate medical attention.   Patient was advised to continue compliance with current medication list and medical recommendations.  Patient dvised continued compliance with recommended eating habits/ diets for medical conditions and exercise 150 minutes/ week (if possible) for medical condition (s).  Educational handouts and instructions on selected disease management in AVS (After Visit Summary).    All of the patient's questions were answered to patient's satisfaction.   The patient was receptive, expressed verbal understanding and agreement the above plan.     This note was created with the assistance of a voice recognition software or phone dictation. There may be transcription errors as a result of using this technology however minimal. Effort has been made to assure  accuracy of transcription but any obvious errors or omissions should be clarified with the author of the document

## 2023-11-08 NOTE — ASSESSMENT & PLAN NOTE
Total cholesterol 305, HDL 37, triglyceride 293, .  Rx Lipitor 80 mg p.o. nightly.  Stay hydrated with water.  Keep fiber intake 30 g per day if possible.  Questions solicited and answered, patient verbalized and agreed to plan.  The 10-year ASCVD risk score (Keanu TANNER, et al., 2019) is: 64.6%    Values used to calculate the score:      Age: 62 years      Sex: Female      Is Non- : Yes      Diabetic: Yes      Tobacco smoker: No      Systolic Blood Pressure: 192 mmHg      Is BP treated: Yes      HDL Cholesterol: 37 mg/dL      Total Cholesterol: 305 mg/dL

## 2023-11-08 NOTE — PROGRESS NOTES
Discussed labs over the phone with patient, patient id self with accurate date of birth.  Questions solicited and answered, medication sent to preferred pharmacy and patient to start as soon as possible.  Patient verbalized and agreed to plan.

## 2023-11-08 NOTE — ASSESSMENT & PLAN NOTE
Uncontrolled.  Patient history of noncompliance with medications.  Patient is always hesitant on trying medications.  Last visit patient had agreed to start on blood pressure medication where she came to realized that she needs to take her medication hypertension management.  Current blood pressure 192/119.  Patient currently take Diovan 80 mg p.o. once daily.  Discussed with patient will increase Diovan to 160 mg p.o. daily and add a 2nd medication nifedipine 60 mg p.o. once daily.  Patient to return in 2 weeks for follow-up or sooner if needed.  Continue with low-salt diet less than 2 g per day and stay physically active and stay hydrated with water. Questions solicited and answered, patient verbalized and agreed to plan.

## 2023-11-08 NOTE — ASSESSMENT & PLAN NOTE
Uncontrolled.  Patient has a history of noncompliance with medications regiment.  Patient is always hesitant on trying medications.  Last visit patient had agreed to start on diabetic medication where she came to realize that she needs to take her medications for diabetes management.  Patient currently take metformin 500 mg p.o. b.i.d. and Jardiance 10 mg p.o. once daily.  Hemoglobin A1c pending will notify patient of test results when they become available and manage accordingly.  Patient declined Ozempic or other GLP medications or long acting insulin such as Lantus.  Discussed with patient to continue follow diabetic diet meal planning, stay hydrated with water and return to clinic as discussed.  Questions solicited and answered, patient verbalized.

## 2023-11-08 NOTE — PATIENT INSTRUCTIONS
Curtis Guevara,     If you are due for any health screening(s) below please notify me so we can arrange them to be ordered and scheduled. Most healthy patients at your age complete them, but you are free to accept or refuse.     If you can't do it, I'll definitely understand. If you can, I'd certainly appreciate it!    Tests to Keep You Healthy    Mammogram: ORDERED BUT NOT SCHEDULED  Eye Exam: Met on 2/1/2023  Colon Cancer Screening: ORDERED  Last Blood Pressure <= 139/89 (11/8/2023): NO  Last HbA1c < 8 (10/10/2023): NO      Schedule your breast cancer screening today     Breast cancer is the second most common cancer in women,  and the second leading cause of death from cancer. Mammograms can detect breast cancer early, which significantly increases the chances of curing the cancer.       Our records indicate that you may be overdue for breast cancer screening. Cancer screenings save lives, so schedule yours today to stay healthy.     If you recently had a mammogram performed outside of [facility], please let your Health care team know so that they can update your health record.        Its time for your colon cancer screening     Colorectal cancer is one of the leading causes of cancer death for men and women but it doesnt have to be. Screenings can prevent colorectal cancer or find it early enough to treat and cure the disease.     Our records indicate that you may be overdue for colon cancer screening. A colonoscopy or stool screening test can help identify patients at risk for developing colon cancer. Cancer screenings save lives, so schedule yours today to stay healthy.     A colonoscopy is the preferred test for detecting colon cancer. It is needed only once every 10 years if results are negative. While you are sedated, a flexible, lighted tube with a tiny camera is inserted into the rectum and advanced through the colon to look for cancers.     An alternative screening test that is used at home and  returned to the lab may also be used. It detects hidden blood in bowel movements which could indicate cancer in the colon. If results are positive, you will need a colonoscopy to determine if the blood is a sign of cancer. This type of follow up (diagnostic) colonoscopy usually requires additional copays as required by your insurance provider.     If you recently had your colon cancer screening performed outside of [facility], please let your Health care team know so that they can update your health record. Please contact your PCP if you have any questions.    Lets manage your high blood pressure     Your blood pressure was above 140/90 today during your visit. We recommend that you schedule a nurse visit in two weeks to check your blood pressure and discuss ways to support your health goals.     You can also manage your health and record your blood pressure from the comfort of home by keeping a daily blood pressure log. These results are shared with and reviewed by your provider. Please print this form (Daily Blood Pressure Log) to assist you in keeping track of your blood pressure at home.     Schedule your nurse visit in two weeks to learn more about how to track and manage high blood pressure.    Daily Blood Pressure Log    Name:__________________________________                  Date of Birth:_________    Average Blood Pressure:  __________      Date: Time  (a.m.) Blood  Pressure: Pulse  Rate: Time  (p.m.) Blood  Pressure : Pulse  Rate:   Sample 8:37 127/83 84                                                                                                                                                                                   Lets manage your A1c levels     Your last hemoglobin A1c was higher than the goal of less than 8. Hemoglobin A1c or HbA1c is a blood test that measures your average blood sugar levels over the past 3 months. It is the main test to help you and your health care team  manage your diabetes.     Higher A1c levels are linked to diabetes complications, such as loss of vision, kidney disease, and nerve damage. Keeping your A1c at least less than 8 is important to stay healthy and we are here to help. Talk with your provider on how you can further improve your A1c.     We recommend that you set up blood work to get a repeat hemoglobin A1c in 3 months to monitor your diabetes. Let your health care team know if you have questions.

## 2023-11-13 ENCOUNTER — OFFICE VISIT (OUTPATIENT)
Dept: ORTHOPEDICS | Facility: CLINIC | Age: 63
End: 2023-11-13
Payer: MEDICAID

## 2023-11-13 VITALS
HEIGHT: 61 IN | WEIGHT: 159.81 LBS | HEART RATE: 87 BPM | BODY MASS INDEX: 30.17 KG/M2 | SYSTOLIC BLOOD PRESSURE: 168 MMHG | DIASTOLIC BLOOD PRESSURE: 70 MMHG

## 2023-11-13 DIAGNOSIS — M17.0 PRIMARY OSTEOARTHRITIS OF BOTH KNEES: Primary | ICD-10-CM

## 2023-11-13 PROCEDURE — 1160F RVW MEDS BY RX/DR IN RCRD: CPT | Mod: CPTII,,, | Performed by: NURSE PRACTITIONER

## 2023-11-13 PROCEDURE — 3080F PR MOST RECENT DIASTOLIC BLOOD PRESSURE >= 90 MM HG: ICD-10-PCS | Mod: CPTII,,, | Performed by: NURSE PRACTITIONER

## 2023-11-13 PROCEDURE — 3077F PR MOST RECENT SYSTOLIC BLOOD PRESSURE >= 140 MM HG: ICD-10-PCS | Mod: CPTII,,, | Performed by: NURSE PRACTITIONER

## 2023-11-13 PROCEDURE — 3060F POS MICROALBUMINURIA REV: CPT | Mod: CPTII,,, | Performed by: NURSE PRACTITIONER

## 2023-11-13 PROCEDURE — 1159F PR MEDICATION LIST DOCUMENTED IN MEDICAL RECORD: ICD-10-PCS | Mod: CPTII,,, | Performed by: NURSE PRACTITIONER

## 2023-11-13 PROCEDURE — 4010F ACE/ARB THERAPY RXD/TAKEN: CPT | Mod: CPTII,,, | Performed by: NURSE PRACTITIONER

## 2023-11-13 PROCEDURE — 99214 OFFICE O/P EST MOD 30 MIN: CPT | Mod: S$PBB,,, | Performed by: NURSE PRACTITIONER

## 2023-11-13 PROCEDURE — 3008F BODY MASS INDEX DOCD: CPT | Mod: CPTII,,, | Performed by: NURSE PRACTITIONER

## 2023-11-13 PROCEDURE — 3080F DIAST BP >= 90 MM HG: CPT | Mod: CPTII,,, | Performed by: NURSE PRACTITIONER

## 2023-11-13 PROCEDURE — 3066F PR DOCUMENTATION OF TREATMENT FOR NEPHROPATHY: ICD-10-PCS | Mod: CPTII,,, | Performed by: NURSE PRACTITIONER

## 2023-11-13 PROCEDURE — 1160F PR REVIEW ALL MEDS BY PRESCRIBER/CLIN PHARMACIST DOCUMENTED: ICD-10-PCS | Mod: CPTII,,, | Performed by: NURSE PRACTITIONER

## 2023-11-13 PROCEDURE — 99214 OFFICE O/P EST MOD 30 MIN: CPT | Mod: PBBFAC | Performed by: NURSE PRACTITIONER

## 2023-11-13 PROCEDURE — 3046F PR MOST RECENT HEMOGLOBIN A1C LEVEL > 9.0%: ICD-10-PCS | Mod: CPTII,,, | Performed by: NURSE PRACTITIONER

## 2023-11-13 PROCEDURE — 3046F HEMOGLOBIN A1C LEVEL >9.0%: CPT | Mod: CPTII,,, | Performed by: NURSE PRACTITIONER

## 2023-11-13 PROCEDURE — 3060F PR POS MICROALBUMINURIA RESULT DOCUMENTED/REVIEW: ICD-10-PCS | Mod: CPTII,,, | Performed by: NURSE PRACTITIONER

## 2023-11-13 PROCEDURE — 99214 PR OFFICE/OUTPT VISIT, EST, LEVL IV, 30-39 MIN: ICD-10-PCS | Mod: S$PBB,,, | Performed by: NURSE PRACTITIONER

## 2023-11-13 PROCEDURE — 1159F MED LIST DOCD IN RCRD: CPT | Mod: CPTII,,, | Performed by: NURSE PRACTITIONER

## 2023-11-13 PROCEDURE — 3008F PR BODY MASS INDEX (BMI) DOCUMENTED: ICD-10-PCS | Mod: CPTII,,, | Performed by: NURSE PRACTITIONER

## 2023-11-13 PROCEDURE — 3077F SYST BP >= 140 MM HG: CPT | Mod: CPTII,,, | Performed by: NURSE PRACTITIONER

## 2023-11-13 PROCEDURE — 3066F NEPHROPATHY DOC TX: CPT | Mod: CPTII,,, | Performed by: NURSE PRACTITIONER

## 2023-11-13 PROCEDURE — 4010F PR ACE/ARB THEARPY RXD/TAKEN: ICD-10-PCS | Mod: CPTII,,, | Performed by: NURSE PRACTITIONER

## 2023-11-13 NOTE — PROGRESS NOTES
"  Subjective:   PATIENT ID: Paola Cm is a 62 y.o. female. Non-smoker. Employment HX: , currently retired.    Seen [facility] ortho for same DX since n/a.   CHIEF COMPLAINT: No chief complaint on file.    HPI:    Bilateral L < R aching medial knee pain.   Injury: no known injury  Onset: several years ago fluctuates   Modifying Factors: worse with activity, improves with rest, stiffness after immobilization, and improves with less than 30 minutes activity  Associated Symptoms: crepitus, decreased ROM, and "giving out"   Activity: sedentary with light activity and pain moderately interferes with ADLs   Previous Treatments:  BMI reduction ongoing education, HEP withTheraBand, RX NSAIDs, and CSI since years ago last injection years ago  with some relief but symptoms returning  PMH: + DM   Family History: + OA    NOTE: Follow up for bilateral knee DJD.  Conservative treatments providing adequate relief at this time and does not desire surgical treatment options at this time.  At prior visit patient started conservative treatments of HEP, BMI reductions and topical diclofenac. Symptoms improved slightly but still affecting ADLs.  Requesting [facility] handicap license form to be filled out.  Does not want surgical options or injections at this time.      Current Outpatient Medications:     atorvastatin (LIPITOR) 80 MG tablet, Take 1 tablet (80 mg total) by mouth once daily., Disp: 90 tablet, Rfl: 3    blood pressure monitor Kit, 1 each by Misc.(Non-Drug; Combo Route) route 2 (two) times daily as needed (blood pressure)., Disp: 1 each, Rfl: 0    empagliflozin (JARDIANCE) 25 mg tablet, Take 1 tablet (25 mg total) by mouth once daily., Disp: 90 tablet, Rfl: 3    metFORMIN (GLUCOPHAGE) 500 MG tablet, Take 1 tablet (500 mg total) by mouth 2 (two) times daily with meals., Disp: 180 tablet, Rfl: 3    NIFEdipine (PROCARDIA-XL) 60 MG (OSM) 24 hr tablet, Take 1 tablet (60 mg total) by mouth once daily., Disp: 90 tablet, Rfl: 3    " semaglutide (RYBELSUS) 3 mg tablet, Take 1 tablet (3 mg total) by mouth once daily., Disp: 30 tablet, Rfl: 0    [START ON 12/9/2023] semaglutide (RYBELSUS) 7 mg tablet, Take 1 tablet (7 mg total) by mouth once daily., Disp: 30 tablet, Rfl: 5    TRUE METRIX GLUCOSE TEST STRIP Strp, USE TO CHECK BLOOD SUGARS DAILY, Disp: , Rfl:     valsartan (DIOVAN) 160 MG tablet, Take 1 tablet (160 mg total) by mouth once daily., Disp: 90 tablet, Rfl: 3  Review of patient's allergies indicates:   Allergen Reactions    Latex Itching     Hemoglobin A1c   Date Value Ref Range Status   11/08/2023 13.1 (H) <=7.0 % Final   10/10/2023 13.0 (H) <=7.0 % Final   05/04/2023 12.3 (H) <=7.0 % Final      There is no height or weight on file to calculate BMI.   There were no vitals filed for this visit.     REVIEW OF SYSTEMS:  A ten-point review of systems was performed and is negative, except as mentioned above   Objective:   MSK Bilateral Knee  General:  No apparent distress, no pain indicators, obesity   Inspection: limping gait, mild effusion, full weight bearing, no erythema, no contusion, mild quad deconditioning, varus deformity   Palpation: BILATERAL knees tenderness with palpation at medial joint line, peripatellar soft tissue, non-tender: patellar tendon, tibial plateau  ROM:    Active Flexion / Extension (0-140):  0 / 116 painful (R)  1 / 118 painful (L)  Strength:   Flexion     4 / 5 (R)  4 / 5 (L)  Extension     4 / 5 (R)  4 / 5 (L)  Special Testing:  IT Band Testing  Praveena's Test:  -- (R)  -- (L)  Effusion Testing  Ballotable Effusion:   -- (R)  -- (L)  Fluid Wave:    -- (R)  -- (L)  Patellar Testing  Patellar Grind:    + (R)  + (L)  Patellar Facet Pain:   + (R)  + (L)  Apprehension:    -- (R)  -- (L)  Meniscal Testing  Steph's test:    + (R)  + (L)  Thessaly's Test:    Not Tested (R)  Not Tested (L)  Ligament Testing  ACL Anterior Drawer:   -- (R) -- (L)  PCL Posterior Drawer:   -- (R) -- (L)  LCL Varus Test:    -- (R) -- (L)  MCL  Valgus Test:    -- (R) -- (L)  Hip Exam normal  Ankle Exam normal  Neurovascular: Intact to light touch  Neuro/ Psych: Awake, alert, oriented, normal mood and affect  Lymphatic: No LAD  Skin/ Soft Tissue: no rash, skin intact  Assessment:   IMAGING: X-ray 4 views of right and left knee dated 7/11/23 reviewed and independently interpreted by me.  Discussed with patient. Noted no acute, DJD noted.    XR KNEE COMP 4 OR MORE VIEWS RIGHT 7/11/23  CLINICAL HISTORY:  Pain in right knee  COMPARISON:  None.  FINDINGS:  No acute displaced fractures or dislocations.  There is complete obliteration of the medial compartment of the knee joint with sclerosis marginal osteophytosis and subchondral cyst formation degenerative changes also identified of the lateral compartment and the patellofemoral joint articular spaces are otherwise preserved with smooth articular surfaces on the standing projection there are significant degenerative changes of the contralateral knee  No blastic or lytic lesions.  Soft tissues within normal limits.  Impression:  Tricompartmental degenerative changes.  Degenerative changes of the contralateral knee.    XR KNEE COMP 4 OR MORE VIEWS LEFT 7/11/23  CLINICAL HISTORY:  Pain in left knee  COMPARISON:  None.  FINDINGS:  No acute displaced fractures or dislocations.  There is some most complete obliteration of the medial compartment of the knee joint with sclerosis marginal osteophytosis and subchondral cyst formation degenerative changes also identified of the lateral compartment and the patellofemoral joint articular spaces otherwise preserved with smooth articular surfaces  No blastic or lytic lesions.  Soft tissues within normal limits.  Impression:  Tricompartmental degenerative changes.    EMR REVIEW: completed with noted prior visit 7/11/23 reviewed.    DIAGNOSIS:  1. Primary osteoarthritis of both knees    2. BMI 31.0-31.9,adult      Plan:      Ongoing education about DX and treatment  recommendations including conservative treatments of daily HEP with TheraBand, BMI reduction goal 5-10% of body weight (140# overall goal for BMI <40), muscle strengthening with use of stationary bike (RPM set at 80 or > with slow progression to goal of 40 minutes 3-4 times per week as tolerated), adequate vit D/C, glucosamine 1500 mg/day and daily acetaminophen 1000 mg 3 times/ day if able to tolerate.    Treatment plan: Moderate exacerbation of chronic Bilateral R = L End stage knee arthritis Patient reports not interested in injections or surgical options at this time.  I recommend discussing with PCP continuing medication regimen, BMI reduction and consistently doing HEP 2-3 times per week in order to maintain as much symptom relief as possible.  Patient encouraged to f/u with PCP for continued primary care and RTC in future if symptoms return.   Will fill out DMV form if patient brings to clinic this time.  Further forms can be completed by PCP. Verbalized understanding and denies questions.     Procedure: CSI v. VS injections discussed as treatment options in future if conservative treatments fail.   CSI offered today but patient declines.   RX Medications: continue medications as RX per PCP.  RTC: PRN if symptoms worsen or return  NOTE: Conversation had with patient discussing surgical versus conservative treatment options.  At this time patient declines referral to surgeon for further eval. and surgical treatment options.  If status changes patient will up date me.        Leah Menard-Neumann FNP [facility]  Procedure Note:   None    Time Based Billing   Total Time Spent with Patient: 30 minutes .  Visit Start Time: 1200  10 minutes spent prior to visit reviewing EMR, prior labs and x-rays.  10 minutes spent in visit with patient face-to-face time completing exam, obtaining history, educating on DX and treatment plan.  10 minutes spent after visit completing EMR  documentation.   Visit End Time: 5971

## 2023-12-08 ENCOUNTER — PROCEDURE VISIT (OUTPATIENT)
Dept: ORTHOPEDICS | Facility: CLINIC | Age: 63
End: 2023-12-08
Payer: MEDICAID

## 2023-12-08 DIAGNOSIS — M17.0 PRIMARY OSTEOARTHRITIS OF BOTH KNEES: Primary | ICD-10-CM

## 2023-12-08 PROCEDURE — 99214 OFFICE O/P EST MOD 30 MIN: CPT | Mod: 25,S$PBB,, | Performed by: NURSE PRACTITIONER

## 2023-12-08 PROCEDURE — 20610 DRAIN/INJ JOINT/BURSA W/O US: CPT | Mod: 50,PBBFAC | Performed by: NURSE PRACTITIONER

## 2023-12-08 PROCEDURE — 99214 PR OFFICE/OUTPT VISIT, EST, LEVL IV, 30-39 MIN: ICD-10-PCS | Mod: 25,S$PBB,, | Performed by: NURSE PRACTITIONER

## 2023-12-08 PROCEDURE — 20610 LARGE JOINT ASPIRATION/INJECTION: BILATERAL KNEE: ICD-10-PCS | Mod: 50,S$PBB,, | Performed by: NURSE PRACTITIONER

## 2023-12-08 RX ORDER — TRIAMCINOLONE ACETONIDE 40 MG/ML
40 INJECTION, SUSPENSION INTRA-ARTICULAR; INTRAMUSCULAR
Status: COMPLETED | OUTPATIENT
Start: 2023-12-08 | End: 2023-12-08

## 2023-12-08 RX ORDER — LIDOCAINE HYDROCHLORIDE 10 MG/ML
5 INJECTION, SOLUTION EPIDURAL; INFILTRATION; INTRACAUDAL; PERINEURAL
Status: COMPLETED | OUTPATIENT
Start: 2023-12-08 | End: 2023-12-08

## 2023-12-08 RX ADMIN — LIDOCAINE HYDROCHLORIDE 5 ML: 10 INJECTION, SOLUTION EPIDURAL; INFILTRATION; INTRACAUDAL; PERINEURAL at 08:12

## 2023-12-08 RX ADMIN — TRIAMCINOLONE ACETONIDE 40 MG: 40 INJECTION, SUSPENSION INTRA-ARTICULAR; INTRAMUSCULAR at 08:12

## 2023-12-08 NOTE — PROCEDURES
"  Subjective:   PATIENT ID: Paola Cm is a 63 y.o. female. Non-smoker. Employment HX: , currently retired.    Seen [facility] ortho for same DX since n/a.   CHIEF COMPLAINT: No chief complaint on file.    HPI:    Bilateral L < R aching medial knee pain.   Injury: no known injury  Onset: several years ago fluctuates   Modifying Factors: worse with activity, improves with rest, stiffness after immobilization, and improves with less than 30 minutes activity  Associated Symptoms: crepitus, decreased ROM, and "giving out"   Activity: sedentary with light activity and pain moderately interferes with ADLs   Previous Treatments:  BMI reduction ongoing education, HEP withTheraBand, RX NSAIDs, and CSI since years ago last injection years ago  with some relief but symptoms returning  PMH: + DM   Family History: + OA    Note:  Follow-up for bilateral severe knee DJD.  Patient received CSI years ago with some relief.  At prior visit 11/13/2023 patient declined CSI.  Requested DMV handicap license Plate form to be filled out.  Since then patient pain has increased and has returned to clinic today requesting CSI due to symptoms affecting ADLs.  Patient did receive DMV form which was filled out between visits.      Current Outpatient Medications:     atorvastatin (LIPITOR) 80 MG tablet, Take 1 tablet (80 mg total) by mouth once daily. (Patient not taking: Reported on 11/13/2023), Disp: 90 tablet, Rfl: 3    blood pressure monitor Kit, 1 each by Misc.(Non-Drug; Combo Route) route 2 (two) times daily as needed (blood pressure). (Patient not taking: Reported on 11/13/2023), Disp: 1 each, Rfl: 0    empagliflozin (JARDIANCE) 25 mg tablet, Take 1 tablet (25 mg total) by mouth once daily., Disp: 90 tablet, Rfl: 3    metFORMIN (GLUCOPHAGE) 500 MG tablet, Take 1 tablet (500 mg total) by mouth 2 (two) times daily with meals., Disp: 180 tablet, Rfl: 3    NIFEdipine (PROCARDIA-XL) 60 MG (OSM) 24 hr tablet, Take 1 tablet (60 mg " total) by mouth once daily. (Patient not taking: Reported on 11/13/2023), Disp: 90 tablet, Rfl: 3    semaglutide (RYBELSUS) 3 mg tablet, Take 1 tablet (3 mg total) by mouth once daily. (Patient not taking: Reported on 11/13/2023), Disp: 30 tablet, Rfl: 0    [START ON 12/9/2023] semaglutide (RYBELSUS) 7 mg tablet, Take 1 tablet (7 mg total) by mouth once daily. (Patient not taking: Reported on 11/13/2023), Disp: 30 tablet, Rfl: 5    TRUE METRIX GLUCOSE TEST STRIP Strp, USE TO CHECK BLOOD SUGARS DAILY, Disp: , Rfl:     valsartan (DIOVAN) 160 MG tablet, Take 1 tablet (160 mg total) by mouth once daily., Disp: 90 tablet, Rfl: 3    Current Facility-Administered Medications:     LIDOcaine (PF) 10 mg/ml (1%) injection 50 mg, 5 mL, Other, 1 time in Clinic/[facility], Brenda Perez NP    LIDOcaine (PF) 10 mg/ml (1%) injection 50 mg, 5 mL, Other, 1 time in Clinic/[facility], Brenda Perez NP    triamcinolone acetonide injection 40 mg, 40 mg, Intra-articular, 1 time in Clinic/[facility], Brenda Perez NP    triamcinolone acetonide injection 40 mg, 40 mg, Intra-articular, 1 time in Clinic/[facility], Brenda Perez, NP  Review of patient's allergies indicates:   Allergen Reactions    Latex Itching     Hemoglobin A1c   Date Value Ref Range Status   11/08/2023 13.1 (H) <=7.0 % Final   10/10/2023 13.0 (H) <=7.0 % Final   05/04/2023 12.3 (H) <=7.0 % Final      There is no height or weight on file to calculate BMI.   There were no vitals filed for this visit.     REVIEW OF SYSTEMS:  A ten-point review of systems was performed and is negative, except as mentioned above   Objective:   MSK Bilateral Knee  General:  No apparent distress, no pain indicators, obesity   Inspection: limping gait, mild effusion, full weight bearing, no erythema, no contusion, mild quad deconditioning, varus deformity   Palpation: BILATERAL knees tenderness with palpation at medial joint line, peripatellar soft tissue, non-tender: patellar tendon,  tibial plateau  ROM:    Active Flexion / Extension (0-140):  0 / 116 painful (R)  1 / 118 painful (L)  Strength:   Flexion     4 / 5 (R)  4 / 5 (L)  Extension     4 / 5 (R)  4 / 5 (L)  Special Testing:  IT Band Testing  Praveena's Test:  -- (R)  -- (L)  Effusion Testing  Ballotable Effusion:   -- (R)  -- (L)  Fluid Wave:    -- (R)  -- (L)  Patellar Testing  Patellar Grind:    + (R)  + (L)  Patellar Facet Pain:   + (R)  + (L)  Apprehension:    -- (R)  -- (L)  Meniscal Testing  Steph's test:    + (R)  + (L)  Thessaly's Test:    Not Tested (R)  Not Tested (L)  Ligament Testing  ACL Anterior Drawer:   -- (R) -- (L)  PCL Posterior Drawer:   -- (R) -- (L)  LCL Varus Test:    -- (R) -- (L)  MCL Valgus Test:    -- (R) -- (L)  Hip Exam normal  Ankle Exam normal  Neurovascular: Intact to light touch  Neuro/ Psych: Awake, alert, oriented, normal mood and affect  Lymphatic: No LAD  Skin/ Soft Tissue: no rash, skin intact  Assessment:   IMAGING: X-ray 4 views of right and left knee dated 7/11/23 reviewed and independently interpreted by me.  Discussed with patient. Noted no acute, DJD noted.    XR KNEE COMP 4 OR MORE VIEWS RIGHT 7/11/23  CLINICAL HISTORY:  Pain in right knee  COMPARISON:  None.  FINDINGS:  No acute displaced fractures or dislocations.  There is complete obliteration of the medial compartment of the knee joint with sclerosis marginal osteophytosis and subchondral cyst formation degenerative changes also identified of the lateral compartment and the patellofemoral joint articular spaces are otherwise preserved with smooth articular surfaces on the standing projection there are significant degenerative changes of the contralateral knee  No blastic or lytic lesions.  Soft tissues within normal limits.  Impression:  Tricompartmental degenerative changes.  Degenerative changes of the contralateral knee.    XR KNEE COMP 4 OR MORE VIEWS LEFT 7/11/23  CLINICAL HISTORY:  Pain in left knee  COMPARISON:  None.  FINDINGS:  No  acute displaced fractures or dislocations.  There is some most complete obliteration of the medial compartment of the knee joint with sclerosis marginal osteophytosis and subchondral cyst formation degenerative changes also identified of the lateral compartment and the patellofemoral joint articular spaces otherwise preserved with smooth articular surfaces  No blastic or lytic lesions.  Soft tissues within normal limits.  Impression:  Tricompartmental degenerative changes.    EMR REVIEW: completed with noted prior visit 11/13/23 reviewed.    DIAGNOSIS:  1. Primary osteoarthritis of both knees    2. BMI 30.0-30.9,adult      Plan:     Orders Placed This Encounter    Large Joint Aspiration/Injection    triamcinolone acetonide injection 40 mg    triamcinolone acetonide injection 40 mg    LIDOcaine (PF) 10 mg/ml (1%) injection 50 mg    LIDOcaine (PF) 10 mg/ml (1%) injection 50 mg     Ongoing education about DX and treatment recommendations including conservative treatments of daily HEP with TheraBand, BMI reduction goal 5-10% of body weight (140# overall goal for BMI <40), muscle strengthening with use of stationary bike (RPM set at 80 or > with slow progression to goal of 40 minutes 3-4 times per week as tolerated), adequate vit D/C, glucosamine 1500 mg/day and daily acetaminophen 1000 mg 3 times/ day if able to tolerate.    Treatment plan: Moderate exacerbation of chronic Bilateral R = L End stage knee arthritis Patient reports not interested in injections or surgical options at this time.  Patient has changed her mind about CSI, would like CSI today due to symptoms affecting ADLs.  Recommend patient continue with home exercises, BMI reduction, and medications as prescribed by PCP.  We will provide CSI today and have patient return to clinic in future if further injections desire.  Agrees to plan and verbalized understanding.  Procedure: CSI v. VS injections discussed, s/t failed conservative treatments patient  consents to CSI today   RX Medications: continue medications as RX per PCP.  RTC: PRN if symptoms worsen or return  NOTE: Conversation had with patient discussing surgical versus conservative treatment options.  At this time patient declines referral to surgeon for further eval. and surgical treatment options.  If status changes patient will up date me.        Brenda LO  [facility]  Procedure Note:   Large Joint Aspiration/Injection: bilateral knee    Date/Time: 12/8/2023 8:50 AM    Performed by: Brenda Perez NP  Authorized by: Brenda Perez NP    Location:  Knee  Laterality:  Bilateral  Site:  Bilateral knee  Medications (Right):  5 mL LIDOCAINE 1% (PF) 50 mg / 5 mL; 40 mg KENALOG 40 mg / 1 mL  Medications (Left):  5 mL LIDOCAINE 1% (PF) 50 mg / 5 mL; 40 mg KENALOG 40 mg / 1 mL     Ortho Procedure Note   Procedure: BILATERAL Knee CSI lateral suprapatellar approach     Indications: Therapeutic Indication - Decrease pain, Increase range of motion and improve quality of life:   Risks:  Possible complications with the injection include bleeding, infection (.01%), tendon rupture, steroid flare, fat pad or soft tissue atrophy, skin depigmentation, allergic reaction to medications and vasovagal response. (steroid flare treatment is rest, ice, NSAIDs and resolves in 24-36 hours)  Consent:  Procedure, risks, benefits, and alternatives explained the patient, who voiced understanding and agreed to proceed with procedure.  Consent signed and scanned into the medical record.   No absolute contraindications (cellulitis overlying joint, infection, lack of informed consent, allergy to injection medication, AVN protein or egg allergy for sodium hyaluronate, or history of steroid flare) or relative contraindications (uncontrolled DM2 A1c>10, coagulopathy, INR > 3.5, previous joint replacement or history of AVN).        Staff: Brenda LO  Description:   Time-out performed.  The patient was prepped in normal sterile fashion use of chlorhexidine scrub and the appropriate and anatomic landmarks were identified.  Sterile 21 gauge 1.5 inch needle was used. Topical ethyl chloride was applied. Contents of syringe included:     RIGHT KNEE: 5 ml of 1% of lidocaine (PF) with 40 mg of Kenalog    LEFT KNEE: 5 ml of 1% of lidocaine (PF) with 40 mg of Kenalog      Drainage: n/a   Complications: None   EBL: None   Post Procedure: Patient alert, and moving all extremities. ROM improved, pain decreased.  Good peripheral pulses, no signs of vascular compromise and range of motion intact.  Aftercare instructions were given to patient at time of discharge.  Relative rest for 3 days-avoiding excess activity.  Place ice on the area for 15 minutes every 4-6 hours. Patient may take Tylenol a 1000 mg b.i.d. or ibuprofen 600 mg t.i.d. for the next 3-4 days if not on medication already and safe to take pending co-morbidities.  Protect the area for the next 1-8 hours if anesthetic was used.  Avoid excessive activity for the next 3-4 weeks.  ER precautions given for fever, severe joint pain or allergic reaction or other new symptoms related to the joint injection.         Time Based Billing   None

## 2023-12-13 ENCOUNTER — TELEPHONE (OUTPATIENT)
Dept: FAMILY MEDICINE | Facility: CLINIC | Age: 63
End: 2023-12-13
Payer: MEDICAID

## 2023-12-13 VITALS — DIASTOLIC BLOOD PRESSURE: 86 MMHG | SYSTOLIC BLOOD PRESSURE: 138 MMHG

## 2023-12-13 DIAGNOSIS — I10 PRIMARY HYPERTENSION: Primary | ICD-10-CM

## 2023-12-13 NOTE — TELEPHONE ENCOUNTER
Spoke to patient over the phone, patient id self with accurate date of birth.  Patient state she is taking her medication nifedipine 60 mg p.o. once daily and Diovan 160 mg.  Patient has an appointment on December 15, 2023.  Continue to monitor blood pressure at home.  Continue taking medication as discussed.  Questions solicited and answered, patient verbalized and agreed to plan

## 2023-12-19 ENCOUNTER — TELEPHONE (OUTPATIENT)
Dept: OPHTHALMOLOGY | Facility: CLINIC | Age: 63
End: 2023-12-19
Payer: MEDICAID

## 2023-12-19 NOTE — TELEPHONE ENCOUNTER
Pre procedure day call..Called and spoke with the patient, verified birthday, asked if the patient would be coming to their appointment and if they have any questions or concerns that I could answer for them. Patient stated that they would be at the appointment and that they have no questions at this time.

## 2023-12-20 ENCOUNTER — CLINICAL SUPPORT (OUTPATIENT)
Dept: OPHTHALMOLOGY | Facility: CLINIC | Age: 63
End: 2023-12-20
Payer: MEDICAID

## 2023-12-20 VITALS — WEIGHT: 159.19 LBS | BODY MASS INDEX: 30.06 KG/M2 | HEIGHT: 61 IN

## 2023-12-20 DIAGNOSIS — E11.311 DIABETIC MACULAR EDEMA WITH RETINOPATHY ASSOCIATED WITH TYPE 2 DIABETES MELLITUS: Primary | ICD-10-CM

## 2023-12-20 PROCEDURE — 99213 OFFICE O/P EST LOW 20 MIN: CPT | Mod: PBBFAC,PN | Performed by: STUDENT IN AN ORGANIZED HEALTH CARE EDUCATION/TRAINING PROGRAM

## 2023-12-20 RX ORDER — PHENYLEPH/TROPICAMIDE IN WATER 2.5 %-1 %
1 DROPS OPHTHALMIC (EYE) ONCE
Status: COMPLETED | OUTPATIENT
Start: 2023-12-20 | End: 2023-12-20

## 2023-12-20 RX ADMIN — Medication 1 DROP: at 09:12

## 2023-12-20 NOTE — PROGRESS NOTES
Assessment/Plan    1. TRD/PDR s/p OD 25g PPV/MP/EL/FAx/SF6 06/2022  Likely active PDR  - Referred from [facility]  - A1C 13.1 11/2023  - LTFU until 11/2023. Patient with active PDR nasally. Vision dec to CF however vision 20/60 with PAUL -- severe dryness and 3+ NSC likely contributing  - questionable NV vs. LETITIA nasally, appears improved from prior fundus photos  - ok to monitor without treatment    2. Active PDR, OS  - NV diffusely nasally, traction membranes along superior and inferior arcades  - very sparse PRP with much room for fill in  - Patient declined PRP in clinic 11/2023 -- poor candidate for anti-vegf therapy due to membranes  - slightly worsening area of temporal macular edema; recommend focal laser temporally left eye prior to PRP  - rtc in next few weeks for focal laser vs. PRP    3. Cataracts OU  - VS OD > OS  - address once above has stabilized   -12/20/23: MRX provided for DMV eval, BCVA 20/200 OD and 20/50 OS    Can be seen in [place] for further laser tx

## 2024-01-11 ENCOUNTER — TELEPHONE (OUTPATIENT)
Dept: FAMILY MEDICINE | Facility: CLINIC | Age: 64
End: 2024-01-11
Payer: MEDICAID

## 2024-01-15 PROBLEM — Z13.9 ENCOUNTER FOR SCREENING INVOLVING SOCIAL DETERMINANTS OF HEALTH (SDOH): Status: RESOLVED | Noted: 2023-10-10 | Resolved: 2024-01-15

## 2024-02-02 ENCOUNTER — TELEPHONE (OUTPATIENT)
Dept: FAMILY MEDICINE | Facility: CLINIC | Age: 64
End: 2024-02-02
Payer: MEDICAID

## 2024-02-02 DIAGNOSIS — E11.65 TYPE 2 DIABETES MELLITUS WITH HYPERGLYCEMIA, WITHOUT LONG-TERM CURRENT USE OF INSULIN: Primary | ICD-10-CM

## 2024-02-02 RX ORDER — METFORMIN HYDROCHLORIDE 500 MG/1
500 TABLET ORAL 2 TIMES DAILY WITH MEALS
Qty: 180 TABLET | Refills: 0 | Status: SHIPPED | OUTPATIENT
Start: 2024-02-02 | End: 2024-04-03 | Stop reason: SDDI

## 2024-02-08 ENCOUNTER — TELEPHONE (OUTPATIENT)
Dept: FAMILY MEDICINE | Facility: CLINIC | Age: 64
End: 2024-02-08
Payer: MEDICAID

## 2024-02-08 NOTE — TELEPHONE ENCOUNTER
Patient needs appointment    ----- Message from Barbara Peres sent at 2/8/2024  3:12 PM CST -----  Regarding: Refill  Provider: WALLY Coreas  Preferred Pharmacy: [facility]  Last Visit:  Next Visit: 2/16/2024  Patient's Contact Number:    1. Name of Medication: True Metrix Test Strip  Dosage:  Comments:    2. Name of Medication:  Dosage:  Comments:    3. Name of Medication:  Dosage:  Comments    4. Name of Medication:  Dosage:  Comments:    5. Name of Medication:  Dosage:  Comments:

## 2024-02-08 NOTE — TELEPHONE ENCOUNTER
Patient needs to attend up coming appointment.     ----- Message from Nikky Connor sent at 2/8/2024 12:45 PM CST -----  TRUE METRIX GLUCOSE TEST STRIP Strp  She also needs the monitor to check her sugar as well called in.   [place], [place] - [address] [facility]

## 2024-02-12 PROBLEM — Z13.220 ENCOUNTER FOR LIPID SCREENING FOR CARDIOVASCULAR DISEASE: Status: RESOLVED | Noted: 2023-11-08 | Resolved: 2024-02-12

## 2024-02-12 PROBLEM — Z13.6 ENCOUNTER FOR LIPID SCREENING FOR CARDIOVASCULAR DISEASE: Status: RESOLVED | Noted: 2023-11-08 | Resolved: 2024-02-12

## 2024-03-27 ENCOUNTER — OFFICE VISIT (OUTPATIENT)
Dept: ORTHOPEDICS | Facility: CLINIC | Age: 64
End: 2024-03-27
Payer: MEDICAID

## 2024-03-27 VITALS
DIASTOLIC BLOOD PRESSURE: 130 MMHG | HEIGHT: 61 IN | SYSTOLIC BLOOD PRESSURE: 213 MMHG | TEMPERATURE: 97 F | WEIGHT: 158.94 LBS | HEART RATE: 66 BPM | BODY MASS INDEX: 30.01 KG/M2

## 2024-03-27 DIAGNOSIS — M17.0 PRIMARY OSTEOARTHRITIS OF BOTH KNEES: Primary | ICD-10-CM

## 2024-03-27 PROCEDURE — 1159F MED LIST DOCD IN RCRD: CPT | Mod: CPTII,,, | Performed by: NURSE PRACTITIONER

## 2024-03-27 PROCEDURE — 3008F BODY MASS INDEX DOCD: CPT | Mod: CPTII,,, | Performed by: NURSE PRACTITIONER

## 2024-03-27 PROCEDURE — 1160F RVW MEDS BY RX/DR IN RCRD: CPT | Mod: CPTII,,, | Performed by: NURSE PRACTITIONER

## 2024-03-27 PROCEDURE — 99214 OFFICE O/P EST MOD 30 MIN: CPT | Mod: S$PBB,,, | Performed by: NURSE PRACTITIONER

## 2024-03-27 PROCEDURE — 99214 OFFICE O/P EST MOD 30 MIN: CPT | Mod: PBBFAC | Performed by: NURSE PRACTITIONER

## 2024-03-27 PROCEDURE — 3077F SYST BP >= 140 MM HG: CPT | Mod: CPTII,,, | Performed by: NURSE PRACTITIONER

## 2024-03-27 PROCEDURE — 3080F DIAST BP >= 90 MM HG: CPT | Mod: CPTII,,, | Performed by: NURSE PRACTITIONER

## 2024-03-27 NOTE — PROGRESS NOTES
"  Subjective:   PATIENT ID: Paola Cm is a 63 y.o. female. Non-smoker. Employment HX: , currently retired.    Seen [facility] for same DX since 2023.   CHIEF COMPLAINT: Knee Pain of the Right Knee (F/U Bilat Knee pain. Denies pain) and Knee Pain of the Left Knee    HPI:    Bilateral L < R stiffness and aching medial knee pain.   Injury/ Surgical HX r/t CC: no history of significant injuries or previous surgeries  Onset: several years ago fluctuates   Modifying Factors: exacerbated by increased activity, prolonged sitting, prolonged walking/ standing, improved with movement within > 30 minutes , rest, cold compress  Associated Symptoms: denies locking and/or catching, denies "popping," endorses swelling,   endorses limited ROM, endorses "giving out," denies falls, endorses difficult sleeping s/t pain  Activity: sedentary with light activity and pain moderately interferes with ADLs   Previous Treatments:  BMI reduction ongoing education, HEP withTheraBand, OTC pain relievers: Tylenol, and CSI since 2023 last injection 12/8/23  with adequate relief but symptoms returning  PMH: + DM   Family History: + OA    NOTE: Follow up for bilateral knee DJD.  Conservative treatments providing adequate relief at this time and does not desire surgical treatment options at this time.  CSI given 12/8/23 with adequate relief, lasting 2.5-3 months.  Symptoms returning recently over past few weeks affecting ADLs.  Requesting CSI today for symptom relief.   Patient with noted elevated BP, reports she does not take her RX medications as recommended by her PCP, states she believes they aren't necessary and she does not want to take any more medications.      Current Outpatient Medications:     atorvastatin (LIPITOR) 80 MG tablet, Take 1 tablet (80 mg total) by mouth once daily. (Patient not taking: Reported on 11/13/2023), Disp: 90 tablet, Rfl: 3    blood pressure monitor Kit, 1 each by Misc.(Non-Drug; Combo Route) route 2 " "(two) times daily as needed (blood pressure). (Patient not taking: Reported on 11/13/2023), Disp: 1 each, Rfl: 0    empagliflozin (JARDIANCE) 25 mg tablet, Take 1 tablet (25 mg total) by mouth once daily. (Patient not taking: Reported on 3/27/2024), Disp: 90 tablet, Rfl: 3    metFORMIN (GLUCOPHAGE) 500 MG tablet, Take 1 tablet (500 mg total) by mouth 2 (two) times daily with meals. (Patient not taking: Reported on 3/27/2024), Disp: 180 tablet, Rfl: 0    NIFEdipine (PROCARDIA-XL) 60 MG (OSM) 24 hr tablet, Take 1 tablet (60 mg total) by mouth once daily. (Patient not taking: Reported on 11/13/2023), Disp: 90 tablet, Rfl: 3    semaglutide (RYBELSUS) 7 mg tablet, Take 1 tablet (7 mg total) by mouth once daily. (Patient not taking: Reported on 11/13/2023), Disp: 30 tablet, Rfl: 5    TRUE METRIX GLUCOSE TEST STRIP Strp, USE TO CHECK BLOOD SUGARS DAILY, Disp: , Rfl:     valsartan (DIOVAN) 160 MG tablet, Take 1 tablet (160 mg total) by mouth once daily. (Patient not taking: Reported on 3/27/2024), Disp: 90 tablet, Rfl: 3  Review of patient's allergies indicates:   Allergen Reactions    Latex Itching     REVIEW OF SYSTEMS:  A ten-point review of systems was performed and is negative, except as mentioned above   Objective:   Body mass index is 30.03 kg/m².   Vitals:    03/27/24 1113 03/27/24 1122   BP: (!) 211/128 (!) 213/130   Pulse: 66    Temp: 97.4 °F (36.3 °C)    TempSrc: Oral    Weight: 72.1 kg (158 lb 15.2 oz)    Height: 5' 1" (1.549 m)      MSK Knee Exam  General:  no apparent distress, no pain indicators,  well nourished  Inspection:   lower extremities in proportion with overall body habitus, no erythema   , noted varicose veins, limping gait w/ full weight bearing   LEFT KNEE RIGHT KNEE   mild knee effusion, noted varus deformity, no contusion, no masses, no scars mild knee effusion, noted varus deformity, no contusion, no masses, no scars   Palpation:     LEFT KNEE RIGHT KNEE   normal temperature, noted tenderness " over the medial joint line, noted patellar grind with patella compression and noted patellar facet pain, noted  crepitus, mild quad deconditioning, no active mechanical locking noted w/ joint movement,  no tenderness of patellar tendon or tibial plateau, no fullness noted to popliteal bursa  normal temperature, noted tenderness over the medial joint line, noted patellar grind with patella compression and noted patellar facet pain, noted  crepitus, mild quad deconditioning, no active mechanical locking noted w/ joint movement,  no tenderness of patellar tendon or tibial plateau, no fullness noted to popliteal bursa      ROM Active Flexion / Extension (0-140)  Left 2 / 116 w/ pain Right 1 / 114 w/ pain   Strength Flexion / Extension (5 / 5)  Left 4 / 4 Right 4 / 4     Special Testing:         IT Band Syndrome L+ L-- R+ R-- Not Tested    Praveena's Test [] [x] [] [x] []    Joint Effusion         Ballotable Effusion [] [x] [] [x] []    Fluid Wave [] [x] [] [x] []    Patellar Testing         Apprehension [] [x] [] [x] []    Meniscal Injury         Steph's Test [x] [] [x] [] []    Thessaly's Test [] [] [] [] [x]    Ligament Injury         ACL Anterior Drawer [] [x] [] [x] []    PCL Posterior Drawer [] [x] [] [x] []    LCL Varus Test [] [x] [] [x] []    MCL Valgus Test [] [x] [] [x] []      Hip Exam normal  Ankle Exam normal  Neurovascular: Intact to light touch  Neuro/ Psych: Awake, alert, oriented, normal mood and affect  Lymphatic: No LAD  Assessment:   IMAGING: XR noted in EMR dated 7/11/23  4 views of bilateral knee reviewed and independently interpreted by me with noted no acute findings noted, findings suggestive of severe arthritic changes , complete loss of bilateral medial joint space.  Radiologist findings reviewed.  Findings discussed with patient today.    MRI/CT: none    LABS:   Hemoglobin A1c   Date Value Ref Range Status   11/08/2023 13.1 (H) <=7.0 % Final   10/10/2023 13.0 (H) <=7.0 % Final   05/04/2023  12.3 (H) <=7.0 % Final     EMR REVIEW: completed with noted prior visit 12/8/23 reviewed.  Diagnosis & Treatment Plan:   DIAGNOSIS: Moderate exacerbation of chronic Bilateral R = L End stage knee arthritis, complicated by uncontrolled DM with elevated A1C > 8 and uncontrolled HTN with elevated BP of 213/130 today in clinic     1. Primary osteoarthritis of both knees    2. BMI 30.0-30.9,adult      TREATMENT PLAN:     Treatment plan:   Discussed and educated patient on contraindication related to CSI with DM and HTN.  Patient instructed to go directly to ER for treatment related to HTN ASAP due to severely elevated BP.  Stressed importance for patient to adhere to PCP treatment plans r/t to these conditions in order to avoid life threatening events and improve her overall health and life. Patient verbalized understanding and denies questions. Agreed to going to ED upon leaving clinic today.        Ongoing education about DX and treatment recommendations include conservative treatments of daily HEP with TheraBand, BMI reduction goal 5-10% of body weight, muscle strengthening with use of stationary bike (RPM set at 80 or > with slow progression to goal of 40 minutes 3-4 times per week as tolerated), adequate vit D/C, glucosamine 1500 mg/day and/or daily acetaminophen 1000 mg 3 times/ day if able to tolerate.    Procedure:  unable to offer CSI today due to elevated HTN and A1C  RX Medications: continue medications as RX per PCP.  RTC: PRN if symptoms worsen or return and after seen by PCP and HTN/A1C within acceptable range for injections.    NOTE: None        Leah Menard-Neumann FNP [facility]  Procedure Note:   None    Time Based Billing   Total Time Spent with Patient: 30 minutes .  Visit Start Time: 1200  10 minutes spent prior to visit reviewing EMR, prior labs and x-rays.  10 minutes spent in visit with patient face-to-face time completing exam, obtaining history, educating on DX  and treatment plan.  10 minutes spent after visit completing EMR documentation.   Visit End Time: 1230     Please be aware that this note has been generated with the assistance of MModal voice-to-text.  Please excuse any spelling or grammatical errors.

## 2024-03-27 NOTE — PROGRESS NOTES
Pts. B/P 211/128 . Pt says she has not been taking her meds. When ask why she wasn't taking meds, pt. Says that she was trusting God. Pt. Told that she needed to schedule a sooner Appt then 05/16 with her PCP  and that she needed to be seen inER today , because B/P was too high. Pt called her PCP's office and Appt Made for 04/03/24. Pt also told to go to ER. , verbalizes understanding.

## 2024-04-03 ENCOUNTER — OFFICE VISIT (OUTPATIENT)
Dept: FAMILY MEDICINE | Facility: CLINIC | Age: 64
End: 2024-04-03
Payer: MEDICAID

## 2024-04-03 VITALS
BODY MASS INDEX: 30.96 KG/M2 | WEIGHT: 164 LBS | OXYGEN SATURATION: 99 % | TEMPERATURE: 98 F | SYSTOLIC BLOOD PRESSURE: 182 MMHG | HEIGHT: 61 IN | DIASTOLIC BLOOD PRESSURE: 116 MMHG | RESPIRATION RATE: 18 BRPM | HEART RATE: 89 BPM

## 2024-04-03 DIAGNOSIS — E11.65 TYPE 2 DIABETES MELLITUS WITH HYPERGLYCEMIA, WITHOUT LONG-TERM CURRENT USE OF INSULIN: Primary | ICD-10-CM

## 2024-04-03 DIAGNOSIS — I10 PRIMARY HYPERTENSION: ICD-10-CM

## 2024-04-03 DIAGNOSIS — Z12.11 ENCOUNTER FOR COLORECTAL CANCER SCREENING: ICD-10-CM

## 2024-04-03 DIAGNOSIS — Z12.31 ENCOUNTER FOR SCREENING MAMMOGRAM FOR BREAST CANCER: ICD-10-CM

## 2024-04-03 DIAGNOSIS — E78.2 MIXED HYPERLIPIDEMIA: ICD-10-CM

## 2024-04-03 DIAGNOSIS — Z12.12 ENCOUNTER FOR COLORECTAL CANCER SCREENING: ICD-10-CM

## 2024-04-03 LAB
ANION GAP SERPL CALC-SCNC: 9 MEQ/L
BUN SERPL-MCNC: 14.7 MG/DL (ref 9.8–20.1)
CALCIUM SERPL-MCNC: 10.4 MG/DL (ref 8.4–10.2)
CHLORIDE SERPL-SCNC: 101 MMOL/L (ref 98–107)
CO2 SERPL-SCNC: 27 MMOL/L (ref 23–31)
CREAT SERPL-MCNC: 1.15 MG/DL (ref 0.55–1.02)
CREAT UR-MCNC: 54.1 MG/DL (ref 45–106)
CREAT/UREA NIT SERPL: 13
EST. AVERAGE GLUCOSE BLD GHB EST-MCNC: ABNORMAL MG/DL
GFR SERPLBLD CREATININE-BSD FMLA CKD-EPI: 54 MLS/MIN/1.73/M2
GLUCOSE SERPL-MCNC: 379 MG/DL (ref 70–110)
GLUCOSE SERPL-MCNC: 387 MG/DL (ref 82–115)
HBA1C MFR BLD: >14 %
MICROALBUMIN UR-MCNC: 122.1 UG/ML
MICROALBUMIN/CREAT RATIO PNL UR: 225.7 MG/GM CR (ref 0–30)
POTASSIUM SERPL-SCNC: 3.7 MMOL/L (ref 3.5–5.1)
SODIUM SERPL-SCNC: 137 MMOL/L (ref 136–145)

## 2024-04-03 PROCEDURE — 1160F RVW MEDS BY RX/DR IN RCRD: CPT | Mod: CPTII,,, | Performed by: NURSE PRACTITIONER

## 2024-04-03 PROCEDURE — 99214 OFFICE O/P EST MOD 30 MIN: CPT | Mod: PBBFAC | Performed by: NURSE PRACTITIONER

## 2024-04-03 PROCEDURE — 82043 UR ALBUMIN QUANTITATIVE: CPT | Performed by: NURSE PRACTITIONER

## 2024-04-03 PROCEDURE — 3008F BODY MASS INDEX DOCD: CPT | Mod: CPTII,,, | Performed by: NURSE PRACTITIONER

## 2024-04-03 PROCEDURE — 99214 OFFICE O/P EST MOD 30 MIN: CPT | Mod: S$PBB,,, | Performed by: NURSE PRACTITIONER

## 2024-04-03 PROCEDURE — 4010F ACE/ARB THERAPY RXD/TAKEN: CPT | Mod: CPTII,,, | Performed by: NURSE PRACTITIONER

## 2024-04-03 PROCEDURE — 82962 GLUCOSE BLOOD TEST: CPT | Mod: PBBFAC | Performed by: NURSE PRACTITIONER

## 2024-04-03 PROCEDURE — 3046F HEMOGLOBIN A1C LEVEL >9.0%: CPT | Mod: CPTII,,, | Performed by: NURSE PRACTITIONER

## 2024-04-03 PROCEDURE — 83036 HEMOGLOBIN GLYCOSYLATED A1C: CPT | Performed by: NURSE PRACTITIONER

## 2024-04-03 PROCEDURE — 3077F SYST BP >= 140 MM HG: CPT | Mod: CPTII,,, | Performed by: NURSE PRACTITIONER

## 2024-04-03 PROCEDURE — 3060F POS MICROALBUMINURIA REV: CPT | Mod: CPTII,,, | Performed by: NURSE PRACTITIONER

## 2024-04-03 PROCEDURE — 3080F DIAST BP >= 90 MM HG: CPT | Mod: CPTII,,, | Performed by: NURSE PRACTITIONER

## 2024-04-03 PROCEDURE — 3066F NEPHROPATHY DOC TX: CPT | Mod: CPTII,,, | Performed by: NURSE PRACTITIONER

## 2024-04-03 PROCEDURE — 1159F MED LIST DOCD IN RCRD: CPT | Mod: CPTII,,, | Performed by: NURSE PRACTITIONER

## 2024-04-03 PROCEDURE — 36415 COLL VENOUS BLD VENIPUNCTURE: CPT | Performed by: NURSE PRACTITIONER

## 2024-04-03 PROCEDURE — 80048 BASIC METABOLIC PNL TOTAL CA: CPT | Performed by: NURSE PRACTITIONER

## 2024-04-03 RX ORDER — LORATADINE 10 MG/1
10 TABLET ORAL DAILY
COMMUNITY
Start: 2023-12-02 | End: 2024-04-03 | Stop reason: ALTCHOICE

## 2024-04-03 RX ORDER — ACETAMINOPHEN 500 MG
1 TABLET ORAL 2 TIMES DAILY PRN
Qty: 1 EACH | Refills: 0 | Status: SHIPPED | OUTPATIENT
Start: 2024-04-03

## 2024-04-03 RX ORDER — LANCETS
EACH MISCELLANEOUS
Qty: 200 EACH | Refills: 2 | Status: SHIPPED | OUTPATIENT
Start: 2024-04-03

## 2024-04-03 RX ORDER — INSULIN PUMP SYRINGE, 3 ML
EACH MISCELLANEOUS
Qty: 1 EACH | Refills: 0 | Status: SHIPPED | OUTPATIENT
Start: 2024-04-03 | End: 2025-04-03

## 2024-04-03 RX ORDER — TROPICAMIDE 10 MG/ML
1 SOLUTION/ DROPS OPHTHALMIC ONCE
COMMUNITY
Start: 2024-03-14

## 2024-04-03 RX ORDER — DAPAGLIFLOZIN 10 MG/1
10 TABLET, FILM COATED ORAL DAILY
Qty: 90 TABLET | Refills: 3 | Status: SHIPPED | OUTPATIENT
Start: 2024-04-03

## 2024-04-03 RX ORDER — PROPARACAINE HYDROCHLORIDE 5 MG/ML
1 SOLUTION/ DROPS OPHTHALMIC ONCE
COMMUNITY
Start: 2024-03-14

## 2024-04-03 RX ORDER — ATORVASTATIN CALCIUM 80 MG/1
80 TABLET, FILM COATED ORAL DAILY
Qty: 90 TABLET | Refills: 3 | Status: SHIPPED | OUTPATIENT
Start: 2024-04-03 | End: 2025-04-03

## 2024-04-03 RX ORDER — VALSARTAN 80 MG/1
80 TABLET ORAL DAILY
Qty: 30 TABLET | Refills: 11 | Status: SHIPPED | OUTPATIENT
Start: 2024-04-03 | End: 2025-04-03

## 2024-04-03 RX ORDER — AZELASTINE 1 MG/ML
1 SPRAY, METERED NASAL 2 TIMES DAILY
COMMUNITY
Start: 2023-12-02 | End: 2024-04-03 | Stop reason: ALTCHOICE

## 2024-04-03 RX ORDER — PHENYLEPHRINE HYDROCHLORIDE 25 MG/ML
1 SOLUTION/ DROPS OPHTHALMIC ONCE
COMMUNITY
Start: 2024-03-14

## 2024-04-03 NOTE — PROGRESS NOTES
Patient Name: Paola Cm   : 1960  MRN: [id number]     SUBJECTIVE DATA:    CHIEF COMPLAINT:   Paola Cm is a 63 y.o. female who presents to clinic today with Hypertension          HPI:  63-year-old female presents to the clinic to follow-up on diabetes, hypertension.  Noncompliant with medications regiment.  Multiple clinic visits.   Patient state as of today she would like to start medications.  Patient state her kids has been pushing her to start taking medications.        2023  Hypertension:  Uncontrolled.  Patient history of noncompliance with medications.  Patient is always hesitant on trying medications.  Last visit patient had agreed to start on blood pressure medication where she came to realized that she needs to take her medication hypertension management.  Current blood pressure 192/119.  Patient currently take Diovan 80 mg p.o. once daily.  Discussed with patient will increase Diovan to 160 mg p.o. daily and add a 2nd medication nifedipine 60 mg p.o. once daily.  Patient to return in 2 weeks for follow-up or sooner if needed.  Continue with low-salt diet less than 2 g per day and stay physically active and stay hydrated with water. Questions solicited and answered, patient verbalized and agreed to plan.     Diabetes:  Uncontrolled.  Patient has a history of noncompliance with medications regiment.  Patient is always hesitant on trying medications.  Last visit patient had agreed to start on diabetic medication where she came to realize that she needs to take her medications for diabetes management.  Patient currently take metformin 500 mg p.o. b.i.d. and Jardiance 10 mg p.o. once daily.  Hemoglobin A1c pending will notify patient of test results when they become available and manage accordingly.  Patient declined Ozempic or other GLP medications or long acting insulin such as Lantus.  Discussed with patient to continue follow diabetic diet meal planning, stay hydrated with water and  "return to clinic as discussed.  Questions solicited and answered, patient verbalized.     11/08/2023 addendum:  Hemoglobin A1c no improvement at 13%.  Discussed with patient will increase Jardiance to 25 mg p.o. daily and add Rybelsus 3 mg p.o. daily for 30 days, take on empty stomach 30 minutes before 1st meal. and then  2nd prescription Rybelsus 7 mg p.o. daily on empty stomach 30 minutes before 1st meal.  Tight diabetic diet meal planning.  Return to clinic as directed.     Blood type:  Patient requesting to know her blood type.  Blood drawn will notify patient of results.     Mixed hyperlipidemia:  Total cholesterol 305, HDL 37, triglyceride 293, .     The 10-year ASCVD risk score (Keanu TANNER, et al., 2019) is: 64.6%    Values used to calculate the score:      Age: 62 years      Sex: Female      Is Non- : Yes      Diabetic: Yes      Tobacco smoker: No      Systolic Blood Pressure: 192 mmHg      Is BP treated: Yes      HDL Cholesterol: 37 mg/dL      Total Cholesterol: 305 mg/dL   Rx Lipitor 80 mg p.o. nightly.  Stay hydrated with water.  Keep fiber intake 30 g per day if possible.  Questions solicited and answered, patient verbalized and agreed to plan.        Today's clinic visit 04/03/2024:  Patient requesting initiation of blood pressure and diabetes as well cholesterol.  Patient state she is doing this because her kids has been after her take care of herself and to start taking her medications.  Patient state" I am ready".    Hypertension:  Uncontrolled.  Not taking medications as prescribed.  Current blood pressure 182/116.  Asymptomatic.  Patient ready to take blood pressure medication.  Discussed Rx 80 mg p.o. once daily.  Monitor blood pressure at home.  2 week virtual visit.  Questions solicited and answered, patient verbalized and agreed to plan.    Diabetes:  Uncontrolled.  Current hemoglobin A1c greater than 14%.  Not taking medications as prescribed.  Patient " "ready to start taking diabetic medications and to follow diabetic diet meal planning.  Was not able to tolerate metformin in the past due to GI symptoms.  Rx Januvia 25 mg p.o. once daily  Rx Farxiga 10 mg p.o. once daily.  Rx Lipitor 80 mg p.o. once daily.  -2 g a day dietary sodium restriction  -optimize glycemic control (goal A1c is less than 7%)  -control high blood pressure (goal blood pressure is less than 130/80, patient was advised to check blood pressure once or twice a week and bring blood pressure logs to next office visit)  -exercise at least 30 minutes a day, 5 days a week  -maintain healthy weight  -decrease or stop alcohol use  -do not smoke  -stay well hydrated (drink water only, avoid juices, sweet tea, and sodas)  -ask about staying up-to-date on vaccinations (flu vaccine, pneumonia vaccine, hepatitis B vaccine)  -avoid excessive use of NSAIDs (ibuprofen, naproxen, Aleve, Advil, Toradol, Mobic), take Tylenol as needed for headache or mild pain  -take cholesterol lowering medications if prescribed (LDL goal less than 100).  Questions solicited and answered, patient verbalized and agreed to plan.    Care gaps:  Patient agreed to complete mammogram.  Patient agreed to complete Cologuard.    Patient denies chest pain, shortness of breath, dyspnea on exertion, palpitations, peripheral edema, abdominal pain, nausea, vomiting, diarrhea, constipation, fatigue, fever, chills, dysuria,  hematuria, dark stools or bloody stools.      ALLERGIES:   Review of patient's allergies indicates:   Allergen Reactions    Latex Itching         ROS:  Review of Systems   All other systems reviewed and are negative.        OBJECTIVE DATA:  Vital signs  Vitals:    04/03/24 0822 04/03/24 0915   BP: (!) 172/116 (!) 182/116   Pulse: 89    Resp: 18    Temp: 98 °F (36.7 °C)    TempSrc: Oral    SpO2: 99%    Weight: 74.4 kg (164 lb)    Height: 5' 1" (1.549 m)       Body mass index is 30.99 kg/m².    PHYSICAL EXAM:   Physical " Exam  Vitals and nursing note reviewed.   Constitutional:       General: She is awake.      Appearance: Normal appearance. She is well-developed and well-groomed.   HENT:      Head: Normocephalic and atraumatic.      Right Ear: Tympanic membrane, ear canal and external ear normal.      Left Ear: Tympanic membrane, ear canal and external ear normal.      Nose: Nose normal.      Mouth/Throat:      Mouth: Mucous membranes are moist.      Pharynx: Oropharynx is clear.   Eyes:      General: Lids are normal.      Extraocular Movements: Extraocular movements intact.      Conjunctiva/sclera: Conjunctivae normal.      Pupils: Pupils are equal, round, and reactive to light.   Cardiovascular:      Rate and Rhythm: Normal rate and regular rhythm.      Pulses: Normal pulses.      Heart sounds: Normal heart sounds.   Pulmonary:      Effort: Pulmonary effort is normal.      Breath sounds: Normal breath sounds.   Abdominal:      General: Bowel sounds are normal.      Palpations: Abdomen is soft.   Musculoskeletal:         General: Normal range of motion.      Cervical back: Normal range of motion and neck supple.   Skin:     General: Skin is warm and dry.      Capillary Refill: Capillary refill takes less than 2 seconds.   Neurological:      General: No focal deficit present.      Mental Status: She is alert and oriented to person, place, and time. Mental status is at baseline.   Psychiatric:         Mood and Affect: Mood normal.         Behavior: Behavior normal. Behavior is cooperative.         Thought Content: Thought content normal.         Judgment: Judgment normal.          ASSESSMENT/PLAN:  1. Type 2 diabetes mellitus with hyperglycemia, without long-term current use of insulin  Assessment & Plan:   Uncontrolled.  Current hemoglobin A1c greater than 14%.  Not taking medications as prescribed.  Patient ready to start taking diabetic medications and to follow diabetic diet meal planning.  Was not able to tolerate metformin in  the past due to GI symptoms.  Rx Januvia 25 mg p.o. once daily  Rx Farxiga 10 mg p.o. once daily.  Rx Lipitor 80 mg p.o. once daily.  -2 g a day dietary sodium restriction  -optimize glycemic control (goal A1c is less than 7%)  -control high blood pressure (goal blood pressure is less than 130/80, patient was advised to check blood pressure once or twice a week and bring blood pressure logs to next office visit)  -exercise at least 30 minutes a day, 5 days a week  -maintain healthy weight  -decrease or stop alcohol use  -do not smoke  -stay well hydrated (drink water only, avoid juices, sweet tea, and sodas)  -ask about staying up-to-date on vaccinations (flu vaccine, pneumonia vaccine, hepatitis B vaccine)  -avoid excessive use of NSAIDs (ibuprofen, naproxen, Aleve, Advil, Toradol, Mobic), take Tylenol as needed for headache or mild pain  -take cholesterol lowering medications if prescribed (LDL goal less than 100).  Questions solicited and answered, patient verbalized and agreed to plan.    Orders:  -     Hemoglobin A1C  -     Foot Exam Performed  -     Microalbumin/Creatinine Ratio, Urine  -     Basic Metabolic Panel  -     dapagliflozin propanediol (FARXIGA) 10 mg tablet; Take 1 tablet (10 mg total) by mouth once daily.  Dispense: 90 tablet; Refill: 3  -     POCT Glucose, Hand-Held Device  -     SITagliptin phosphate (JANUVIA) 25 MG Tab; Take 1 tablet (25 mg total) by mouth once daily.  Dispense: 90 tablet; Refill: 0  -     blood-glucose meter kit; To check BG 2 times daily, to use with insurance preferred meter  Dispense: 1 each; Refill: 0  -     lancets Misc; To check BG 2 times daily, to use with insurance preferred meter  Dispense: 200 each; Refill: 2  -     blood sugar diagnostic Strp; To check BG 2 times daily, to use with insurance preferred meter  Dispense: 200 each; Refill: 2    2. Primary hypertension  Assessment & Plan:  Uncontrolled.  Not taking medications as prescribed.  Current blood pressure  182/116.  Asymptomatic.  Patient ready to take blood pressure medication.  Discussed Rx 80 mg p.o. once daily.  Monitor blood pressure at home.  2 week virtual visit.  Questions solicited and answered, patient verbalized and agreed to plan.    Orders:  -     Basic Metabolic Panel  -     valsartan (DIOVAN) 80 MG tablet; Take 1 tablet (80 mg total) by mouth once daily.  Dispense: 30 tablet; Refill: 11  -     blood pressure monitor Kit; 1 each by Misc.(Non-Drug; Combo Route) route 2 (two) times daily as needed (blood pressure).  Dispense: 1 each; Refill: 0    3. Mixed hyperlipidemia  Overview:  The patient presents with hyperlipidemia.  The patient reports tolerating the medication well and is in excellent compliance.  There have been no medication side effects.  The patient denies chest pain, neuropathy, and myalgias.  The patient has reduced fat intake and has been exercising.  Current treatment has included the medications listed in the med card.    Lab Results   Component Value Date    CHOL 269 04/20/2022       Lab Results   Component Value Date    HDL 34 04/20/2022     No results found for: LDLCALC    Lab Results   Component Value Date    TRIG 177 04/20/2022     No results found for: CHOLHDL  Lab Results   Component Value Date    ALT 15 02/28/2022    AST 14 02/28/2022    ALKPHOS 167 02/28/2022    BILITOT 0.4 02/28/2022         Assessment & Plan:  Total cholesterol 305, HDL 37, triglyceride 293, .  Rx Lipitor 80 mg p.o. nightly.  Stay hydrated with water.  Keep fiber intake 30 g per day if possible.  Questions solicited and answered, patient verbalized and agreed to plan.  The 10-year ASCVD risk score (Keanu TANNER, et al., 2019) is: 59.1%    Values used to calculate the score:      Age: 63 years      Sex: Female      Is Non- : Yes      Diabetic: Yes      Tobacco smoker: No      Systolic Blood Pressure: 182 mmHg      Is BP treated: Yes      HDL Cholesterol: 37 mg/dL      Total  Cholesterol: 305 mg/dL    Orders:  -     atorvastatin (LIPITOR) 80 MG tablet; Take 1 tablet (80 mg total) by mouth once daily.  Dispense: 90 tablet; Refill: 3    4. Encounter for screening mammogram for breast cancer  Assessment & Plan:  Mammogram referral initiated.    Orders:  -     Mammo Digital Screening Bilat w/ [facility]; Future; Expected date: 04/03/2024    5. Encounter for colorectal cancer screening  Assessment & Plan:  Cologuard initiated.    Orders:  -     Cologuard Screening (Multitarget Stool DNA); Future; Expected date: 04/03/2024           RESULTS:  Recent Results (from the past 1008 hour(s))   POCT Glucose, Hand-Held Device    Collection Time: 04/03/24  8:59 AM   Result Value Ref Range    POC Glucose 379 (A) 70 - 110 MG/DL   Hemoglobin A1C    Collection Time: 04/03/24  9:03 AM   Result Value Ref Range    Hemoglobin A1c >14.0 (H) <=7.0 %    Estimated Average Glucose     Microalbumin/Creatinine Ratio, Urine    Collection Time: 04/03/24  9:03 AM   Result Value Ref Range    Urine Microalbumin 122.1 (H) <=30.0 ug/ml    Urine Creatinine 54.1 45.0 - 106.0 mg/dL    Microalbumin Creatinine Ratio 225.7 (H) 0.0 - 30.0 mg/gm Cr   Basic Metabolic Panel    Collection Time: 04/03/24  9:03 AM   Result Value Ref Range    Sodium Level 137 136 - 145 mmol/L    Potassium Level 3.7 3.5 - 5.1 mmol/L    Chloride 101 98 - 107 mmol/L    Carbon Dioxide 27 23 - 31 mmol/L    Glucose Level 387 (H) 82 - 115 mg/dL    Blood Urea Nitrogen 14.7 9.8 - 20.1 mg/dL    Creatinine 1.15 (H) 0.55 - 1.02 mg/dL    BUN/Creatinine Ratio 13     Calcium Level Total 10.4 (H) 8.4 - 10.2 mg/dL    Anion Gap 9.0 mEq/L    eGFR 54 mls/min/1.73/m2         Follow Up:  Follow up in about 2 weeks (around 4/17/2024).        Previous medical history/lab work/radiology reviewed and considered during medical management decisions.   Medication list reviewed and medication reconciliation performed.  Patient was provided  and care about his/her current diagnosis  (es) and medications including risk/benefit and side effects/adverse events, over the counter medication uses/doses, home self-care and contact precautions,  and red flags and indications for when to seek immediate medical attention.   Patient was advised to continue compliance with current medication list and medical recommendations.  Patient dvised continued compliance with recommended eating habits/ diets for medical conditions and exercise 150 minutes/ week (if possible) for medical condition (s).  Educational handouts and instructions on selected disease management in AVS (After Visit Summary).    All of the patient's questions were answered to patient's satisfaction.   The patient was receptive, expressed verbal understanding and agreement the above plan.      This note was created with the assistance of a voice recognition software or phone dictation. There may be transcription errors as a result of using this technology however minimal. Effort has been made to assure accuracy of transcription but any obvious errors or omissions should be clarified with the author of the document

## 2024-04-04 ENCOUNTER — TELEPHONE (OUTPATIENT)
Dept: FAMILY MEDICINE | Facility: CLINIC | Age: 64
End: 2024-04-04
Payer: MEDICAID

## 2024-04-04 NOTE — PROGRESS NOTES
PLEASE CALL PATIENTS WITH RESULTS  Hemoglobin 1 AC greater than 14%.  Please take medication as discussed.  Urine microalbumin to creatinine ratio elevated 225.  Follow diabetic diet meal planning, take blood pressure medication as discussed and return to clinic as discussed.  Keep 2 week virtual visit for blood pressure follow-up.

## 2024-04-04 NOTE — TELEPHONE ENCOUNTER
Called patient to give results. Patient verbalized understanding. No additional questions at this time.     ----- Message from WALLY Morrow sent at 4/4/2024  2:27 PM CDT -----  PLEASE CALL PATIENTS WITH RESULTS  Hemoglobin 1 AC greater than 14%.  Please take medication as discussed.  Urine microalbumin to creatinine ratio elevated 225.  Follow diabetic diet meal planning, take blood pressure medication as discussed and return to clinic as discussed.  Keep 2 week virtual visit for blood pressure follow-up.

## 2024-04-04 NOTE — ASSESSMENT & PLAN NOTE
Total cholesterol 305, HDL 37, triglyceride 293, .  Rx Lipitor 80 mg p.o. nightly.  Stay hydrated with water.  Keep fiber intake 30 g per day if possible.  Questions solicited and answered, patient verbalized and agreed to plan.  The 10-year ASCVD risk score (Keanu TANNER, et al., 2019) is: 59.1%    Values used to calculate the score:      Age: 63 years      Sex: Female      Is Non- : Yes      Diabetic: Yes      Tobacco smoker: No      Systolic Blood Pressure: 182 mmHg      Is BP treated: Yes      HDL Cholesterol: 37 mg/dL      Total Cholesterol: 305 mg/dL

## 2024-04-04 NOTE — PATIENT INSTRUCTIONS
Curtis Guevara,     If you are due for any health screening(s) below please notify me so we can arrange them to be ordered and scheduled. Most healthy patients at your age complete them, but you are free to accept or refuse.     If you can't do it, I'll definitely understand. If you can, I'd certainly appreciate it!    Tests to Keep You Healthy    Mammogram: ORDERED BUT NOT SCHEDULED  Eye Exam: Met on 2/12/2024  Colon Cancer Screening: ORDERED  Last Blood Pressure <= 139/89 (4/3/2024): NO  Last HbA1c < 8 (04/03/2024): Yes      Schedule your breast cancer screening today     Breast cancer is the second most common cancer in women,  and the second leading cause of death from cancer. Mammograms can detect breast cancer early, which significantly increases the chances of curing the cancer.       Our records indicate that you may be overdue for breast cancer screening. Cancer screenings save lives, so schedule yours today to stay healthy.     If you recently had a mammogram performed outside of [facility], please let your Health care team know so that they can update your health record.        Its time for your colon cancer screening     Colorectal cancer is one of the leading causes of cancer death for men and women but it doesnt have to be. Screenings can prevent colorectal cancer or find it early enough to treat and cure the disease.     Our records indicate that you may be overdue for colon cancer screening. A colonoscopy or stool screening test can help identify patients at risk for developing colon cancer. Cancer screenings save lives, so schedule yours today to stay healthy.     A colonoscopy is the preferred test for detecting colon cancer. It is needed only once every 10 years if results are negative. While you are sedated, a flexible, lighted tube with a tiny camera is inserted into the rectum and advanced through the colon to look for cancers.     An alternative screening test that is used at home and  returned to the lab may also be used. It detects hidden blood in bowel movements which could indicate cancer in the colon. If results are positive, you will need a colonoscopy to determine if the blood is a sign of cancer. This type of follow up (diagnostic) colonoscopy usually requires additional copays as required by your insurance provider.     If you recently had your colon cancer screening performed outside of [facility], please let your Health care team know so that they can update your health record. Please contact your PCP if you have any questions.    Lets manage your high blood pressure     Your blood pressure was above 140/90 today during your visit. We recommend that you schedule a nurse visit in two weeks to check your blood pressure and discuss ways to support your health goals.     You can also manage your health and record your blood pressure from the comfort of home by keeping a daily blood pressure log. These results are shared with and reviewed by your provider. Please print this form (Daily Blood Pressure Log) to assist you in keeping track of your blood pressure at home.     Schedule your nurse visit in two weeks to learn more about how to track and manage high blood pressure.    Daily Blood Pressure Log    Name:__________________________________                  Date of Birth:_________    Average Blood Pressure:  __________      Date: Time  (a.m.) Blood  Pressure: Pulse  Rate: Time  (p.m.) Blood  Pressure : Pulse  Rate:   Sample 8:37 127/83 84                                                                                                                                                                                   Lets manage your A1c levels     Your last hemoglobin A1c was higher than the goal of less than 8. Hemoglobin A1c or HbA1c is a blood test that measures your average blood sugar levels over the past 3 months. It is the main test to help you and your health care team  manage your diabetes.     Higher A1c levels are linked to diabetes complications, such as loss of vision, kidney disease, and nerve damage. Keeping your A1c at least less than 8 is important to stay healthy and we are here to help. Talk with your provider on how you can further improve your A1c.     We recommend that you set up blood work to get a repeat hemoglobin A1c in 3 months to monitor your diabetes. Let your health care team know if you have questions.

## 2024-04-04 NOTE — ASSESSMENT & PLAN NOTE
Uncontrolled.  Current hemoglobin A1c greater than 14%.  Not taking medications as prescribed.  Patient ready to start taking diabetic medications and to follow diabetic diet meal planning.  Was not able to tolerate metformin in the past due to GI symptoms.  Rx Januvia 25 mg p.o. once daily  Rx Farxiga 10 mg p.o. once daily.  Rx Lipitor 80 mg p.o. once daily.  -2 g a day dietary sodium restriction  -optimize glycemic control (goal A1c is less than 7%)  -control high blood pressure (goal blood pressure is less than 130/80, patient was advised to check blood pressure once or twice a week and bring blood pressure logs to next office visit)  -exercise at least 30 minutes a day, 5 days a week  -maintain healthy weight  -decrease or stop alcohol use  -do not smoke  -stay well hydrated (drink water only, avoid juices, sweet tea, and sodas)  -ask about staying up-to-date on vaccinations (flu vaccine, pneumonia vaccine, hepatitis B vaccine)  -avoid excessive use of NSAIDs (ibuprofen, naproxen, Aleve, Advil, Toradol, Mobic), take Tylenol as needed for headache or mild pain  -take cholesterol lowering medications if prescribed (LDL goal less than 100).  Questions solicited and answered, patient verbalized and agreed to plan.

## 2024-04-04 NOTE — ASSESSMENT & PLAN NOTE
Uncontrolled.  Not taking medications as prescribed.  Current blood pressure 182/116.  Asymptomatic.  Patient ready to take blood pressure medication.  Discussed Rx 80 mg p.o. once daily.  Monitor blood pressure at home.  2 week virtual visit.  Questions solicited and answered, patient verbalized and agreed to plan.

## 2024-04-18 ENCOUNTER — TELEPHONE (OUTPATIENT)
Dept: FAMILY MEDICINE | Facility: CLINIC | Age: 64
End: 2024-04-18
Payer: MEDICAID

## 2024-04-18 DIAGNOSIS — I10 PRIMARY HYPERTENSION: Primary | ICD-10-CM

## 2024-04-18 RX ORDER — AMLODIPINE BESYLATE 10 MG/1
10 TABLET ORAL DAILY
Qty: 90 TABLET | Refills: 3 | Status: SHIPPED | OUTPATIENT
Start: 2024-04-18 | End: 2024-04-19 | Stop reason: SDUPTHER

## 2024-04-18 NOTE — TELEPHONE ENCOUNTER
Spoke to patient while attempting to help her set up telemedicine visit. Patient states that her blood pressure was 170/111 today and 169/90 yesterday. Patient states that she has been compliant with her medication.

## 2024-04-18 NOTE — PROGRESS NOTES
Nurse Spoke to patient while attempting to help her set up telemedicine visit. Patient states that her blood pressure was 170/111 today and 169/90 yesterday. Patient states that she has been compliant with her medication.  Rx Norvasc 10 mg p.o. once daily has been sent to preferred pharmacy, take 1st dose today.  Continue valsartan 80 mg p.o. once daily.  Will attempt virtual visit tomorrow or she might return to clinic for assessment.

## 2024-04-19 ENCOUNTER — CLINICAL SUPPORT (OUTPATIENT)
Dept: FAMILY MEDICINE | Facility: CLINIC | Age: 64
End: 2024-04-19
Payer: MEDICAID

## 2024-04-19 VITALS — SYSTOLIC BLOOD PRESSURE: 146 MMHG | DIASTOLIC BLOOD PRESSURE: 86 MMHG

## 2024-04-19 DIAGNOSIS — I10 PRIMARY HYPERTENSION: ICD-10-CM

## 2024-04-19 DIAGNOSIS — I10 PRIMARY HYPERTENSION: Primary | ICD-10-CM

## 2024-04-19 PROCEDURE — 99211 OFF/OP EST MAY X REQ PHY/QHP: CPT | Mod: PBBFAC

## 2024-04-19 RX ORDER — AMLODIPINE BESYLATE 10 MG/1
10 TABLET ORAL DAILY
Qty: 90 TABLET | Refills: 3 | Status: SHIPPED | OUTPATIENT
Start: 2024-04-19 | End: 2025-04-19

## 2024-04-19 NOTE — PROGRESS NOTES
Paola Cm 63 y.o. female is here today for Blood Pressure check.   History of HTN yes.    Review of patient's allergies indicates:   Allergen Reactions    Latex Itching     Creatinine   Date Value Ref Range Status   04/03/2024 1.15 (H) 0.55 - 1.02 mg/dL Final   02/22/2024 1.04 0.57 - 1.25 mg/dL Final     Sodium   Date Value Ref Range Status   02/22/2024 135 (L) 136 - 145 mmol/L Final     Sodium Level   Date Value Ref Range Status   04/03/2024 137 136 - 145 mmol/L Final     Potassium   Date Value Ref Range Status   02/22/2024 4.4 3.5 - 5.1 mmol/L Final     Potassium Level   Date Value Ref Range Status   04/03/2024 3.7 3.5 - 5.1 mmol/L Final   ]  Patient verifies taking blood pressure medications on a regular basis at the same time of the day.     Current Outpatient Medications:     amLODIPine (NORVASC) 10 MG tablet, Take 1 tablet (10 mg total) by mouth once daily., Disp: 90 tablet, Rfl: 3    atorvastatin (LIPITOR) 80 MG tablet, Take 1 tablet (80 mg total) by mouth once daily., Disp: 90 tablet, Rfl: 3    blood pressure monitor Kit, 1 each by Misc.(Non-Drug; Combo Route) route 2 (two) times daily as needed (blood pressure)., Disp: 1 each, Rfl: 0    blood sugar diagnostic Strp, To check BG 2 times daily, to use with insurance preferred meter, Disp: 200 each, Rfl: 2    blood-glucose meter kit, To check BG 2 times daily, to use with insurance preferred meter, Disp: 1 each, Rfl: 0    dapagliflozin propanediol (FARXIGA) 10 mg tablet, Take 1 tablet (10 mg total) by mouth once daily., Disp: 90 tablet, Rfl: 3    lancets [facility], To check BG 2 times daily, to use with insurance preferred meter, Disp: 200 each, Rfl: 2    medical supply, miscellaneous (BLOOD PRESSURE CUFF MISC), USE TWICE DAILY AS NEEDED TO check blood pressure, Disp: , Rfl:     phenylephrine HCL 2.5% (MYDFRIN) 2.5 % ophthalmic solution, Apply 1 drop to eye once., Disp: , Rfl:     proparacaine (ALCAINE) 0.5 % ophthalmic solution, Apply 1 drop to eye once.,  Disp: , Rfl:     SITagliptin phosphate (JANUVIA) 25 MG Tab, Take 1 tablet (25 mg total) by mouth once daily., Disp: 90 tablet, Rfl: 0    tropicamide 1% (MYDRIACYL) 1 % Drop, Place 1 drop into both eyes once., Disp: , Rfl:     valsartan (DIOVAN) 80 MG tablet, Take 1 tablet (80 mg total) by mouth once daily., Disp: 30 tablet, Rfl: 11  Does patient have record of home blood pressure readings yes. Readings have been averaging 150/100.   Last dose of blood pressure medication was taken at 11 am.  Patient is asymptomatic.   Complains of n/a.    BP: (!) 146/86 ,   .    Blood pressure reading after 15 minutes was 144/82, Pulse 74.  Dr. Kurtz notified.

## 2024-04-26 ENCOUNTER — OFFICE VISIT (OUTPATIENT)
Dept: FAMILY MEDICINE | Facility: CLINIC | Age: 64
End: 2024-04-26
Payer: MEDICAID

## 2024-04-26 VITALS — DIASTOLIC BLOOD PRESSURE: 83 MMHG | SYSTOLIC BLOOD PRESSURE: 143 MMHG

## 2024-04-26 DIAGNOSIS — I10 PRIMARY HYPERTENSION: Primary | ICD-10-CM

## 2024-04-26 PROCEDURE — 1160F RVW MEDS BY RX/DR IN RCRD: CPT | Mod: CPTII,95,, | Performed by: NURSE PRACTITIONER

## 2024-04-26 PROCEDURE — 3066F NEPHROPATHY DOC TX: CPT | Mod: CPTII,95,, | Performed by: NURSE PRACTITIONER

## 2024-04-26 PROCEDURE — 3046F HEMOGLOBIN A1C LEVEL >9.0%: CPT | Mod: CPTII,95,, | Performed by: NURSE PRACTITIONER

## 2024-04-26 PROCEDURE — 1159F MED LIST DOCD IN RCRD: CPT | Mod: CPTII,95,, | Performed by: NURSE PRACTITIONER

## 2024-04-26 PROCEDURE — 99212 OFFICE O/P EST SF 10 MIN: CPT | Mod: 95,,, | Performed by: NURSE PRACTITIONER

## 2024-04-26 PROCEDURE — 4010F ACE/ARB THERAPY RXD/TAKEN: CPT | Mod: CPTII,95,, | Performed by: NURSE PRACTITIONER

## 2024-04-26 PROCEDURE — 3077F SYST BP >= 140 MM HG: CPT | Mod: CPTII,95,, | Performed by: NURSE PRACTITIONER

## 2024-04-26 PROCEDURE — 3060F POS MICROALBUMINURIA REV: CPT | Mod: CPTII,95,, | Performed by: NURSE PRACTITIONER

## 2024-04-26 PROCEDURE — 3079F DIAST BP 80-89 MM HG: CPT | Mod: CPTII,95,, | Performed by: NURSE PRACTITIONER

## 2024-04-26 NOTE — PROGRESS NOTES
The patient location is: car  The chief complaint leading to consultation is: htn    Visit type: audiovisual    Face to Face time with patient: 7  10  minutes of total time spent on the encounter, which includes face to face time and non-face to face time preparing to see the patient (eg, review of tests), Obtaining and/or reviewing separately obtained history, Documenting clinical information in the electronic or other health record, Independently interpreting results (not separately reported) and communicating results to the patient/family/caregiver, or Care coordination (not separately reported).         Each patient to whom he or she provides medical services by telemedicine is:  (1) informed of the relationship between the physician and patient and the respective role of any other health care provider with respect to management of the patient; and (2) notified that he or she may decline to receive medical services by telemedicine and may withdraw from such care at any time.    Notes:    Hypertension:   patient currently on Norvasc 10 mg p.o. once daily and Diovan 80 mg p.o. once daily.   Reported blood pressure 143/83.  Patient state she has multiple normal blood pressure but she forgot to document.   Blood pressure goal top number below 140 and bottom number below 90.   Patient has an appointment coming up this Tuesday with Ophthalmology in [place], blood pressure will be checked on that day and reported to the clinic.   Patient awaiting clearance for cataract surgery.  Blood pressure has to be controlled.   Follow low-salt diet less than 3 g per day if possible.   Stay hydrated with water.   Take medications as directed.   Continue to monitor blood pressure at home.   Questions solicited and answered, patient verbalized and agreed to plan.    05/21/2024 addendum:  Patient has ophthalmology surgery this morning for cataract at the [facility].  Patient was told her her blood pressure controlled  she will be able to go through her cataract surgery.  Advise patient she will not be cleared because her diabetes is not controlled.  Last hemoglobin A1c was completed a month ago greater than 14%.  This morning spoke to ophthalmology clinic and advise them regarding the patient status and she is not cleared for surgery my standpoint and patient risk of delayed healing due to uncontrolled diabetes.   Ophthalmology office will contact patient to cancel surgery.

## 2024-04-26 NOTE — ASSESSMENT & PLAN NOTE
Hypertension:   patient currently on Norvasc 10 mg p.o. once daily and Diovan 80 mg p.o. once daily.   Reported blood pressure 143/83.  Patient state she has multiple normal blood pressure but she forgot to document.   Patient has an appointment coming up this Tuesday with Ophthalmology in [place], blood pressure will be checked on that day and reported to the clinic.   Patient awaiting clearance for cataract surgery.  Blood pressure has to be controlled.   Follow low-salt diet less than 3 g per day if possible.   Stay hydrated with water.   Take medications as directed.   Continue to monitor blood pressure at home.   Questions solicited and answered, patient verbalized and agreed to plan.

## 2024-05-01 ENCOUNTER — HOSPITAL ENCOUNTER (OUTPATIENT)
Dept: RADIOLOGY | Facility: HOSPITAL | Age: 64
Discharge: HOME | End: 2024-05-01
Attending: NURSE PRACTITIONER
Payer: MEDICAID

## 2024-05-01 DIAGNOSIS — Z12.31 ENCOUNTER FOR SCREENING MAMMOGRAM FOR BREAST CANCER: ICD-10-CM

## 2024-05-01 PROCEDURE — 77067 SCR MAMMO BI INCL CAD: CPT | Mod: TC

## 2024-05-01 PROCEDURE — 77067 SCR MAMMO BI INCL CAD: CPT | Mod: 26,,, | Performed by: RADIOLOGY

## 2024-05-01 PROCEDURE — 77063 BREAST TOMOSYNTHESIS BI: CPT | Mod: 26,,, | Performed by: RADIOLOGY

## 2024-05-02 ENCOUNTER — TELEPHONE (OUTPATIENT)
Dept: FAMILY MEDICINE | Facility: CLINIC | Age: 64
End: 2024-05-02
Payer: MEDICAID

## 2024-05-02 NOTE — PROGRESS NOTES
PLEASE CALL PATIENTS WITH RESULT  FINDINGS:   No suspicious mass, asymmetry, distortion, or calcification is identified.       IMPRESSION: NEGATIVE  Right Breast: Negative (BI-RADS 1)  Left Breast: Negative (BI-RADS 1)    Recommendations:  Recommend continued annual screening mammography, according to [employer] guidelines.

## 2024-05-02 NOTE — TELEPHONE ENCOUNTER
Patient informed mammogram negative patient voiced understanding.    ----- Message from WALLY Morrow sent at 5/2/2024  9:06 AM CDT -----  PLEASE CALL PATIENTS WITH RESULT  FINDINGS:   No suspicious mass, asymmetry, distortion, or calcification is identified.         IMPRESSION: NEGATIVE  Right Breast: Negative (BI-RADS 1)  Left Breast: Negative (BI-RADS 1)     Recommendations:  Recommend continued annual screening mammography, according to [employer] guidelines.

## 2024-05-06 ENCOUNTER — PATIENT MESSAGE (OUTPATIENT)
Dept: FAMILY MEDICINE | Facility: CLINIC | Age: 64
End: 2024-05-06
Payer: MEDICAID

## 2024-05-20 ENCOUNTER — PATIENT MESSAGE (OUTPATIENT)
Dept: FAMILY MEDICINE | Facility: CLINIC | Age: 64
End: 2024-05-20
Payer: MEDICAID

## 2024-05-20 ENCOUNTER — TELEPHONE (OUTPATIENT)
Dept: FAMILY MEDICINE | Facility: CLINIC | Age: 64
End: 2024-05-20
Payer: MEDICAID

## 2024-05-20 NOTE — TELEPHONE ENCOUNTER
Attempted to call patient back regarding surgery clearance. There was no answer and I was unable to leave a voicemail.

## 2024-06-11 ENCOUNTER — CLINICAL SUPPORT (OUTPATIENT)
Dept: FAMILY MEDICINE | Facility: CLINIC | Age: 64
End: 2024-06-11
Payer: MEDICAID

## 2024-06-11 DIAGNOSIS — I10 PRIMARY HYPERTENSION: Primary | ICD-10-CM

## 2024-06-11 NOTE — PROGRESS NOTES
Paola LILLY Cm 63 y.o. female is here today for Blood Pressure check.   History of HTN yes.    Review of patient's allergies indicates:   Allergen Reactions    Latex Itching     Creatinine   Date Value Ref Range Status   04/03/2024 1.15 (H) 0.55 - 1.02 mg/dL Final   02/22/2024 1.04 0.57 - 1.25 mg/dL Final     Sodium   Date Value Ref Range Status   04/03/2024 137 136 - 145 mmol/L Final   02/22/2024 135 (L) 136 - 145 mmol/L Final     Potassium   Date Value Ref Range Status   04/03/2024 3.7 3.5 - 5.1 mmol/L Final   02/22/2024 4.4 3.5 - 5.1 mmol/L Final   ]  Patient verifies taking blood pressure medications on a regular basis at the same time of the day.     Current Outpatient Medications:     amLODIPine (NORVASC) 10 MG tablet, Take 1 tablet (10 mg total) by mouth once daily., Disp: 90 tablet, Rfl: 3    atorvastatin (LIPITOR) 80 MG tablet, Take 1 tablet (80 mg total) by mouth once daily., Disp: 90 tablet, Rfl: 3    blood pressure monitor Kit, 1 each by Misc.(Non-Drug; Combo Route) route 2 (two) times daily as needed (blood pressure)., Disp: 1 each, Rfl: 0    blood sugar diagnostic Strp, To check BG 2 times daily, to use with insurance preferred meter, Disp: 200 each, Rfl: 2    blood-glucose meter kit, To check BG 2 times daily, to use with insurance preferred meter, Disp: 1 each, Rfl: 0    dapagliflozin propanediol (FARXIGA) 10 mg tablet, Take 1 tablet (10 mg total) by mouth once daily., Disp: 90 tablet, Rfl: 3    lancets Mis, To check BG 2 times daily, to use with insurance preferred meter, Disp: 200 each, Rfl: 2    medical supply, miscellaneous (BLOOD PRESSURE CUFF MISC), USE TWICE DAILY AS NEEDED TO check blood pressure, Disp: , Rfl:     phenylephrine HCL 2.5% (MYDFRIN) 2.5 % ophthalmic solution, Apply 1 drop to eye once., Disp: , Rfl:     proparacaine (ALCAINE) 0.5 % ophthalmic solution, Apply 1 drop to eye once., Disp: , Rfl:     SITagliptin phosphate (JANUVIA) 25 MG Tab, Take 1 tablet (25 mg total) by mouth once  daily., Disp: 90 tablet, Rfl: 0    tropicamide 1% (MYDRIACYL) 1 % Drop, Place 1 drop into both eyes once., Disp: , Rfl:     valsartan (DIOVAN) 80 MG tablet, Take 1 tablet (80 mg total) by mouth once daily., Disp: 30 tablet, Rfl: 11  Does patient have record of home blood pressure readings yes. Readings have been averaging yes.   Last dose of blood pressure medication was taken at 11:30pm.  Patient is asymptomatic.   Complains of no complaints.      ,   .    Blood pressure reading after 15 minutes was 138/74/ pulse 93, 149/73 Pulse 92. 147/87 89 pulse  Dr. Jerardo Palma notified.

## 2024-09-27 ENCOUNTER — TELEPHONE (OUTPATIENT)
Dept: FAMILY MEDICINE | Facility: CLINIC | Age: 64
End: 2024-09-27
Payer: COMMERCIAL

## 2024-09-27 ENCOUNTER — OFFICE VISIT (OUTPATIENT)
Dept: FAMILY MEDICINE | Facility: CLINIC | Age: 64
End: 2024-09-27
Payer: COMMERCIAL

## 2024-09-27 VITALS
HEIGHT: 61 IN | WEIGHT: 161 LBS | SYSTOLIC BLOOD PRESSURE: 131 MMHG | TEMPERATURE: 98 F | HEART RATE: 81 BPM | OXYGEN SATURATION: 99 % | BODY MASS INDEX: 30.4 KG/M2 | DIASTOLIC BLOOD PRESSURE: 85 MMHG | RESPIRATION RATE: 18 BRPM

## 2024-09-27 DIAGNOSIS — Z12.11 ENCOUNTER FOR COLORECTAL CANCER SCREENING: ICD-10-CM

## 2024-09-27 DIAGNOSIS — Z12.12 ENCOUNTER FOR COLORECTAL CANCER SCREENING: ICD-10-CM

## 2024-09-27 DIAGNOSIS — I10 PRIMARY HYPERTENSION: ICD-10-CM

## 2024-09-27 DIAGNOSIS — E11.65 TYPE 2 DIABETES MELLITUS WITH HYPERGLYCEMIA, WITHOUT LONG-TERM CURRENT USE OF INSULIN: Primary | ICD-10-CM

## 2024-09-27 LAB
EST. AVERAGE GLUCOSE BLD GHB EST-MCNC: 300.6 MG/DL
HBA1C MFR BLD: 12.1 %

## 2024-09-27 PROCEDURE — 99215 OFFICE O/P EST HI 40 MIN: CPT | Mod: PBBFAC | Performed by: NURSE PRACTITIONER

## 2024-09-27 PROCEDURE — 36415 COLL VENOUS BLD VENIPUNCTURE: CPT | Performed by: NURSE PRACTITIONER

## 2024-09-27 PROCEDURE — 83036 HEMOGLOBIN GLYCOSYLATED A1C: CPT | Performed by: NURSE PRACTITIONER

## 2024-09-27 RX ORDER — SEMAGLUTIDE 0.68 MG/ML
0.5 INJECTION, SOLUTION SUBCUTANEOUS
Qty: 3 ML | Refills: 0 | Status: SHIPPED | OUTPATIENT
Start: 2024-11-18 | End: 2024-12-10

## 2024-09-27 RX ORDER — VALSARTAN 80 MG/1
80 TABLET ORAL DAILY
Qty: 90 TABLET | Refills: 3 | Status: SHIPPED | OUTPATIENT
Start: 2024-09-27

## 2024-09-27 RX ORDER — SEMAGLUTIDE 0.68 MG/ML
0.25 INJECTION, SOLUTION SUBCUTANEOUS
Qty: 3 ML | Refills: 0 | Status: SHIPPED | OUTPATIENT
Start: 2024-09-27 | End: 2024-11-16

## 2024-09-27 NOTE — TELEPHONE ENCOUNTER
----- Message from WALLY Morrow sent at 9/27/2024  3:17 PM CDT -----  PLEASE CALL PATIENTS WITH RESULT  Hemoglobin A1c 12%.  Stop Januvia,   new prescription Rx Ozempic 0.25 mg subQ every 7 days for 8 weeks and then start new dose 0.5 mg subQ every 7 days until you return to clinic visit in December.

## 2024-09-27 NOTE — PROGRESS NOTES
Patient Name: Paola Cm   : 1960  MRN: [id number]     SUBJECTIVE DATA:    CHIEF COMPLAINT:   Paola Cm is a 63 y.o. female who presents to clinic today with Hypertension and Diabetes        HPI:  63-year-old female presents to the clinic accompanied by her son to follow-up on hypertension and diabetes.  Medically noncompliant with medications nor visits.      Diabetes:  Last hemoglobin 1 AC was completed 5 months ago greater than 14%.  Patient state she is compliant with Januvia 25 mg p.o. once daily and Farxiga 10 mg p.o. once daily.  Discussed with patient we need to have a titer controlled on diabetes.  -3g a day dietary sodium restriction  -optimize glycemic control (goal A1c is less than 7%)  -control high blood pressure (goal blood pressure is less than 130/80)  -exercise at least 30 minutes a day, 5 days a week  -maintain healthy weight  -decrease or stop alcohol use  -do not smoke  -stay well hydrated (drink water only, avoid juices, sweet tea, and sodas)  -ask about staying up-to-date on vaccinations (flu vaccine, pneumonia vaccine, hepatitis B vaccine)  -avoid excessive use of NSAIDs (ibuprofen, naproxen, Aleve, Advil, Toradol, Mobic), take Tylenol as needed for headache or mild pain  -take cholesterol lowering medications if prescribed (LDL goal less than 100).  Bedside education on how to use Ozempic pen.  Patient verbalized understanding  Visit today included increased complexity associated with the care of the episodic problem diabetes addressed and managing the longitudinal care of the patient due to the serious and/or complex managed problem(s)  diabetes.      Addendum:  Hemoglobin A1c 12%.  Stop Januvia, new prescription Rx Ozempic 0.25 mg subQ every 7 days for 8 weeks and then start new dose 0.5 mg subQ every 7 days until you return to clinic visit in December.      Hypertension:  Controlled, current blood pressure 131/85.  Patient state she is compliant with valsartan 80 mg p.o.  "once daily and amlodipine 10 mg p.o. once daily.  Continue to follow low-salt diet less than 2 g per day, stay physically active, lose weight, stay hydrated with water, follow low-fat, low-cholesterol diet.  Keep your follow-up appointment as directed.  Questions solicited and answered, patient verbalized and agreed to plan.    Patient denies chest pain, shortness of breath, dyspnea on exertion, palpitations, peripheral edema, abdominal pain, nausea, vomiting, diarrhea, constipation, fatigue, fever, chills, dysuria,  hematuria, melena, or hematochezia.          ALLERGIES:   Review of patient's allergies indicates:   Allergen Reactions    Latex Itching         ROS:  Review of Systems   All other systems reviewed and are negative.        OBJECTIVE DATA:  Vital signs  Vitals:    09/27/24 1132   BP: 131/85   Pulse: 81   Resp: 18   Temp: 97.8 °F (36.6 °C)   TempSrc: Oral   SpO2: 99%   Weight: 73 kg (161 lb)   Height: 5' 1" (1.549 m)      Body mass index is 30.42 kg/m².    PHYSICAL EXAM:   Physical Exam  Vitals and nursing note reviewed.   Constitutional:       General: She is awake. She is not in acute distress.     Appearance: Normal appearance. She is well-developed and well-groomed. She is obese. She is not ill-appearing, toxic-appearing or diaphoretic.   HENT:      Head: Normocephalic and atraumatic.      Right Ear: Tympanic membrane, ear canal and external ear normal.      Left Ear: Tympanic membrane, ear canal and external ear normal.      Nose: Nose normal.      Mouth/Throat:      Lips: Pink.      Mouth: Mucous membranes are moist.      Pharynx: Oropharynx is clear. Uvula midline.   Eyes:      General: Lids are normal.      Extraocular Movements: Extraocular movements intact.      Conjunctiva/sclera: Conjunctivae normal.      Pupils: Pupils are equal, round, and reactive to light.   Neck:      Trachea: Trachea and phonation normal.   Cardiovascular:      Rate and Rhythm: Normal rate and regular rhythm.      " Pulses: Normal pulses.           Radial pulses are 2+ on the right side and 2+ on the left side.      Heart sounds: Normal heart sounds.   Pulmonary:      Effort: Pulmonary effort is normal.      Breath sounds: Normal breath sounds and air entry.   Abdominal:      General: Abdomen is flat.   Genitourinary:     Comments: Denies urinary symptoms.  Musculoskeletal:         General: Normal range of motion.      Cervical back: Normal range of motion and neck supple.      Right lower leg: No edema.      Left lower leg: No edema.   Skin:     General: Skin is warm and dry.      Capillary Refill: Capillary refill takes less than 2 seconds.   Neurological:      General: No focal deficit present.      Mental Status: She is alert and oriented to person, place, and time. Mental status is at baseline.      GCS: GCS eye subscore is 4. GCS verbal subscore is 5. GCS motor subscore is 6.      Cranial Nerves: No cranial nerve deficit.      Sensory: No sensory deficit.      Motor: No weakness.      Coordination: Coordination normal.      Gait: Gait normal.   Psychiatric:         Attention and Perception: Attention and perception normal.         Mood and Affect: Mood and affect normal.         Speech: Speech normal.         Behavior: Behavior normal. Behavior is cooperative.         Thought Content: Thought content normal.         Cognition and Memory: Cognition and memory normal.         Judgment: Judgment normal.          ASSESSMENT/PLAN:  1. Type 2 diabetes mellitus with hyperglycemia, without long-term current use of insulin  Assessment & Plan:   Last hemoglobin 1 AC was completed 5 months ago greater than 14%.  Patient state she is compliant with Januvia 25 mg p.o. once daily and Farxiga 10 mg p.o. once daily.  Discussed with patient we need to have a titer controlled on diabetes.  -3g a day dietary sodium restriction  -optimize glycemic control (goal A1c is less than 7%)  -control high blood pressure (goal blood pressure is less  than 130/80)  -exercise at least 30 minutes a day, 5 days a week  -maintain healthy weight  -decrease or stop alcohol use  -do not smoke  -stay well hydrated (drink water only, avoid juices, sweet tea, and sodas)  -ask about staying up-to-date on vaccinations (flu vaccine, pneumonia vaccine, hepatitis B vaccine)  -avoid excessive use of NSAIDs (ibuprofen, naproxen, Aleve, Advil, Toradol, Mobic), take Tylenol as needed for headache or mild pain  -take cholesterol lowering medications if prescribed (LDL goal less than 100).  Bedside education on how to use Ozempic pen.  Patient verbalized understanding  Visit today included increased complexity associated with the care of the episodic problem diabetes addressed and managing the longitudinal care of the patient due to the serious and/or complex managed problem(s)  diabetes.      Addendum:  Hemoglobin A1c 12%.  Stop Januvia, new prescription Rx Ozempic 0.25 mg subQ every 7 days for 8 weeks and then start new dose 0.5 mg subQ every 7 days until you return to clinic visit in December.    Orders:  -     Hemoglobin A1C  -     semaglutide (OZEMPIC) 0.25 mg or 0.5 mg (2 mg/3 mL) pen injector; Inject 0.25 mg into the skin every 7 days. for 8 doses  Dispense: 3 mL; Refill: 0  -     semaglutide (OZEMPIC) 0.25 mg or 0.5 mg (2 mg/3 mL) pen injector; Inject 0.5 mg into the skin every 7 days. for 4 doses  Dispense: 3 mL; Refill: 0    2. Primary hypertension  Assessment & Plan:    Hypertension:  Controlled, current blood pressure 131/85.  Patient state she is compliant with valsartan 80 mg p.o. once daily and amlodipine 10 mg p.o. once daily.  Continue to follow low-salt diet less than 2 g per day, stay physically active, lose weight, stay hydrated with water, follow low-fat, low-cholesterol diet.  Keep your follow-up appointment as directed.  Questions solicited and answered, patient verbalized and agreed to plan.    Orders:  -     valsartan (DIOVAN) 80 MG tablet; Take 1 tablet (80  mg total) by mouth once daily.  Dispense: 90 tablet; Refill: 3    3. Encounter for colorectal cancer screening  Assessment & Plan:  Advise patient to please complete Cologuard and send it back to company for analysis.  Patient agreed and verbalized understanding.             RESULTS:  Recent Results (from the past 1008 hour(s))   Hemoglobin A1C    Collection Time: 09/27/24 12:27 PM   Result Value Ref Range    Hemoglobin A1c 12.1 (H) <=7.0 %    Estimated Average Glucose 300.6 mg/dL         Follow Up:  Follow up in about 10 weeks (around 12/6/2024).      Previous medical history/lab work/radiology reviewed and considered during medical management decisions.   Medication list reviewed and medication reconciliation performed.  Patient was provided  and care about his/her current diagnosis (es) and medications including risk/benefit and side effects/adverse events, over the counter medication uses/doses, home self-care and contact precautions,  and red flags and indications for when to seek immediate medical attention.   Patient was advised to continue compliance with current medication list and medical recommendations.  Patient dvised continued compliance with recommended eating habits/ diets for medical conditions and exercise 150 minutes/ week (if possible) for medical condition (s).  Educational handouts and instructions on selected disease management in AVS (After Visit Summary).    All of the patient's questions were answered to patient's satisfaction.   The patient was receptive, expressed verbal understanding and agreement the above plan.        This note was created with the assistance of a voice recognition software or phone dictation. There may be transcription errors as a result of using this technology however minimal. Effort has been made to assure accuracy of transcription but any obvious errors or omissions should be clarified with the author of the document

## 2024-09-27 NOTE — PATIENT INSTRUCTIONS
Curtis Guevara,     If you are due for any health screening(s) below please notify me so we can arrange them to be ordered and scheduled. Most healthy patients at your age complete them, but you are free to accept or refuse.     If you can't do it, I'll definitely understand. If you can, I'd certainly appreciate it!    Tests to Keep You Healthy    Mammogram: Met on 5/1/2024  Eye Exam: Met on 5/15/2024  Colon Cancer Screening: ORDERED  Last Blood Pressure <= 139/89 (9/27/2024): NO  Last HbA1c < 8 (04/03/2024): Yes      Its time for your colon cancer screening     Colorectal cancer is one of the leading causes of cancer death for men and women but it doesnt have to be. Screenings can prevent colorectal cancer or find it early enough to treat and cure the disease.     Our records indicate that you may be overdue for colon cancer screening. A colonoscopy or stool screening test can help identify patients at risk for developing colon cancer. Cancer screenings save lives, so schedule yours today to stay healthy.     A colonoscopy is the preferred test for detecting colon cancer. It is needed only once every 10 years if results are negative. While you are sedated, a flexible, lighted tube with a tiny camera is inserted into the rectum and advanced through the colon to look for cancers.     An alternative screening test that is used at home and returned to the lab may also be used. It detects hidden blood in bowel movements which could indicate cancer in the colon. If results are positive, you will need a colonoscopy to determine if the blood is a sign of cancer. This type of follow up (diagnostic) colonoscopy usually requires additional copays as required by your insurance provider.     If you recently had your colon cancer screening performed outside of [facility], please let your Health care team know so that they can update your health record. Please contact your PCP if you have any questions.    Lets manage  your A1c levels     Your last hemoglobin A1c was higher than the goal of less than 8. Hemoglobin A1c or HbA1c is a blood test that measures your average blood sugar levels over the past 3 months. It is the main test to help you and your health care team manage your diabetes.     Higher A1c levels are linked to diabetes complications, such as loss of vision, kidney disease, and nerve damage. Keeping your A1c at least less than 8 is important to stay healthy and we are here to help. Talk with your provider on how you can further improve your A1c.     We recommend that you set up blood work to get a repeat hemoglobin A1c in 3 months to monitor your diabetes. Let your health care team know if you have questions.

## 2024-09-27 NOTE — ASSESSMENT & PLAN NOTE
Advise patient to please complete Cologuard and send it back to company for analysis.  Patient agreed and verbalized understanding.

## 2024-09-27 NOTE — ASSESSMENT & PLAN NOTE
Last hemoglobin 1 AC was completed 5 months ago greater than 14%.  Patient state she is compliant with Januvia 25 mg p.o. once daily and Farxiga 10 mg p.o. once daily.  Discussed with patient we need to have a titer controlled on diabetes.  -3g a day dietary sodium restriction  -optimize glycemic control (goal A1c is less than 7%)  -control high blood pressure (goal blood pressure is less than 130/80)  -exercise at least 30 minutes a day, 5 days a week  -maintain healthy weight  -decrease or stop alcohol use  -do not smoke  -stay well hydrated (drink water only, avoid juices, sweet tea, and sodas)  -ask about staying up-to-date on vaccinations (flu vaccine, pneumonia vaccine, hepatitis B vaccine)  -avoid excessive use of NSAIDs (ibuprofen, naproxen, Aleve, Advil, Toradol, Mobic), take Tylenol as needed for headache or mild pain  -take cholesterol lowering medications if prescribed (LDL goal less than 100).  Bedside education on how to use Ozempic pen.  Patient verbalized understanding  Visit today included increased complexity associated with the care of the episodic problem diabetes addressed and managing the longitudinal care of the patient due to the serious and/or complex managed problem(s)  diabetes.      Addendum:  Hemoglobin A1c 12%.  Stop Januvia, new prescription Rx Ozempic 0.25 mg subQ every 7 days for 8 weeks and then start new dose 0.5 mg subQ every 7 days until you return to clinic visit in December.

## 2024-09-27 NOTE — ASSESSMENT & PLAN NOTE
Hypertension:  Controlled, current blood pressure 131/85.  Patient state she is compliant with valsartan 80 mg p.o. once daily and amlodipine 10 mg p.o. once daily.  Continue to follow low-salt diet less than 2 g per day, stay physically active, lose weight, stay hydrated with water, follow low-fat, low-cholesterol diet.  Keep your follow-up appointment as directed.  Questions solicited and answered, patient verbalized and agreed to plan.

## 2024-09-27 NOTE — PROGRESS NOTES
PLEASE CALL PATIENTS WITH RESULT  Hemoglobin A1c 12%.  Stop Januvia,   new prescription Rx Ozempic 0.25 mg subQ every 7 days for 8 weeks and then start new dose 0.5 mg subQ every 7 days until you return to clinic visit in December.

## 2024-09-30 ENCOUNTER — TELEPHONE (OUTPATIENT)
Dept: FAMILY MEDICINE | Facility: CLINIC | Age: 64
End: 2024-09-30
Payer: COMMERCIAL

## 2024-09-30 NOTE — TELEPHONE ENCOUNTER
Patient called stating that she would like her medication to go to [facility] in [place].      Patient would also like to know if she can now get his injections in her knees.

## 2024-09-30 NOTE — TELEPHONE ENCOUNTER
Patient called and asked a question regarding steroid injections in knee. During this time, patient was given lab results. Patient verbalized understanding.    ----- Message from WALLY Coreas sent at 9/27/2024  3:17 PM CDT -----  PLEASE CALL PATIENTS WITH RESULT  Hemoglobin A1c 12%.  Stop Januvia,   new prescription Rx Ozempic 0.25 mg subQ every 7 days for 8 weeks and then start new dose 0.5 mg subQ every 7 days until you return to clinic visit in December.       no

## 2024-09-30 NOTE — TELEPHONE ENCOUNTER
Medications sent to preferred pharmacy.  Diabetes needs to be controlled.  Let us get 2 diabetes below 8% before referral.

## 2024-10-01 NOTE — TELEPHONE ENCOUNTER
Spoke to patient and advised her that she should get her A1C under 8 before considering injections.

## 2024-10-03 ENCOUNTER — HOSPITAL ENCOUNTER (EMERGENCY)
Facility: HOSPITAL | Age: 64
Discharge: HOME | End: 2024-10-03
Attending: INTERNAL MEDICINE
Payer: COMMERCIAL

## 2024-10-03 ENCOUNTER — OFFICE VISIT (OUTPATIENT)
Dept: FAMILY MEDICINE | Facility: CLINIC | Age: 64
End: 2024-10-03
Payer: COMMERCIAL

## 2024-10-03 VITALS
SYSTOLIC BLOOD PRESSURE: 160 MMHG | BODY MASS INDEX: 30.41 KG/M2 | RESPIRATION RATE: 17 BRPM | DIASTOLIC BLOOD PRESSURE: 98 MMHG | WEIGHT: 160.94 LBS | HEART RATE: 91 BPM | TEMPERATURE: 98 F | OXYGEN SATURATION: 99 %

## 2024-10-03 VITALS
RESPIRATION RATE: 18 BRPM | HEART RATE: 99 BPM | OXYGEN SATURATION: 98 % | DIASTOLIC BLOOD PRESSURE: 85 MMHG | SYSTOLIC BLOOD PRESSURE: 137 MMHG | HEIGHT: 61 IN | TEMPERATURE: 98 F | WEIGHT: 162 LBS | BODY MASS INDEX: 30.58 KG/M2

## 2024-10-03 DIAGNOSIS — M79.672 LEFT FOOT PAIN: ICD-10-CM

## 2024-10-03 DIAGNOSIS — M79.672 LEFT FOOT PAIN: Primary | ICD-10-CM

## 2024-10-03 DIAGNOSIS — Z71.89 ENCOUNTER FOR MEDICATION COUNSELING: ICD-10-CM

## 2024-10-03 DIAGNOSIS — M79.662 PAIN OF LEFT CALF: Primary | ICD-10-CM

## 2024-10-03 DIAGNOSIS — B35.3 TINEA PEDIS OF LEFT FOOT: ICD-10-CM

## 2024-10-03 DIAGNOSIS — Z71.89 DIABETES EDUCATION, ENCOUNTER FOR: ICD-10-CM

## 2024-10-03 LAB
D DIMER PPP IA.FEU-MCNC: 0.55 UG/ML FEU (ref 0–0.5)
HOLD SPECIMEN: NORMAL

## 2024-10-03 PROCEDURE — 3008F BODY MASS INDEX DOCD: CPT | Mod: CPTII,,, | Performed by: NURSE PRACTITIONER

## 2024-10-03 PROCEDURE — 85379 FIBRIN DEGRADATION QUANT: CPT | Performed by: INTERNAL MEDICINE

## 2024-10-03 PROCEDURE — 99215 OFFICE O/P EST HI 40 MIN: CPT | Mod: PBBFAC | Performed by: NURSE PRACTITIONER

## 2024-10-03 PROCEDURE — 3075F SYST BP GE 130 - 139MM HG: CPT | Mod: CPTII,,, | Performed by: NURSE PRACTITIONER

## 2024-10-03 PROCEDURE — 99215 OFFICE O/P EST HI 40 MIN: CPT | Mod: S$PBB,,, | Performed by: NURSE PRACTITIONER

## 2024-10-03 PROCEDURE — 1160F RVW MEDS BY RX/DR IN RCRD: CPT | Mod: CPTII,,, | Performed by: NURSE PRACTITIONER

## 2024-10-03 PROCEDURE — 3066F NEPHROPATHY DOC TX: CPT | Mod: CPTII,,, | Performed by: NURSE PRACTITIONER

## 2024-10-03 PROCEDURE — 4010F ACE/ARB THERAPY RXD/TAKEN: CPT | Mod: CPTII,,, | Performed by: NURSE PRACTITIONER

## 2024-10-03 PROCEDURE — 3079F DIAST BP 80-89 MM HG: CPT | Mod: CPTII,,, | Performed by: NURSE PRACTITIONER

## 2024-10-03 PROCEDURE — 3046F HEMOGLOBIN A1C LEVEL >9.0%: CPT | Mod: CPTII,,, | Performed by: NURSE PRACTITIONER

## 2024-10-03 PROCEDURE — 1159F MED LIST DOCD IN RCRD: CPT | Mod: CPTII,,, | Performed by: NURSE PRACTITIONER

## 2024-10-03 PROCEDURE — 3060F POS MICROALBUMINURIA REV: CPT | Mod: CPTII,,, | Performed by: NURSE PRACTITIONER

## 2024-10-03 PROCEDURE — 99283 EMERGENCY DEPT VISIT LOW MDM: CPT | Mod: 27

## 2024-10-03 RX ORDER — DICLOFENAC SODIUM 50 MG/1
50 TABLET, DELAYED RELEASE ORAL 2 TIMES DAILY PRN
Qty: 20 TABLET | Refills: 0 | Status: SHIPPED | OUTPATIENT
Start: 2024-10-03

## 2024-10-03 RX ORDER — FLUCONAZOLE 200 MG/1
200 TABLET ORAL
Qty: 8 TABLET | Refills: 0 | Status: SHIPPED | OUTPATIENT
Start: 2024-10-03 | End: 2024-10-31

## 2024-10-03 RX ORDER — MICONAZOLE NITRATE 2 G/100G
POWDER TOPICAL 2 TIMES DAILY
Qty: 71 G | Refills: 0 | Status: SHIPPED | OUTPATIENT
Start: 2024-10-03 | End: 2024-10-17

## 2024-10-03 NOTE — PROGRESS NOTES
"Patient Name: Paola Cm   : 1960  MRN: [id number]     SUBJECTIVE DATA:    CHIEF COMPLAINT:   Paola Cm is a 63 y.o. female who presents to clinic today with Foot Pain        HPI:  Old female presents to the clinic requesting evaluation of left foot pain and education on how to use Ozempic injections.      Left foot pain: started yesterday.  Patient state she went for swimming aerobics. "I was just paddling my feet".  Patient denies any injury or fall.   On exam no swelling noted.  Patient reports tenderness to left dorsal foot with pain radiating up her leg.  On exam also was noted she has skin break between to toe webs that resembles tinea pedis.  No calf pain on exam.  On discharge patient state" how about calf pain". "  My left calf hurts".  Bilateral calf measured both equal at 13.5 inch.   Now patient report left calf pain with palpation as well pain to shin area.  No wounds noted.  No erythema.  Varicose veins noted.  Discussed with patient we need to rule out DVT.  We need to transfer her to emergency department.  15:40 spoke to nurse Cobos at triage.  Patient escorted via wheelchair by nurse.    Left foot tinea pedis:  Noted during exam with skin breakdown.  Drainage noted.  Discussed Rx Zeasorb topical powder twice a day for 14 days and Rx Diflucan 200 mg p.o. twice weekly for 4 weeks.  Inspect feet daily since you have uncontrolled diabetes.  Avoid soaking feet in water until healed.  No swimming until healing achieved.  Return to clinic if no improvement.    How to use Ozempic:  Patient brought medications with her to educate how to inject.  Educated patient on how to use, patient demonstrated, 1st injection was given today.  Wrote injection dates on medication box.  Give injection once a week or every 7 days, rotate injection sites.  After completion of 0.25 mg subQ injections for a total of 8 weeks.  2nd box of Ozempic 0.5 mg subQ every week until your follow-up " "appointment.  Reach out to clinic if you have any questions or concerns.  Questions solicited and answered, patient verbalized understanding and agreed to plan.    Patient denies chest pain, shortness of breath, dyspnea on exertion, palpitations, peripheral edema, abdominal pain, nausea, vomiting, diarrhea, constipation, fatigue, fever, chills, dysuria,  hematuria, dark stools or bloody stools.        ALLERGIES:   Review of patient's allergies indicates:   Allergen Reactions    Latex Itching         ROS:  Review of Systems   Musculoskeletal:         Left foot pain/dorsal aspect   All other systems reviewed and are negative.        OBJECTIVE DATA:  Vital signs  Vitals:    10/03/24 1456   BP: 137/85   Pulse: 99   Resp: 18   Temp: 98.2 °F (36.8 °C)   TempSrc: Oral   SpO2: 98%   Weight: 73.5 kg (162 lb)   Height: 5' 1" (1.549 m)      Body mass index is 30.61 kg/m².    PHYSICAL EXAM:   Physical Exam  Vitals and nursing note reviewed.   Constitutional:       General: She is awake. She is not in acute distress.     Appearance: Normal appearance. She is well-developed and well-groomed. She is obese. She is not ill-appearing, toxic-appearing or diaphoretic.   HENT:      Head: Normocephalic and atraumatic.      Right Ear: External ear normal.      Left Ear: External ear normal.      Nose: Nose normal.      Mouth/Throat:      Lips: Pink.      Mouth: Mucous membranes are moist.      Pharynx: Oropharynx is clear.   Eyes:      General: Lids are normal. No scleral icterus.     Extraocular Movements: Extraocular movements intact.      Pupils: Pupils are equal, round, and reactive to light.   Neck:      Trachea: Trachea and phonation normal.   Cardiovascular:      Rate and Rhythm: Normal rate and regular rhythm.      Pulses: Normal pulses.           Radial pulses are 2+ on the right side and 2+ on the left side.        Dorsalis pedis pulses are 2+ on the right side and 2+ on the left side.        Posterior tibial pulses are 2+ on " the right side and 2+ on the left side.      Heart sounds: Normal heart sounds.   Pulmonary:      Effort: Pulmonary effort is normal.      Breath sounds: Normal breath sounds and air entry.   Abdominal:      General: Abdomen is flat.      Palpations: Abdomen is soft.      Tenderness: There is no abdominal tenderness.   Musculoskeletal:         General: Tenderness present. Normal range of motion.      Cervical back: Normal range of motion and neck supple.      Right lower leg: No edema.      Left lower leg: No edema.      Right foot: Normal range of motion. No deformity, bunion, Charcot foot, foot drop or prominent metatarsal heads.      Left foot: Normal range of motion and normal capillary refill. Tenderness and bony tenderness present. No deformity, bunion, Charcot foot, foot drop, prominent metatarsal heads or crepitus. Normal pulse.        Legs:         Feet:       Comments: Bilateral calves equal, no erythema, no swelling.  Calf circumference 13.5 in bilateral calf.   Feet:      Right foot:      Skin integrity: Skin integrity normal.      Toenail Condition: Right toenails are abnormally thick.      Left foot:      Skin integrity: Skin breakdown present.      Toenail Condition: Left toenails are abnormally thick.   Lymphadenopathy:      Cervical: No cervical adenopathy.   Skin:     General: Skin is warm and dry.      Capillary Refill: Capillary refill takes less than 2 seconds.      Findings: Rash present. No erythema.   Neurological:      General: No focal deficit present.      Mental Status: She is alert and oriented to person, place, and time. Mental status is at baseline.      GCS: GCS eye subscore is 4. GCS verbal subscore is 5. GCS motor subscore is 6.      Cranial Nerves: Cranial nerves 2-12 are intact. No cranial nerve deficit.      Sensory: Sensation is intact. No sensory deficit.      Motor: Motor function is intact. No weakness.      Coordination: Coordination is intact. Coordination normal.       "Gait: Gait abnormal.   Psychiatric:         Attention and Perception: Attention and perception normal.         Mood and Affect: Mood and affect normal.         Speech: Speech normal.         Behavior: Behavior normal. Behavior is cooperative.         Thought Content: Thought content normal.         Cognition and Memory: Cognition and memory normal.         Judgment: Judgment normal.          ASSESSMENT/PLAN:  1. Pain of left calf  Assessment & Plan:  started yesterday.  Patient state she went for swimming aerobics. "I was just paddling my feet".  Patient denies any injury or fall.   On exam no swelling noted.  Patient reports tenderness to left dorsal foot with pain radiating up her leg.  On exam also was noted she has skin break between to toe webs that resembles tinea pedis.  No calf pain on exam.  On discharge patient state" how about calf pain". "  My left calf hurts".  Bilateral calf measured both equal at 13.5 inch.   Now patient report left calf pain with palpation as well pain to shin area.  No wounds noted.  No erythema.  Varicose veins noted.  Discussed with patient we need to rule out DVT.  We need to transfer her to emergency department.  15:40 spoke to nurse Cobos at triage.  Patient escorted via wheelchair by nurse.    Orders:  -     Refer to Emergency Dept.    2. Left foot pain  Assessment & Plan:  started yesterday.  Patient state she went for swimming aerobics. "I was just paddling my feet".  Patient denies any injury or fall.   On exam no swelling noted.  Patient reports tenderness to left dorsal foot with pain radiating up her leg.  On exam also was noted she has skin break between to toe webs that resembles tinea pedis.  No calf pain on exam.  On discharge patient state" how about calf pain". "  My left calf hurts".  Bilateral calf measured both equal at 13.5 inch.   Now patient report left calf pain with palpation as well pain to shin area.  No wounds noted.  No erythema.  Varicose veins " noted.  Discussed with patient we need to rule out DVT.  We need to transfer her to emergency department.  15:40 spoke to nurse Cobos at triage.  Patient escorted via wheelchair by nurse.    Orders:  -     X-Ray Foot Complete Left; Future; Expected date: 10/03/2024    3. Tinea pedis of left foot  Assessment & Plan:   Noted during exam with skin breakdown.  Drainage noted.  Discussed Rx Zeasorb topical powder twice a day for 14 days and Rx Diflucan 200 mg p.o. twice weekly for 4 weeks.  Inspect feet daily since you have uncontrolled diabetes.  Avoid soaking feet in water until healed.  No swimming until healing achieved.  Return to clinic if no improvement.    Orders:  -     miconazole NITRATE 2 % (ZEASORB AF) 2 % top powder; Apply topically 2 (two) times daily. for 14 days  Dispense: 71 g; Refill: 0  -     fluconazole (DIFLUCAN) 200 MG Tab; Take 1 tablet (200 mg total) by mouth every Tues, Thurs.  Dispense: 8 tablet; Refill: 0    4. Diabetes education, encounter for  Assessment & Plan:  Patient brought medications with her to educate how to inject.  Educated patient on how to use, patient demonstrated, 1st injection was given today.  Wrote injection dates on medication box.  Give injection once a week or every 7 days, rotate injection sites.  After completion of 0.25 mg subQ injections for a total of 8 weeks.  2nd box of Ozempic 0.5 mg subQ every week until your follow-up appointment.  Reach out to clinic if you have any questions or concerns.  Questions solicited and answered, patient verbalized understanding and agreed to plan.      5. Encounter for medication counseling  Assessment & Plan:  Patient brought medications with her to educate how to inject.  Educated patient on how to use, patient demonstrated, 1st injection was given today.  Wrote injection dates on medication box.  Give injection once a week or every 7 days, rotate injection sites.  After completion of 0.25 mg subQ injections for a total of  8 weeks.  2nd box of Ozempic 0.5 mg subQ every week until your follow-up appointment.  Reach out to clinic if you have any questions or concerns.  Questions solicited and answered, patient verbalized understanding and agreed to plan.             RESULTS:  Recent Results (from the past 6 weeks)   Hemoglobin A1C    Collection Time: 09/27/24 12:27 PM   Result Value Ref Range    Hemoglobin A1c 12.1 (H) <=7.0 %    Estimated Average Glucose 300.6 mg/dL         Follow Up:  Keep appointment on December 2, 2024.    Patient escorted to emergency room department for higher level of care.        Previous medical history/lab work/radiology reviewed and considered during medical management decisions.   Medication list reviewed and medication reconciliation performed.  Patient was provided  and care about his/her current diagnosis (es) and medications including risk/benefit and side effects/adverse events, over the counter medication uses/doses, home self-care and contact precautions,  and red flags and indications for when to seek immediate medical attention.   Patient was advised to continue compliance with current medication list and medical recommendations.  Patient dvised continued compliance with recommended eating habits/ diets for medical conditions and exercise 150 minutes/ week (if possible) for medical condition (s).  Educational handouts and instructions on selected disease management in AVS (After Visit Summary).    All of the patient's questions were answered to patient's satisfaction.   The patient was receptive, expressed verbal understanding and agreement the above plan.      This note was created with the assistance of a voice recognition software or phone dictation. There may be transcription errors as a result of using this technology however minimal. Effort has been made to assure accuracy of transcription but any obvious errors or omissions should be clarified with the author of the document

## 2024-10-03 NOTE — ASSESSMENT & PLAN NOTE
"started yesterday.  Patient state she went for swimming aerobics. "I was just paddling my feet".  Patient denies any injury or fall.   On exam no swelling noted.  Patient reports tenderness to left dorsal foot with pain radiating up her leg.  On exam also was noted she has skin break between to toe webs that resembles tinea pedis.  No calf pain on exam.  On discharge patient state" how about calf pain". "  My left calf hurts".  Bilateral calf measured both equal at 13.5 inch.   Now patient report left calf pain with palpation as well pain to shin area.  No wounds noted.  No erythema.  Varicose veins noted.  Discussed with patient we need to rule out DVT.  We need to transfer her to emergency department.  15:40 spoke to nurse Cobos at triage.  Patient escorted via wheelchair by nurse.  "

## 2024-10-03 NOTE — ASSESSMENT & PLAN NOTE
Noted during exam with skin breakdown.  Drainage noted.  Discussed Rx Zeasorb topical powder twice a day for 14 days and Rx Diflucan 200 mg p.o. twice weekly for 4 weeks.  Inspect feet daily since you have uncontrolled diabetes.  Avoid soaking feet in water until healed.  No swimming until healing achieved.  Return to clinic if no improvement.

## 2024-10-03 NOTE — ED PROVIDER NOTES
Encounter Date: 10/3/2024       History     Chief Complaint   Patient presents with    Leg Pain     PT BROUGHT FROM CLINIC FOR FURTHER EVAL OF LT FOOT PAIN THAT RAD TO CALF SINCE YESTERDAY.  DENIES INJURY, NO CP OR SOB REPORTED.      Presents from [facility] due to left foot pain to R/O DVT. Pt states pain in the medial aspect of her left foot after she went swimming few days ago. States after she came out the pool her foot started with some pain. Pain is achy, mild, radiating to posterior knee. Denies swelling, rash, injury, sob. No prior Hx of DVT or PE    The history is provided by the patient.     Review of patient's allergies indicates:   Allergen Reactions    Latex Itching     Past Medical History:   Diagnosis Date    Hyperlipidemia     Hypertension     Proliferative diabetic retinopathy of left eye     Uncontrolled type 2 diabetes mellitus with hyperglycemia      Past Surgical History:   Procedure Laterality Date     SECTION      EYE SURGERY      HYSTERECTOMY       Family History   Problem Relation Name Age of Onset    No Known Problems Mother      No Known Problems Father       Social History     Tobacco Use    Smoking status: Never    Smokeless tobacco: Never   Substance Use Topics    Alcohol use: Never    Drug use: Never     Review of Systems   Musculoskeletal:  Positive for arthralgias.       Physical Exam     Initial Vitals [10/03/24 1554]   BP Pulse Resp Temp SpO2   (!) 158/94 95 18 97.9 °F (36.6 °C) 98 %      MAP       --         Physical Exam    Nursing note and vitals reviewed.  Constitutional: She appears well-developed and well-nourished. No distress.   HENT:   Head: Normocephalic and atraumatic. Mouth/Throat: Oropharynx is clear and moist.   Eyes: Conjunctivae and EOM are normal. Pupils are equal, round, and reactive to light.   Neck: No JVD present.   Normal range of motion.  Cardiovascular:  Normal rate, regular rhythm, normal heart sounds and intact distal pulses.            Pulmonary/Chest: Breath sounds normal.   Musculoskeletal:         General: No tenderness or edema. Normal range of motion.      Cervical back: Normal range of motion.      Comments: Symmetrical, non swell calf, varicosities noticed B/L; dorsal pedis +2, Homans negative, lesions between toe webs consistent with tinea pedis.      Neurological: She is alert and oriented to person, place, and time. She has normal strength. GCS score is 15. GCS eye subscore is 4. GCS verbal subscore is 5. GCS motor subscore is 6.   Skin: Skin is warm and dry. No rash noted.   lesions between toe webs consistent with tinea pedis.    Psychiatric: Her behavior is normal. Thought content normal.         ED Course   Procedures  Labs Reviewed   D DIMER, QUANTITATIVE - Abnormal       Result Value    D-Dimer 0.55 (*)    EXTRA TUBES    Narrative:     The following orders were created for panel order EXTRA TUBES.  Procedure                               Abnormality         Status                     ---------                               -----------         ------                     Light Green Top Hold[[id number]]                            In process                 Lavender Top Hold[[id number]]                               In process                 Gold Top Hold[[id number]]                                   In process                   Please view results for these tests on the individual orders.   LIGHT GREEN TOP HOLD   LAVENDER TOP HOLD   GOLD TOP HOLD          Imaging Results    None          Medications - No data to display  Medical Decision Making  Amount and/or Complexity of Data Reviewed  Labs: ordered. Decision-making details documented in ED Course.     Details: Age adjusted D Dimer negative, Very low risk for DVT. Will D/H      Additional MDM:   Differential Diagnosis:   Cellulitis, DVT, venous stasis dermatitis, PVD, among others                                      Clinical Impression:  Final diagnoses:  [M79.672] Left foot  pain (Primary)          ED Disposition Condition    Discharge Stable          ED Prescriptions       Medication Sig Dispense Start Date End Date Auth. Provider    diclofenac (VOLTAREN) 50 MG EC tablet Take 1 tablet (50 mg total) by mouth 2 (two) times daily as needed (Pain). 20 tablet 10/3/2024 -- Rocco Riddle MD          Follow-up Information       Follow up With Specialties Details Why Contact Info    Jerardo Palma, [facility] In 2 weeks  [address] [facility] [address]  [phone]      [facility] Emergency Medicine  If symptoms worsen [address] [facility] [id number] [phone]             Rocco Riddle MD  10/03/24 9157

## 2024-10-03 NOTE — ASSESSMENT & PLAN NOTE
Patient brought medications with her to educate how to inject.  Educated patient on how to use, patient demonstrated, 1st injection was given today.  Wrote injection dates on medication box.  Give injection once a week or every 7 days, rotate injection sites.  After completion of 0.25 mg subQ injections for a total of 8 weeks.  2nd box of Ozempic 0.5 mg subQ every week until your follow-up appointment.  Reach out to clinic if you have any questions or concerns.  Questions solicited and answered, patient verbalized understanding and agreed to plan.

## 2024-10-03 NOTE — PATIENT INSTRUCTIONS
Curtis Guevara,     If you are due for any health screening(s) below please notify me so we can arrange them to be ordered and scheduled. Most healthy patients at your age complete them, but you are free to accept or refuse.     If you can't do it, I'll definitely understand. If you can, I'd certainly appreciate it!    Tests to Keep You Healthy    Mammogram: Met on 5/1/2024  Eye Exam: Met on 5/15/2024  Colon Cancer Screening: ORDERED  Last Blood Pressure <= 139/89 (10/3/2024): Yes  Last HbA1c < 8 (09/27/2024): NO      Its time for your colon cancer screening     Colorectal cancer is one of the leading causes of cancer death for men and women but it doesnt have to be. Screenings can prevent colorectal cancer or find it early enough to treat and cure the disease.     Our records indicate that you may be overdue for colon cancer screening. A colonoscopy or stool screening test can help identify patients at risk for developing colon cancer. Cancer screenings save lives, so schedule yours today to stay healthy.     A colonoscopy is the preferred test for detecting colon cancer. It is needed only once every 10 years if results are negative. While you are sedated, a flexible, lighted tube with a tiny camera is inserted into the rectum and advanced through the colon to look for cancers.     An alternative screening test that is used at home and returned to the lab may also be used. It detects hidden blood in bowel movements which could indicate cancer in the colon. If results are positive, you will need a colonoscopy to determine if the blood is a sign of cancer. This type of follow up (diagnostic) colonoscopy usually requires additional copays as required by your insurance provider.     If you recently had your colon cancer screening performed outside of [facility], please let your Health care team know so that they can update your health record. Please contact your PCP if you have any questions.    Lets manage  your A1c levels     Your last hemoglobin A1c was higher than the goal of less than 8. Hemoglobin A1c or HbA1c is a blood test that measures your average blood sugar levels over the past 3 months. It is the main test to help you and your health care team manage your diabetes.     Higher A1c levels are linked to diabetes complications, such as loss of vision, kidney disease, and nerve damage. Keeping your A1c at least less than 8 is important to stay healthy and we are here to help. Talk with your provider on how you can further improve your A1c.     We recommend that you set up blood work to get a repeat hemoglobin A1c in 3 months to monitor your diabetes. Let your health care team know if you have questions.

## 2024-10-03 NOTE — Clinical Note
Nicholas Walsh JR accompanied their family member to the emergency department on 10/3/2024. They may return to school on 10/04/2024.      If you have any questions or concerns, please don't hesitate to call.      Naima KAPADIA

## 2024-10-03 NOTE — Clinical Note
Rosanne Walsh accompanied their family member to the emergency department on 10/3/2024. They may return to work on 10/04/2024.      If you have any questions or concerns, please don't hesitate to call.      Naima KAPADIA

## 2024-10-18 ENCOUNTER — CLINICAL SUPPORT (OUTPATIENT)
Dept: FAMILY MEDICINE | Facility: CLINIC | Age: 64
End: 2024-10-18
Payer: MEDICAID

## 2024-10-18 ENCOUNTER — OFFICE VISIT (OUTPATIENT)
Dept: ORTHOPEDICS | Facility: CLINIC | Age: 64
End: 2024-10-18
Payer: MEDICAID

## 2024-10-18 ENCOUNTER — HOSPITAL ENCOUNTER (OUTPATIENT)
Dept: RADIOLOGY | Facility: HOSPITAL | Age: 64
Discharge: HOME | End: 2024-10-18
Attending: NURSE PRACTITIONER
Payer: MEDICAID

## 2024-10-18 VITALS
HEIGHT: 61 IN | BODY MASS INDEX: 30.01 KG/M2 | TEMPERATURE: 98 F | HEART RATE: 94 BPM | DIASTOLIC BLOOD PRESSURE: 87 MMHG | SYSTOLIC BLOOD PRESSURE: 132 MMHG | WEIGHT: 158.94 LBS

## 2024-10-18 DIAGNOSIS — R52 PAIN: ICD-10-CM

## 2024-10-18 DIAGNOSIS — E11.65 TYPE 2 DIABETES MELLITUS WITH HYPERGLYCEMIA, WITHOUT LONG-TERM CURRENT USE OF INSULIN: Primary | ICD-10-CM

## 2024-10-18 DIAGNOSIS — M17.0 PRIMARY OSTEOARTHRITIS OF BOTH KNEES: Primary | ICD-10-CM

## 2024-10-18 PROCEDURE — 73564 X-RAY EXAM KNEE 4 OR MORE: CPT | Mod: TC,LT

## 2024-10-18 PROCEDURE — 73564 X-RAY EXAM KNEE 4 OR MORE: CPT | Mod: TC,RT

## 2024-10-18 PROCEDURE — 99211 OFF/OP EST MAY X REQ PHY/QHP: CPT | Mod: PBBFAC,25,27

## 2024-10-18 PROCEDURE — 99215 OFFICE O/P EST HI 40 MIN: CPT | Mod: PBBFAC,25 | Performed by: NURSE PRACTITIONER
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
## 2024-10-18 NOTE — PROGRESS NOTES
Patient came in need re education on Ozempic shot. Instructed patient on sites to stick and importance of cleaning area before injecting medication. Showed patient how to insert needle onto applicator. Guided patient on sticking herself, patient chose abdomen. Advised patient on how to read dial on pen for medication amount. Also advised patient on counting to 6 and waiting until dial is on 0 before removing needle.

## 2024-10-23 ENCOUNTER — TELEPHONE (OUTPATIENT)
Dept: FAMILY MEDICINE | Facility: CLINIC | Age: 64
End: 2024-10-23
Payer: MEDICAID

## 2024-10-23 ENCOUNTER — TELEPHONE (OUTPATIENT)
Dept: ORTHOPEDICS | Facility: CLINIC | Age: 64
End: 2024-10-23
Payer: MEDICAID

## 2024-10-23 NOTE — TELEPHONE ENCOUNTER
Patient called stating that the DME order that was given is not the correct walker she wanted.    Attempted to call no answer unable to leave message

## 2024-10-23 NOTE — TELEPHONE ENCOUNTER
Patient would like new referral to Diabetes Education.    Patient would also like a referral to Nutrition.

## 2024-10-25 ENCOUNTER — TELEPHONE (OUTPATIENT)
Dept: FAMILY MEDICINE | Facility: CLINIC | Age: 64
End: 2024-10-25
Payer: MEDICAID

## 2024-10-31 ENCOUNTER — TELEPHONE (OUTPATIENT)
Dept: OPHTHALMOLOGY | Facility: CLINIC | Age: 64
End: 2024-10-31
Payer: MEDICAID

## 2024-10-31 ENCOUNTER — TELEPHONE (OUTPATIENT)
Dept: FAMILY MEDICINE | Facility: CLINIC | Age: 64
End: 2024-10-31
Payer: MEDICAID

## 2024-11-01 ENCOUNTER — OFFICE VISIT (OUTPATIENT)
Dept: FAMILY MEDICINE | Facility: CLINIC | Age: 64
End: 2024-11-01
Payer: MEDICAID

## 2024-11-01 ENCOUNTER — TELEPHONE (OUTPATIENT)
Dept: FAMILY MEDICINE | Facility: CLINIC | Age: 64
End: 2024-11-01

## 2024-11-01 ENCOUNTER — TELEPHONE (OUTPATIENT)
Dept: ORTHOPEDICS | Facility: CLINIC | Age: 64
End: 2024-11-01
Payer: MEDICAID

## 2024-11-01 ENCOUNTER — CLINICAL SUPPORT (OUTPATIENT)
Dept: DIABETES | Facility: CLINIC | Age: 64
End: 2024-11-01
Payer: MEDICAID

## 2024-11-01 VITALS
BODY MASS INDEX: 30.36 KG/M2 | TEMPERATURE: 98 F | WEIGHT: 160.81 LBS | DIASTOLIC BLOOD PRESSURE: 79 MMHG | SYSTOLIC BLOOD PRESSURE: 127 MMHG | HEIGHT: 61 IN | RESPIRATION RATE: 18 BRPM | OXYGEN SATURATION: 98 % | HEART RATE: 90 BPM

## 2024-11-01 DIAGNOSIS — E11.65 TYPE 2 DIABETES MELLITUS WITH HYPERGLYCEMIA, WITHOUT LONG-TERM CURRENT USE OF INSULIN: ICD-10-CM

## 2024-11-01 DIAGNOSIS — Z12.11 ENCOUNTER FOR COLORECTAL CANCER SCREENING: ICD-10-CM

## 2024-11-01 DIAGNOSIS — Z12.12 ENCOUNTER FOR COLORECTAL CANCER SCREENING: ICD-10-CM

## 2024-11-01 DIAGNOSIS — E11.65 TYPE 2 DIABETES MELLITUS WITH HYPERGLYCEMIA, WITHOUT LONG-TERM CURRENT USE OF INSULIN: Primary | ICD-10-CM

## 2024-11-01 DIAGNOSIS — R19.7 DIARRHEA, UNSPECIFIED TYPE: ICD-10-CM

## 2024-11-01 LAB
BACTERIA #/AREA URNS AUTO: ABNORMAL /HPF
BILIRUB UR QL STRIP.AUTO: NEGATIVE
CLARITY UR: CLEAR
COLOR UR AUTO: ABNORMAL
GLUCOSE SERPL-MCNC: 239 MG/DL (ref 70–110)
GLUCOSE UR QL STRIP: ABNORMAL
HGB UR QL STRIP: NEGATIVE
HYALINE CASTS #/AREA URNS LPF: ABNORMAL /LPF
KETONES UR QL STRIP: NEGATIVE
LEUKOCYTE ESTERASE UR QL STRIP: NEGATIVE
MUCOUS THREADS URNS QL MICRO: ABNORMAL /LPF
NITRITE UR QL STRIP: NEGATIVE
PH UR STRIP: 5 [PH]
PROT UR QL STRIP: NEGATIVE
RBC #/AREA URNS AUTO: ABNORMAL /HPF
SP GR UR STRIP.AUTO: 1.03 (ref 1–1.03)
SQUAMOUS #/AREA URNS LPF: ABNORMAL /HPF
UROBILINOGEN UR STRIP-ACNC: NORMAL
WBC #/AREA URNS AUTO: ABNORMAL /HPF

## 2024-11-01 PROCEDURE — G0108 DIAB MANAGE TRN  PER INDIV: HCPCS | Mod: PBBFAC | Performed by: DIETITIAN, REGISTERED

## 2024-11-01 PROCEDURE — 87427 SHIGA-LIKE TOXIN AG IA: CPT | Performed by: NURSE PRACTITIONER

## 2024-11-01 PROCEDURE — 81001 URINALYSIS AUTO W/SCOPE: CPT | Performed by: NURSE PRACTITIONER

## 2024-11-01 PROCEDURE — 99215 OFFICE O/P EST HI 40 MIN: CPT | Mod: PBBFAC | Performed by: NURSE PRACTITIONER

## 2024-11-01 PROCEDURE — 99212 OFFICE O/P EST SF 10 MIN: CPT | Mod: PBBFAC,27 | Performed by: DIETITIAN, REGISTERED

## 2024-11-01 RX ORDER — LANCETS
EACH MISCELLANEOUS
Qty: 200 EACH | Refills: 2 | Status: SHIPPED | OUTPATIENT
Start: 2024-11-01

## 2024-11-01 RX ORDER — INSULIN PUMP SYRINGE, 3 ML
EACH MISCELLANEOUS
Qty: 1 EACH | Refills: 0 | Status: SHIPPED | OUTPATIENT
Start: 2024-11-01 | End: 2025-11-01

## 2024-11-01 RX ORDER — ACETAMINOPHEN 500 MG
1 TABLET ORAL 2 TIMES DAILY PRN
Qty: 1 EACH | Refills: 0 | Status: SHIPPED | OUTPATIENT
Start: 2024-11-01 | End: 2024-11-01 | Stop reason: ALTCHOICE

## 2024-11-01 NOTE — TELEPHONE ENCOUNTER
Patient called would like a prescription for a Rollator walker. Explained to patient you were gone for the day. She asked if anyone else could do it informed her no only you because you are her doctor. Told patient the message would be addressed on Monday. Patient voiced understanding.

## 2024-11-02 LAB — BACTERIA STL CULT: NORMAL

## 2024-11-04 ENCOUNTER — TELEPHONE (OUTPATIENT)
Dept: FAMILY MEDICINE | Facility: CLINIC | Age: 64
End: 2024-11-04
Payer: MEDICAID

## 2024-11-04 VITALS — BODY MASS INDEX: 30.23 KG/M2 | WEIGHT: 160 LBS

## 2024-11-04 NOTE — TELEPHONE ENCOUNTER
----- Message from WALLY Coreas sent at 11/4/2024  9:41 AM CST -----  PLEASE CALL PATIENTS WITH RESULTS  Urinalysis no sign of infection, glucose noted in urine.  Please take diabetes medications as prescribed.    Regarding stool culture there is no sign of infection noted.

## 2024-11-04 NOTE — PROGRESS NOTES
Diabetes Care Specialist Progress Note  Author: Marycarmen Drake RD  Date: 11/4/2024    Intake    Program Intake  Reason for Diabetes Program Visit:: Post Program Follow-Up  Type of Follow-Up: Other    Current Diabetes Treatment: Diet/Exercise, DM Injectables, Oral Medications  Oral Medication Type/Dose: Farxiga 10 mg  DM Injectables Type/Dose: Ozempic .5mg    Continuous Glucose Monitoring  Patient has CGM: No    Lab Results   Component Value Date    HGBA1C 12.1 (H) 09/27/2024       Weight: 72.6 kg (160 lb)       Body mass index is 30.23 kg/m².    Lifestyle Coping Support & Clinical    Lifestyle/Coping/Support  Psychosocial/Coping Skills Assessment Completed: : No  Deffered due to:: Time  Area of need?: Deferred         Diabetes Self-Management Skills Assessment    Medication Skills Assessment  Medication Skills Assessment Completed:: Yes  Assessment indicates:: Instruction Needed  Area of need?: Yes    Diabetes Disease Process/Treatment Options  Diabetes Disease Process/Treatment Options: Skills Assessment Completed: No  Deferred due to:: Time  Area of need?: Deferred    Nutrition/Healthy Eating  Meal Plan 24 Hour Recall - Beverage: soda  Nutrition/Healthy Eating Skills Assessment Completed:: Yes  Assessment indicates:: Instruction Needed  Area of need?: Yes    Physical Activity/Exercise  Patient's daily activity level:: moderately active  Patient formally exercises outside of work.: yes  Frequency: three times a week  Patient can identify forms of physical activity.: yes  Physical Activity/Exercise Skills Assessment Completed: : Yes  Assessment indicates:: Adequate understanding  Area of need?: No    Home Blood Glucose Monitoring  Home Blood Glucose Monitoring Skills Assessment Completed: : No  Deferred due to:: Time  Area of need?: Deferred    Acute Complications  Acute Complications Skills Assessment Completed: : No  Deffered due to:: Time  Area of need?: Deferred    Chronic Complications  Chronic Complications  Skills Assessment Completed: : No  Deferred due to:: Time  Area of need?: Deferred      Assessment Summary and Plan    Based on today's diabetes care assessment, the following areas of need were identified:          11/4/2024   Areas of Need   Medications/Current Diabetes Treatment Yes - Discussed MOA, onset, side effects, dosage of meds.   Lifestyle Coping Support Deferred   Diabetes Disease Process/Treatment Options Deferred   Nutrition/Healthy Eating Yes - see care plan.   Physical Activity/Exercise No   Home Blood Glucose Monitoring Deferred   Acute Complications Deferred   Chronic Complications Deferred            Today's interventions were provided through individual discussion, instruction, and written materials were provided.      Patient verbalized understanding of instruction and written materials.  Pt was able to return back demonstration of instructions today. Patient understood key points, needs reinforcement and further instruction.     Diabetes Self-Management Care Plan:    Today's Diabetes Self-Management Care Plan was developed with Paola's input. Paola has agreed to work toward the following goal(s) to improve his/her overall diabetes control.      Care Plan: Diabetes Management   Updates made since 11/5/2023 12:00 AM        Problem: Blood Glucose Self-Monitoring Resolved 11/4/2024        Goal: Patient agrees to check blood sugar by scanning Lily sensor 3-4 times daily. Completed 11/4/2024   Start Date: 6/4/2022   Expected End Date: 7/7/2022   This Visit's Progress: Not met   Priority: High   Barriers: Lack of Motivation to Change   Note:    Reminders set on patient's reader to check blood glucose at 10:00 AM, 6:00 PM, and 2:00 AM per pt's preference.       Problem: Healthy Eating         Goal: Pt will eliminate sugary beverages    Start Date: 11/4/2024   Expected End Date: 12/16/2024   Priority: High   Barriers: No Barriers Identified        Task: Explained how to count carbohydrates using the  food label and the use of dry measuring cups for accurate carb counting. Completed 11/4/2024        Task: Recommended replacing beverages containing high sugar content with noncaloric/sugar free options and/or water. Completed 11/4/2024        Task: Review the importance of balancing carbohydrates with each meal using portion control techniques to count servings of carbohydrate and label reading to identify serving size and amount of total carbs per serving. Completed 11/4/2024        Task: Provided Sample plate method and reviewed the use of the plate to estimate amounts of carbohydrate per meal. Completed 11/4/2024          Follow Up Plan     Follow up if symptoms worsen or fail to improve.    Today's care plan and follow up schedule was discussed with patient.  Paola verbalized understanding of the care plan, goals, and agrees to follow up plan.        The patient was encouraged to communicate with his/her health care provider/physician and care team regarding his/her condition(s) and treatment.  I provided the patient with my contact information today and encouraged to contact me via phone or [facility] as needed.     Length of Visit   Total Time: 30 Minutes

## 2024-11-07 ENCOUNTER — TELEPHONE (OUTPATIENT)
Dept: FAMILY MEDICINE | Facility: CLINIC | Age: 64
End: 2024-11-07
Payer: MEDICAID

## 2024-11-07 NOTE — TELEPHONE ENCOUNTER
Called patient via phone call and patient understands results given. Patient asked if she is to complete cologuard? I advised patient to continue with instructions from her physician to complete cologuard.    ----- Message from WALLY Coreas sent at 11/4/2024  9:41 AM CST -----  PLEASE CALL PATIENTS WITH RESULTS  Urinalysis no sign of infection, glucose noted in urine.  Please take diabetes medications as prescribed.    Regarding stool culture there is no sign of infection noted.

## 2024-11-08 DIAGNOSIS — E11.65 TYPE 2 DIABETES MELLITUS WITH HYPERGLYCEMIA, WITHOUT LONG-TERM CURRENT USE OF INSULIN: ICD-10-CM

## 2024-11-08 RX ORDER — SEMAGLUTIDE 0.68 MG/ML
INJECTION, SOLUTION SUBCUTANEOUS
Qty: 3 ML | Refills: 0 | OUTPATIENT
Start: 2024-11-08

## 2024-11-08 NOTE — TELEPHONE ENCOUNTER
----- Message from WALLY Coreas sent at 11/7/2024  3:40 PM CST -----  PLEASE CALL PATIENTS WITH RESULT  Stool sample for parasite is negative.

## 2024-11-11 ENCOUNTER — TELEPHONE (OUTPATIENT)
Dept: FAMILY MEDICINE | Facility: CLINIC | Age: 64
End: 2024-11-11
Payer: MEDICAID

## 2024-11-11 NOTE — TELEPHONE ENCOUNTER
Called patient to give results. Patient verbalized understanding. No additional questions at this time.       ----- Message from WALLY Coreas sent at 11/7/2024  3:40 PM CST -----  PLEASE CALL PATIENTS WITH RESULT  Stool sample for parasite is negative.

## 2024-11-12 DIAGNOSIS — E11.65 TYPE 2 DIABETES MELLITUS WITH HYPERGLYCEMIA, WITHOUT LONG-TERM CURRENT USE OF INSULIN: ICD-10-CM

## 2024-11-12 RX ORDER — SEMAGLUTIDE 0.68 MG/ML
INJECTION, SOLUTION SUBCUTANEOUS
Qty: 3 ML | Refills: 0 | OUTPATIENT
Start: 2024-11-12

## 2024-11-12 NOTE — TELEPHONE ENCOUNTER
Patient called stating that she can not come and get the needles for her Ozempic pen. Patient would like you to send a prescription for the pens to [facility].

## 2024-11-13 NOTE — TELEPHONE ENCOUNTER
Spoke to patient regarding Bp. Advised patient that 104/69 BP was normal. Also advised patient that Dr. Feldman's office was trying to contact her and I gave her the number to call them.

## 2024-11-14 ENCOUNTER — TELEPHONE (OUTPATIENT)
Dept: FAMILY MEDICINE | Facility: CLINIC | Age: 64
End: 2024-11-14
Payer: MEDICAID

## 2024-11-15 ENCOUNTER — TELEPHONE (OUTPATIENT)
Dept: FAMILY MEDICINE | Facility: CLINIC | Age: 64
End: 2024-11-15
Payer: MEDICAID

## 2024-11-15 NOTE — TELEPHONE ENCOUNTER
Message has been sent to Jerardo regarding this .      ----- Message from Barbara sent at 11/15/2024  8:09 AM CST -----  Regarding: Refill  Provider: WALLY Coreas  Preferred Pharmacy: [address] [place] [address]      Last Visit:  Next Visit: 12/2/2024  Patient's Contact Number:    1. Name of Medication: Pen Needles  Dosage:  Comments:    2. Name of Medication:  Dosage:  Comments:    3. Name of Medication:  Dosage:  Comments    4. Name of Medication:  Dosage:  Comments:    5. Name of Medication:  Dosage:  Comments:

## 2024-11-15 NOTE — TELEPHONE ENCOUNTER
"Spoke to patient today at the clinic. Patient was very loud, demanding printout of labs. I once again printed out patient medications. Patient asked about Farxiga. I advised her that I spoke to the pharmacy and they state that they are running back the cameras to see if she got her medications. Patient states is Farxiga for Blood pressure. I advised her that it is not, it is for Diabetes. Patient states " Well I won't worry about it. I don't take the medication for diabetes anyway.  "

## 2024-11-15 NOTE — TELEPHONE ENCOUNTER
Great.  Patient 2nd prescription is 0.5 mg subQ every 7 days.  Patient needs to  prescription on November 18th.

## 2024-11-18 ENCOUNTER — TELEPHONE (OUTPATIENT)
Dept: FAMILY MEDICINE | Facility: CLINIC | Age: 64
End: 2024-11-18
Payer: MEDICAID

## 2024-11-18 DIAGNOSIS — E11.65 TYPE 2 DIABETES MELLITUS WITH HYPERGLYCEMIA, WITHOUT LONG-TERM CURRENT USE OF INSULIN: Primary | ICD-10-CM

## 2024-11-18 RX ORDER — SEMAGLUTIDE 0.68 MG/ML
0.5 INJECTION, SOLUTION SUBCUTANEOUS
Qty: 3 ML | Refills: 0 | Status: SHIPPED | OUTPATIENT
Start: 2024-11-18

## 2024-11-18 NOTE — TELEPHONE ENCOUNTER
Spoke to [facility] and they state that they did not receive a script for her Ozempic even tho you wrote the script on 9/30. Patient also requesting pen needles.      ----- Message from Barbara sent at 11/18/2024  3:09 PM CST -----  Regarding: Resend script for Ozempic  General Phone Message    Caller is:  ( x) Patient  (  ) Family  ( x ) Pharmacy    (  ) Home Health  (  ) Other     Provider: WALLY Meléndez    Last Visit:    Next Visit: 12/2/2024    Reason for Call: Patient was on the line with [facility]- The pharmacy states that they didn't receive the script for Ozempic. They are asking Jerardo to resend it. The patient is also requesting the pen needles that go with the pen.                   Best Time to Contact:    Preferred Phone Number:

## 2024-11-19 NOTE — TELEPHONE ENCOUNTER
Prescription was sent yesterday.  Please check in the front to see if patient picked up the box of pins that was provided for Ozempic pen.

## 2024-12-02 ENCOUNTER — OFFICE VISIT (OUTPATIENT)
Dept: FAMILY MEDICINE | Facility: CLINIC | Age: 64
End: 2024-12-02
Payer: MEDICAID

## 2024-12-02 VITALS
OXYGEN SATURATION: 98 % | WEIGHT: 158 LBS | SYSTOLIC BLOOD PRESSURE: 130 MMHG | RESPIRATION RATE: 18 BRPM | DIASTOLIC BLOOD PRESSURE: 80 MMHG | HEIGHT: 61 IN | BODY MASS INDEX: 29.83 KG/M2 | TEMPERATURE: 98 F | HEART RATE: 98 BPM

## 2024-12-02 DIAGNOSIS — I10 PRIMARY HYPERTENSION: ICD-10-CM

## 2024-12-02 DIAGNOSIS — E11.65 TYPE 2 DIABETES MELLITUS WITH HYPERGLYCEMIA, WITHOUT LONG-TERM CURRENT USE OF INSULIN: Primary | ICD-10-CM

## 2024-12-02 LAB
EST. AVERAGE GLUCOSE BLD GHB EST-MCNC: 240.3 MG/DL
HBA1C MFR BLD: 10 %

## 2024-12-02 PROCEDURE — 99214 OFFICE O/P EST MOD 30 MIN: CPT | Mod: S$PBB,,, | Performed by: NURSE PRACTITIONER

## 2024-12-02 PROCEDURE — 99215 OFFICE O/P EST HI 40 MIN: CPT | Mod: PBBFAC | Performed by: NURSE PRACTITIONER

## 2024-12-02 PROCEDURE — 3079F DIAST BP 80-89 MM HG: CPT | Mod: CPTII,,, | Performed by: NURSE PRACTITIONER

## 2024-12-02 PROCEDURE — 1160F RVW MEDS BY RX/DR IN RCRD: CPT | Mod: CPTII,,, | Performed by: NURSE PRACTITIONER

## 2024-12-02 PROCEDURE — 1159F MED LIST DOCD IN RCRD: CPT | Mod: CPTII,,, | Performed by: NURSE PRACTITIONER

## 2024-12-02 PROCEDURE — 3008F BODY MASS INDEX DOCD: CPT | Mod: CPTII,,, | Performed by: NURSE PRACTITIONER

## 2024-12-02 PROCEDURE — 36415 COLL VENOUS BLD VENIPUNCTURE: CPT | Performed by: NURSE PRACTITIONER

## 2024-12-02 PROCEDURE — 3075F SYST BP GE 130 - 139MM HG: CPT | Mod: CPTII,,, | Performed by: NURSE PRACTITIONER

## 2024-12-02 PROCEDURE — 83036 HEMOGLOBIN GLYCOSYLATED A1C: CPT | Performed by: NURSE PRACTITIONER

## 2024-12-02 PROCEDURE — 84681 ASSAY OF C-PEPTIDE: CPT | Performed by: NURSE PRACTITIONER

## 2024-12-02 PROCEDURE — 3066F NEPHROPATHY DOC TX: CPT | Mod: CPTII,,, | Performed by: NURSE PRACTITIONER

## 2024-12-02 PROCEDURE — 4010F ACE/ARB THERAPY RXD/TAKEN: CPT | Mod: CPTII,,, | Performed by: NURSE PRACTITIONER

## 2024-12-02 PROCEDURE — 3046F HEMOGLOBIN A1C LEVEL >9.0%: CPT | Mod: CPTII,,, | Performed by: NURSE PRACTITIONER

## 2024-12-02 PROCEDURE — 3060F POS MICROALBUMINURIA REV: CPT | Mod: CPTII,,, | Performed by: NURSE PRACTITIONER

## 2024-12-02 PROCEDURE — 86341 ISLET CELL ANTIBODY: CPT | Performed by: NURSE PRACTITIONER

## 2024-12-02 RX ORDER — GLIPIZIDE 5 MG/1
5 TABLET, FILM COATED, EXTENDED RELEASE ORAL
Qty: 30 TABLET | Refills: 3 | Status: SHIPPED | OUTPATIENT
Start: 2024-12-02

## 2024-12-02 NOTE — PATIENT INSTRUCTIONS
Curtis Guevara,     If you are due for any health screening(s) below please notify me so we can arrange them to be ordered and scheduled. Most healthy patients at your age complete them, but you are free to accept or refuse.     If you can't do it, I'll definitely understand. If you can, I'd certainly appreciate it!    Tests to Keep You Healthy    Mammogram: Met on 5/1/2024  Eye Exam: Met on 11/7/2024  Colon Cancer Screening: ORDERED  Last Blood Pressure <= 139/89 (11/1/2024): Yes  Last HbA1c < 8 (09/27/2024): NO      Its time for your colon cancer screening     Colorectal cancer is one of the leading causes of cancer death for men and women but it doesnt have to be. Screenings can prevent colorectal cancer or find it early enough to treat and cure the disease.     Our records indicate that you may be overdue for colon cancer screening. A colonoscopy or stool screening test can help identify patients at risk for developing colon cancer. Cancer screenings save lives, so schedule yours today to stay healthy.     A colonoscopy is the preferred test for detecting colon cancer. It is needed only once every 10 years if results are negative. While you are sedated, a flexible, lighted tube with a tiny camera is inserted into the rectum and advanced through the colon to look for cancers.     An alternative screening test that is used at home and returned to the lab may also be used. It detects hidden blood in bowel movements which could indicate cancer in the colon. If results are positive, you will need a colonoscopy to determine if the blood is a sign of cancer. This type of follow up (diagnostic) colonoscopy usually requires additional copays as required by your insurance provider.     If you recently had your colon cancer screening performed outside of [facility], please let your Health care team know so that they can update your health record. Please contact your PCP if you have any questions.    Lets manage  your A1c levels     Your last hemoglobin A1c was higher than the goal of less than 8. Hemoglobin A1c or HbA1c is a blood test that measures your average blood sugar levels over the past 3 months. It is the main test to help you and your health care team manage your diabetes.     Higher A1c levels are linked to diabetes complications, such as loss of vision, kidney disease, and nerve damage. Keeping your A1c at least less than 8 is important to stay healthy and we are here to help. Talk with your provider on how you can further improve your A1c.     We recommend that you set up blood work to get a repeat hemoglobin A1c in 3 months to monitor your diabetes. Let your health care team know if you have questions.

## 2024-12-02 NOTE — ASSESSMENT & PLAN NOTE
Patient currently compliant with Ozempic 0.5 mg subQ every 7 days and Farxiga 10 mg p.o. once daily.  Patient brought blood glucose log sheet, see document under media.  Patient state she has been noticing Diarrhea with every ozempic injection.  Advise patient to stop Ozempic.  Discussed with patient will start glipizide 5 mg extended release p.o. once daily with food.  Patient state she does not eat breakfast in the morning.  Discussed with patient she can take her dose of glipizide at lunchtime.  Patient agreed to plan.   Continue Farxiga 10 mg p.o. once daily.  Patient state she is going on a [place] cruise next week.  Advise patient to monitor blood glucose.  Avoid sweets, desserts, sugars, juices.  Minimize high carbs such as pastas, bread, rice.  Consume serving size.  Utilize exercise facility to help control elevated sugars by exercising at least 30-40 minutes a day as simple as brisk walking and as tolerated.  Minimize high fats intake such as red meat, fried foods, Rich sauce.  Stay hydrated with water.  Reinforced fasting blood glucose should be between .  Blood glucose 2 hours after eating should be less than 180.  Read discharge education materials to help you with serving size and better control of blood glucose during vacation.  Patient agreed to complete C-peptide and GED 65 antibodies to rule out type 1 diabetes.  Return to clinic in 3 months or sooner if needed.  Continue blood pressure medications as prescribed.  Make sure you have enough medications while you are on a vacation.  Request refills before going on a vacation.  Please read discharge education materials provided for diabetes and serving size of food.  Questions solicited and answered, patient verbalized understanding and agreed to plan of care.

## 2024-12-02 NOTE — PROGRESS NOTES
Patient Name: Paola Cm   : 1960  MRN: [id number]     SUBJECTIVE DATA:    CHIEF COMPLAINT:   Paola Cm is a 63 y.o. female who presents to clinic today with Diabetes        HPI:  63-year-old female presents to the clinic to follow-up on diabetes.  Patient currently compliant with Ozempic 0.5 mg subQ every 7 days and Farxiga 10 mg p.o. once daily.    2024:  Last hemoglobin A1c 12.1 % was completed on 2024   Diabetes: Patient state she is compliant with Ozempic 0.25 mg subQ every 7 days and requesting to help her to give scheduled injection of Ozempic with the presence of her daughter.  Education provided and demonstrated.  Injection given.   Patient to continue with current diabetic medications.  Patient to keep her appointment on December 3, 2024 for diabetes follow-up.  Diabetes monitor with supplies sent to preferred pharmacy.  Diabetes goal fasting between .  Diabetes goal 2 hours after a meal should be less than 180.  Patient requesting CBG at bedside, .  Continue to follow diabetic diet meal planning and stay physically active.  Questions solicited and answered.  Patient and her daughter verbalized understanding.    2024  Diabetes: Patient currently compliant with Ozempic 0.5 mg subQ every 7 days and Farxiga 10 mg p.o. once daily.  Patient brought blood glucose log sheet, see document under media.  Patient state she has been noticing Diarrhea with every ozempic injection.  Advise patient to stop Ozempic.  Discussed with patient will start glipizide 5 mg extended release p.o. once daily with food.  Patient state she does not eat breakfast in the morning.  Discussed with patient she can take her dose of glipizide at lunchtime.  Patient agreed to plan.   Continue Farxiga 10 mg p.o. once daily.  Patient state she is going on a [place] cruise next week.  Advise patient to monitor blood glucose.  Avoid sweets, desserts, sugars, juices.  Minimize high carbs such as pastas,  "bread, rice.  Consume serving size.  Utilize exercise facility to help control elevated sugars by exercising at least 30-40 minutes a day as simple as brisk walking and as tolerated.  Minimize high fats intake such as red meat, fried foods, Rich sauce.  Stay hydrated with water.  Reinforced fasting blood glucose should be between .  Blood glucose 2 hours after eating should be less than 180.  Read discharge education materials to help you with serving size and better control of blood glucose during vacation.  Patient agreed to complete C-peptide and GED 65 antibodies to rule out type 1 diabetes.  Return to clinic in 3 months or sooner if needed.  Continue blood pressure medications as prescribed.  Make sure you have enough medications while you are on a vacation.  Request refills before going on a vacation.  Please read discharge education materials provided for diabetes and serving size of food.  Questions solicited and answered, patient verbalized understanding and agreed to plan of care.    Addendum:  Hemoglobin A1c 10%.  Start glipizide 5 mg p.o. once daily with food.    Patient denies chest pain, shortness of breath, dyspnea on exertion, palpitations, peripheral edema, abdominal pain, nausea, vomiting, constipation, fatigue, fever, chills, dysuria,  hematuria, dark stools or bloody stools.    Care gaps:  Reminded patient to complete Cologuard and to send sample back to process by mail.  Patient verbalized understanding.     Keep appointment with gastroenterologist Dr. Feldman.          ALLERGIES:   Review of patient's allergies indicates:   Allergen Reactions    Latex Itching         ROS:  Review of Systems   All other systems reviewed and are negative.        OBJECTIVE DATA:  Vital signs  Vitals:    12/02/24 1518   BP: 130/80   Pulse: 98   Resp: 18   Temp: 97.8 °F (36.6 °C)   TempSrc: Oral   SpO2: 98%   Weight: 71.7 kg (158 lb)   Height: 5' 1" (1.549 m)      Body mass index is 29.85 kg/m².    PHYSICAL " EXAM:   Physical Exam  Vitals and nursing note reviewed.   Constitutional:       General: She is awake. She is not in acute distress.     Appearance: Normal appearance. She is well-developed and well-groomed. She is not ill-appearing, toxic-appearing or diaphoretic.   HENT:      Head: Normocephalic and atraumatic.      Right Ear: External ear normal.      Left Ear: External ear normal.      Nose: Nose normal.      Mouth/Throat:      Mouth: Mucous membranes are moist.      Pharynx: Oropharynx is clear.   Eyes:      General: Lids are normal.      Extraocular Movements: Extraocular movements intact.      Pupils: Pupils are equal, round, and reactive to light.   Cardiovascular:      Rate and Rhythm: Normal rate and regular rhythm.      Pulses: Normal pulses.      Heart sounds: Normal heart sounds.   Pulmonary:      Effort: Pulmonary effort is normal.      Breath sounds: Normal breath sounds.   Abdominal:      General: Abdomen is flat.   Musculoskeletal:         General: Normal range of motion.      Cervical back: Normal range of motion and neck supple.   Skin:     General: Skin is warm and dry.      Capillary Refill: Capillary refill takes less than 2 seconds.   Neurological:      General: No focal deficit present.      Mental Status: She is alert and oriented to person, place, and time. Mental status is at baseline.      Cranial Nerves: No cranial nerve deficit.      Sensory: No sensory deficit.      Motor: No weakness.      Coordination: Coordination normal.      Gait: Gait normal.   Psychiatric:         Attention and Perception: Attention and perception normal.         Mood and Affect: Mood and affect normal.         Speech: Speech normal.         Behavior: Behavior normal. Behavior is cooperative.         Thought Content: Thought content normal.         Cognition and Memory: Cognition and memory normal.         Judgment: Judgment normal.          ASSESSMENT/PLAN:  1. Type 2 diabetes mellitus with hyperglycemia,  without long-term current use of insulin  Assessment & Plan:  Patient currently compliant with Ozempic 0.5 mg subQ every 7 days and Farxiga 10 mg p.o. once daily.  Patient brought blood glucose log sheet, see document under media.  Patient state she has been noticing Diarrhea with every ozempic injection.  Advise patient to stop Ozempic.  Discussed with patient will start glipizide 5 mg extended release p.o. once daily with food.  Patient state she does not eat breakfast in the morning.  Discussed with patient she can take her dose of glipizide at lunchtime.  Patient agreed to plan.   Continue Farxiga 10 mg p.o. once daily.  Patient state she is going on a [employer] cruise next week.  Advise patient to monitor blood glucose.  Avoid sweets, desserts, sugars, juices.  Minimize high carbs such as pastas, bread, rice.  Consume serving size.  Utilize exercise facility to help control elevated sugars by exercising at least 30-40 minutes a day as simple as brisk walking and as tolerated.  Minimize high fats intake such as red meat, fried foods, Rich sauce.  Stay hydrated with water.  Reinforced fasting blood glucose should be between .  Blood glucose 2 hours after eating should be less than 180.  Read discharge education materials to help you with serving size and better control of blood glucose during vacation.  Patient agreed to complete C-peptide and GED 65 antibodies to rule out type 1 diabetes.  Return to clinic in 3 months or sooner if needed.  Continue blood pressure medications as prescribed.  Make sure you have enough medications while you are on a vacation.  Request refills before going on a vacation.  Please read discharge education materials provided for diabetes and serving size of food.  Questions solicited and answered, patient verbalized understanding and agreed to plan of care.    Orders:  -     Hemoglobin A1C  -     C-Peptide  -     GAD65 Ab, Serum  -     glipiZIDE 5 MG TR24; Take 1 tablet (5 mg total)  by mouth daily with breakfast.  Dispense: 30 tablet; Refill: 3    2. Primary hypertension  Assessment & Plan:  Controlled.  Blood pressure 130/80.  Continue valsartan 80 mg p.o. once daily.    Continue amlodipine 10 mg p.o. once daily.  Follow low-salt diet less than 2 g per day.  Follow discharge instructions as discussed under diabetes.  Stay hydrated with water.  Patient verbalized understanding             RESULTS:  Recent Results (from the past 6 weeks)   Urinalysis, Reflex to Urine Culture    Collection Time: 11/01/24  9:09 AM    Specimen: Urine   Result Value Ref Range    Color, UA Light-Yellow Yellow, Light-Yellow, Dark Yellow, Mitali, Straw    Appearance, UA Clear Clear    Specific Gravity, UA 1.029 1.005 - 1.030    pH, UA 5.0 5.0 - 8.5    Protein, UA Negative Negative    Glucose, UA 4+ (A) Negative, Normal    Ketones, UA Negative Negative    Blood, UA Negative Negative    Bilirubin, UA Negative Negative    Urobilinogen, UA Normal 0.2, 1.0, Normal    Nitrites, UA Negative Negative    Leukocyte Esterase, UA Negative Negative    RBC, UA 0-5 None Seen, 0-2, 3-5, 0-5 /HPF    WBC, UA 0-5 None Seen, 0-2, 3-5, 0-5 /HPF    Bacteria, UA None Seen None Seen /HPF    Squamous Epithelial Cells, UA Trace (A) None Seen /HPF    Mucous, UA Trace (A) None Seen /LPF    Hyaline Casts, UA None Seen None Seen /lpf   POCT Glucose, Hand-Held Device    Collection Time: 11/01/24  9:09 AM   Result Value Ref Range    POC Glucose 239 (A) 70 - 110 MG/DL   Stool Culture    Collection Time: 11/01/24 12:32 PM    Specimen: Stool   Result Value Ref Range    Stool Culture       Negative for Salmonella, Shigella, Campylobacter, Vibrio, Aeromonas, Pleisiomonas,Yersinia, or Shiga Toxin 1 and 2.   Stool Exam-Ova,Cysts,Parasites    Collection Time: 11/01/24 12:32 PM   Result Value Ref Range    Ova and Parasite, Microscopy, F SEE COMMENTS    Hemoglobin A1C    Collection Time: 12/02/24  3:42 PM   Result Value Ref Range    Hemoglobin A1c 10.0 (H)  <=7.0 %    Estimated Average Glucose 240.3 mg/dL         Follow Up:  Follow up in about 3 months (around 3/2/2025).      Previous medical history/lab work/radiology reviewed and considered during medical management decisions.   Medication list reviewed and medication reconciliation performed.  Patient was provided  and care about his/her current diagnosis (es) and medications including risk/benefit and side effects/adverse events, over the counter medication uses/doses, home self-care and contact precautions,  and red flags and indications for when to seek immediate medical attention.   Patient was advised to continue compliance with current medication list and medical recommendations.  Patient dvised continued compliance with recommended eating habits/ diets for medical conditions and exercise 150 minutes/ week (if possible) for medical condition (s).  Educational handouts and instructions on selected disease management in AVS (After Visit Summary).    All of the patient's questions were answered to patient's satisfaction.   The patient was receptive, expressed verbal understanding and agreement the above plan.          This note was created with the assistance of a voice recognition software or phone dictation. There may be transcription errors as a result of using this technology however minimal. Effort has been made to assure accuracy of transcription but any obvious errors or omissions should be clarified with the author of the document

## 2024-12-02 NOTE — ASSESSMENT & PLAN NOTE
Controlled.  Blood pressure 130/80.  Continue valsartan 80 mg p.o. once daily.    Continue amlodipine 10 mg p.o. once daily.  Follow low-salt diet less than 2 g per day.  Follow discharge instructions as discussed under diabetes.  Stay hydrated with water.  Patient verbalized understanding

## 2024-12-03 ENCOUNTER — TELEPHONE (OUTPATIENT)
Dept: ORTHOPEDICS | Facility: CLINIC | Age: 64
End: 2024-12-03
Payer: MEDICAID

## 2024-12-03 NOTE — PROGRESS NOTES
PLEASE CALL PATIENTS WITH RESULTS,  Hemoglobin A1c 10%.  Start glipizide 5 mg p.o. once daily with food.

## 2024-12-04 ENCOUNTER — TELEPHONE (OUTPATIENT)
Dept: FAMILY MEDICINE | Facility: CLINIC | Age: 64
End: 2024-12-04
Payer: MEDICAID

## 2024-12-04 LAB — C PEPTIDE P FAST SERPL-MCNC: 8.2 NG/ML (ref 1.1–4.4)

## 2024-12-04 NOTE — TELEPHONE ENCOUNTER
Informed patient that Hemoglobin A1c 10%.  Start glipizide 5 mg p.o. once daily with food.   Patient voiced understanding about results and to start glipizide.       ----- Message from WALLY Coreas sent at 12/3/2024 12:45 PM CST -----  PLEASE CALL PATIENTS WITH RESULTS,  Hemoglobin A1c 10%.  Start glipizide 5 mg p.o. once daily with food.

## 2024-12-05 ENCOUNTER — TELEPHONE (OUTPATIENT)
Dept: FAMILY MEDICINE | Facility: CLINIC | Age: 64
End: 2024-12-05
Payer: MEDICAID

## 2024-12-05 LAB — GAD65 AB SER-SCNC: 0 NMOL/L

## 2024-12-05 NOTE — TELEPHONE ENCOUNTER
Called patient to give results. Patient verbalized understanding. No additional questions at this time.   ----- Message from WALLY Coreas sent at 12/5/2024  1:00 PM CST -----  PLEASE CALL PATIENTS WITH RESULT  C-peptide shows type 2 diabetes.  Take oral medications as prescribed.  Keep your follow-up appointment.

## 2024-12-05 NOTE — PROGRESS NOTES
PLEASE CALL PATIENTS WITH RESULT  C-peptide shows type 2 diabetes.  Take oral medications as prescribed.  Keep your follow-up appointment.

## 2025-01-02 ENCOUNTER — TELEPHONE (OUTPATIENT)
Dept: ORTHOPEDICS | Facility: CLINIC | Age: 65
End: 2025-01-02
Payer: MEDICAID

## 2025-01-03 ENCOUNTER — TELEPHONE (OUTPATIENT)
Dept: FAMILY MEDICINE | Facility: CLINIC | Age: 65
End: 2025-01-03
Payer: MEDICAID

## 2025-01-03 NOTE — TELEPHONE ENCOUNTER
Patient called requesting an order for a walk in tub.  Patient was advised to contact Orthopedics with this matter. Patient was advised by Orthopedics to get order from her primary care doctor.

## 2025-01-13 ENCOUNTER — TELEPHONE (OUTPATIENT)
Dept: FAMILY MEDICINE | Facility: CLINIC | Age: 65
End: 2025-01-13
Payer: MEDICAID

## 2025-01-13 NOTE — TELEPHONE ENCOUNTER
Patient called requesting order for walk in tub again. Advised patient that per NP, she did not feel that Medicaid would pay for a walk in tub. Patient was offered a shower chair or bench. Patient states she does not want either of these. She states that she spoke with someone at [employer] and they state that they will pay for it. She states that she was told to put it in a portal. I advised patient that when it comes to DME we do not place orders in a portal. We fax orders to DME supply companies. I also advised patient that I am unfamiliar with any kind of portal for this matter. Patient is Insistent on getting an order placed for a walk in tub being placed in a portal. Explained to patient several times that I am unfamiliar with this process and that we do not use it. Also explained to patient how highly unlikely it is for Medicaid to pay for it.

## 2025-01-14 NOTE — TELEPHONE ENCOUNTER
Spoke to patient about walk in tub. Advised patient that this is a home improvement and the NP will  not  order the tub. Patient still adamant about order being written. Advised patient that it will not be written. Patient asked to speak to provider, I advised patient that this is not how it works. Patient states that she will call someone else to speak to NP.

## 2025-01-17 ENCOUNTER — TELEPHONE (OUTPATIENT)
Dept: INTERNAL MEDICINE | Facility: CLINIC | Age: 65
End: 2025-01-17
Payer: MEDICAID

## 2025-01-17 DIAGNOSIS — M17.0 PRIMARY OSTEOARTHRITIS OF BOTH KNEES: Primary | ICD-10-CM

## 2025-01-17 NOTE — TELEPHONE ENCOUNTER
Patient is requesting a walk in tub due to knee pain. Patient states she spoke with a medicaid rep and alleged that medicaid will pay for the walk in tub. Will order walk in tub order and send to [facility].

## 2025-04-14 ENCOUNTER — OFFICE VISIT (OUTPATIENT)
Dept: INTERNAL MEDICINE | Facility: CLINIC | Age: 65
End: 2025-04-14
Payer: MEDICAID

## 2025-04-14 ENCOUNTER — RESULTS FOLLOW-UP (OUTPATIENT)
Dept: INTERNAL MEDICINE | Facility: CLINIC | Age: 65
End: 2025-04-14

## 2025-04-14 ENCOUNTER — LAB VISIT (OUTPATIENT)
Dept: LAB | Facility: HOSPITAL | Age: 65
End: 2025-04-14
Attending: NURSE PRACTITIONER
Payer: MEDICAID

## 2025-04-14 VITALS
SYSTOLIC BLOOD PRESSURE: 135 MMHG | RESPIRATION RATE: 18 BRPM | DIASTOLIC BLOOD PRESSURE: 85 MMHG | HEIGHT: 61 IN | TEMPERATURE: 46 F | BODY MASS INDEX: 29.83 KG/M2 | WEIGHT: 158 LBS | HEART RATE: 87 BPM

## 2025-04-14 DIAGNOSIS — Z11.4 SCREENING FOR HIV (HUMAN IMMUNODEFICIENCY VIRUS): ICD-10-CM

## 2025-04-14 DIAGNOSIS — Z11.3 SCREENING EXAMINATION FOR STD (SEXUALLY TRANSMITTED DISEASE): ICD-10-CM

## 2025-04-14 DIAGNOSIS — E11.65 TYPE 2 DIABETES MELLITUS WITH HYPERGLYCEMIA, WITHOUT LONG-TERM CURRENT USE OF INSULIN: ICD-10-CM

## 2025-04-14 DIAGNOSIS — N39.0 URINARY TRACT INFECTION WITHOUT HEMATURIA, SITE UNSPECIFIED: Primary | ICD-10-CM

## 2025-04-14 DIAGNOSIS — Z00.00 WELLNESS EXAMINATION: ICD-10-CM

## 2025-04-14 DIAGNOSIS — Z12.31 BREAST CANCER SCREENING BY MAMMOGRAM: ICD-10-CM

## 2025-04-14 DIAGNOSIS — Z00.00 WELLNESS EXAMINATION: Primary | ICD-10-CM

## 2025-04-14 LAB
25(OH)D3+25(OH)D2 SERPL-MCNC: 20 NG/ML (ref 30–80)
ALBUMIN SERPL-MCNC: 3.5 G/DL (ref 3.4–4.8)
ALBUMIN/GLOB SERPL: 0.6 RATIO (ref 1.1–2)
ALP SERPL-CCNC: 206 UNIT/L (ref 40–150)
ALT SERPL-CCNC: 19 UNIT/L (ref 0–55)
ANION GAP SERPL CALC-SCNC: 9 MEQ/L
AST SERPL-CCNC: 19 UNIT/L (ref 11–45)
BACTERIA #/AREA URNS AUTO: ABNORMAL /HPF
BASOPHILS # BLD AUTO: 0.1 X10(3)/MCL
BASOPHILS NFR BLD AUTO: 0.9 %
BILIRUB SERPL-MCNC: 0.5 MG/DL
BILIRUB UR QL STRIP.AUTO: NEGATIVE
BUN SERPL-MCNC: 23.9 MG/DL (ref 9.8–20.1)
CALCIUM SERPL-MCNC: 10 MG/DL (ref 8.4–10.2)
CHLORIDE SERPL-SCNC: 105 MMOL/L (ref 98–107)
CHOLEST SERPL-MCNC: 191 MG/DL
CHOLEST/HDLC SERPL: 6 {RATIO} (ref 0–5)
CLARITY UR: ABNORMAL
CO2 SERPL-SCNC: 23 MMOL/L (ref 23–31)
COLOR UR AUTO: YELLOW
CREAT SERPL-MCNC: 1.19 MG/DL (ref 0.55–1.02)
CREAT UR-MCNC: 41.7 MG/DL (ref 45–106)
CREAT/UREA NIT SERPL: 20
EOSINOPHIL # BLD AUTO: 0.28 X10(3)/MCL (ref 0–0.9)
EOSINOPHIL NFR BLD AUTO: 2.5 %
ERYTHROCYTE [DISTWIDTH] IN BLOOD BY AUTOMATED COUNT: 12.8 % (ref 11.5–17)
EST. AVERAGE GLUCOSE BLD GHB EST-MCNC: 277.6 MG/DL
EST. AVERAGE GLUCOSE BLD GHB EST-MCNC: 277.6 MG/DL
GFR SERPLBLD CREATININE-BSD FMLA CKD-EPI: 51 ML/MIN/1.73/M2
GLOBULIN SER-MCNC: 5.4 GM/DL (ref 2.4–3.5)
GLUCOSE SERPL-MCNC: 370 MG/DL (ref 82–115)
GLUCOSE UR QL STRIP: ABNORMAL
HAV IGM SERPL QL IA: NONREACTIVE
HBA1C MFR BLD: 11.3 %
HBA1C MFR BLD: 11.3 %
HBV CORE IGM SERPL QL IA: NONREACTIVE
HBV SURFACE AG SERPL QL IA: NONREACTIVE
HCT VFR BLD AUTO: 41.6 % (ref 37–47)
HCV AB SERPL QL IA: NONREACTIVE
HDLC SERPL-MCNC: 33 MG/DL (ref 35–60)
HGB BLD-MCNC: 13.5 G/DL (ref 12–16)
HGB UR QL STRIP: ABNORMAL
HIV 1+2 AB+HIV1 P24 AG SERPL QL IA: NONREACTIVE
HYALINE CASTS #/AREA URNS LPF: ABNORMAL /LPF
IMM GRANULOCYTES # BLD AUTO: 0.04 X10(3)/MCL (ref 0–0.04)
IMM GRANULOCYTES NFR BLD AUTO: 0.4 %
KETONES UR QL STRIP: NEGATIVE
LDLC SERPL CALC-MCNC: 127 MG/DL (ref 50–140)
LEUKOCYTE ESTERASE UR QL STRIP: 500
LYMPHOCYTES # BLD AUTO: 2.85 X10(3)/MCL (ref 0.6–4.6)
LYMPHOCYTES NFR BLD AUTO: 25.1 %
MCH RBC QN AUTO: 28.2 PG (ref 27–31)
MCHC RBC AUTO-ENTMCNC: 32.5 G/DL (ref 33–36)
MCV RBC AUTO: 86.8 FL (ref 80–94)
MICROALBUMIN UR-MCNC: 41.3 UG/ML
MICROALBUMIN/CREAT RATIO PNL UR: 99 MG/GM CR (ref 0–30)
MONOCYTES # BLD AUTO: 0.58 X10(3)/MCL (ref 0.1–1.3)
MONOCYTES NFR BLD AUTO: 5.1 %
NEUTROPHILS # BLD AUTO: 7.5 X10(3)/MCL (ref 2.1–9.2)
NEUTROPHILS NFR BLD AUTO: 66 %
NITRITE UR QL STRIP: NEGATIVE
NRBC BLD AUTO-RTO: 0 %
PH UR STRIP: 5 [PH]
PLATELET # BLD AUTO: 326 X10(3)/MCL (ref 130–400)
PMV BLD AUTO: 11.9 FL (ref 7.4–10.4)
POTASSIUM SERPL-SCNC: 4.1 MMOL/L (ref 3.5–5.1)
PROT SERPL-MCNC: 8.9 GM/DL (ref 5.8–7.6)
PROT UR QL STRIP: ABNORMAL
RBC # BLD AUTO: 4.79 X10(6)/MCL (ref 4.2–5.4)
RBC #/AREA URNS AUTO: ABNORMAL /HPF
SODIUM SERPL-SCNC: 137 MMOL/L (ref 136–145)
SP GR UR STRIP.AUTO: 1.03 (ref 1–1.03)
SQUAMOUS #/AREA URNS LPF: ABNORMAL /HPF
T4 FREE SERPL-MCNC: 0.88 NG/DL (ref 0.7–1.48)
TRIGL SERPL-MCNC: 157 MG/DL (ref 37–140)
TSH SERPL-ACNC: 0.83 UIU/ML (ref 0.35–4.94)
UROBILINOGEN UR STRIP-ACNC: NORMAL
VLDLC SERPL CALC-MCNC: 31 MG/DL
WBC # BLD AUTO: 11.35 X10(3)/MCL (ref 4.5–11.5)
WBC #/AREA URNS AUTO: >100 /HPF

## 2025-04-14 PROCEDURE — 81015 MICROSCOPIC EXAM OF URINE: CPT

## 2025-04-14 PROCEDURE — 1159F MED LIST DOCD IN RCRD: CPT | Mod: CPTII,,, | Performed by: NURSE PRACTITIONER

## 2025-04-14 PROCEDURE — 82570 ASSAY OF URINE CREATININE: CPT

## 2025-04-14 PROCEDURE — 80061 LIPID PANEL: CPT

## 2025-04-14 PROCEDURE — 3008F BODY MASS INDEX DOCD: CPT | Mod: CPTII,,, | Performed by: NURSE PRACTITIONER

## 2025-04-14 PROCEDURE — 80053 COMPREHEN METABOLIC PANEL: CPT

## 2025-04-14 PROCEDURE — 83036 HEMOGLOBIN GLYCOSYLATED A1C: CPT

## 2025-04-14 PROCEDURE — 87086 URINE CULTURE/COLONY COUNT: CPT

## 2025-04-14 PROCEDURE — 85025 COMPLETE CBC W/AUTO DIFF WBC: CPT

## 2025-04-14 PROCEDURE — 1160F RVW MEDS BY RX/DR IN RCRD: CPT | Mod: CPTII,,, | Performed by: NURSE PRACTITIONER

## 2025-04-14 PROCEDURE — 83036 HEMOGLOBIN GLYCOSYLATED A1C: CPT | Mod: 91

## 2025-04-14 PROCEDURE — 82306 VITAMIN D 25 HYDROXY: CPT

## 2025-04-14 PROCEDURE — 84443 ASSAY THYROID STIM HORMONE: CPT

## 2025-04-14 PROCEDURE — 36415 COLL VENOUS BLD VENIPUNCTURE: CPT

## 2025-04-14 PROCEDURE — 4010F ACE/ARB THERAPY RXD/TAKEN: CPT | Mod: CPTII,,, | Performed by: NURSE PRACTITIONER

## 2025-04-14 PROCEDURE — 80074 ACUTE HEPATITIS PANEL: CPT

## 2025-04-14 PROCEDURE — 99396 PREV VISIT EST AGE 40-64: CPT | Mod: S$PBB,,, | Performed by: NURSE PRACTITIONER

## 2025-04-14 PROCEDURE — 87389 HIV-1 AG W/HIV-1&-2 AB AG IA: CPT

## 2025-04-14 PROCEDURE — 99214 OFFICE O/P EST MOD 30 MIN: CPT | Mod: PBBFAC | Performed by: NURSE PRACTITIONER

## 2025-04-14 PROCEDURE — 3079F DIAST BP 80-89 MM HG: CPT | Mod: CPTII,,, | Performed by: NURSE PRACTITIONER

## 2025-04-14 PROCEDURE — 84439 ASSAY OF FREE THYROXINE: CPT

## 2025-04-14 PROCEDURE — 3075F SYST BP GE 130 - 139MM HG: CPT | Mod: CPTII,,, | Performed by: NURSE PRACTITIONER

## 2025-04-14 RX ORDER — ERGOCALCIFEROL 1.25 MG/1
50000 CAPSULE ORAL
COMMUNITY
Start: 2024-12-30 | End: 2025-04-14 | Stop reason: ALTCHOICE

## 2025-04-14 NOTE — ASSESSMENT & PLAN NOTE
A1C Today  F/U next week for lab review  Diabetes Medications              dapagliflozin propanediol (FARXIGA) 10 mg tablet Take 1 tablet (10 mg total) by mouth once daily.    glipiZIDE 5 MG TR24 Take 1 tablet (5 mg total) by mouth daily with breakfast.          Follow ADA diet.  Avoid soda, simple sweets, and limit rice/pasta/bread/starches and consume brown options when possible.   Maintain healthy weight with BMI goal <30.   Perform aerobic exercise for 150 minutes per week (or 5 days a week for 30 minutes each day).   Examine feet daily.     Lab Results   Component Value Date    HGBA1C 10.0 (H) 12/02/2024

## 2025-04-14 NOTE — PROGRESS NOTES
Marce Izaguirre, WALLY   [facility]  [address] [facility] [address]      PATIENT NAME: Paola mC  : 1960  DATE: 25  MRN: [id number]      Reason for Visit / Chief Complaint: Establish Care (Former [facility] patient )       History of Present Illness / Problem Focused Workflow     Paola Cm presents to the clinic with Establish Care (Former [facility]aled patient )     65 yo AAF here to establish care. Medical problems includes T2DM, HTN, HLD, Diabetic Retinopathy, bilateral knee osteo,  Followed by [facility], Ortho,     Cervical Cancer Screening:  Breast Cancer Screenin25 MMG Ordered  Colon Cancer Screenin24 Cologuard, 3 yr f/u   Osteoporosis Screenin2025 Vitamin D Level   Diabetic Screening: [facility]  STD Screenin25  Eye Screening: [facility]  Wellness Screenin2025  History of Present Illness  Patient presents today for follow-up She reports difficulty with mobility, specifically experiencing trouble getting in and out of the bathtub and lifting her feet while bathing. Pt unable to obtain injections in knees from ortho until A1C is at goal, will likely start Trulicty next week. She was previously on Ozempic but discontinued due to uncontrollable diarrhea. She currently takes Farxiga (Forxiga) and Glipizide for diabetes management. Pt had previously requested a walk in tub, in between provider did order it, I printed it for pt today for her to bring to [facility]. Requested handicap paperwork today. Will f/u next week via virtual for lab review and med refills.               Review of Systems     Review of Systems   Constitutional: Negative.    HENT: Negative.     Eyes: Negative.    Respiratory: Negative.     Cardiovascular: Negative.    Gastrointestinal: Negative.    Endocrine: Negative.    Genitourinary: Negative.    Neurological: Negative.    Psychiatric/Behavioral: Negative.            Medications and Allergies     Medications  Medication List with Changes/Refills   Current Medications    AMLODIPINE (NORVASC) 10 MG TABLET    Take 1 tablet (10 mg total) by mouth once daily.    ATORVASTATIN (LIPITOR) 80 MG TABLET    Take 1 tablet (80 mg total) by mouth once daily.    BLOOD SUGAR DIAGNOSTIC STRP    To check BG 2 times daily, to use with insurance preferred meter    BLOOD-GLUCOSE METER KIT    To check BG 2 times daily, to use with insurance preferred meter    DAPAGLIFLOZIN PROPANEDIOL (FARXIGA) 10 MG TABLET    Take 1 tablet (10 mg total) by mouth once daily.    GLIPIZIDE 5 MG TR24    Take 1 tablet (5 mg total) by mouth daily with breakfast.    LANCETS MISC    To check BG 2 times daily, to use with insurance preferred meter    MEDICAL SUPPLY, MISCELLANEOUS (BLOOD PRESSURE CUFF MISC)    USE TWICE DAILY AS NEEDED TO check blood pressure    VALSARTAN (DIOVAN) 80 MG TABLET    Take 1 tablet (80 mg total) by mouth once daily.   Discontinued Medications    ERGOCALCIFEROL (ERGOCALCIFEROL) 50,000 UNIT CAP    Take 50,000 Units by mouth every 7 days.    PHENYLEPHRINE HCL 2.5% (MYDFRIN) 2.5 % OPHTHALMIC SOLUTION    Apply 1 drop to eye once.    PROPARACAINE (ALCAINE) 0.5 % OPHTHALMIC SOLUTION    Apply 1 drop to eye once.    TROPICAMIDE 1% (MYDRIACYL) 1 % DROP    Place 1 drop into both eyes once.         Allergies  Review of patient's allergies indicates:   Allergen Reactions    Latex Itching       Physical Examination     Vitals:    04/14/25 1031   BP: 135/85   Pulse: 87   Resp: 18   Temp: (!) 45.7 °F (7.6 °C)     Physical Exam  Vitals reviewed.   Constitutional:       Appearance: Normal appearance. She is normal weight.   HENT:      Head: Normocephalic.   Cardiovascular:      Rate and Rhythm: Normal rate and regular rhythm.      Pulses: Normal pulses.      Heart sounds: Normal heart sounds.   Pulmonary:      Effort: Pulmonary effort is normal.      Breath sounds: Normal breath sounds.   Abdominal:       General: Abdomen is flat.      Palpations: Abdomen is soft.   Musculoskeletal:         General: Normal range of motion.      Cervical back: Normal range of motion.   Skin:     General: Skin is warm and dry.   Neurological:      Mental Status: She is alert.   Psychiatric:         Mood and Affect: Mood normal.           Results     Lab Results   Component Value Date    WBC 10.0 07/08/2022    RBC 5.09 07/08/2022    HGB 13.7 07/08/2022    HCT 42.8 07/08/2022    MCV 84.1 07/08/2022    MCH 26.9 (L) 07/08/2022    MCHC 32.0 (L) 07/08/2022    RDW 12.6 07/08/2022     07/08/2022    MPV 12.3 (H) 07/08/2022     Sodium   Date Value Ref Range Status   04/03/2024 137 136 - 145 mmol/L Final   02/22/2024 135 (L) 136 - 145 mmol/L Final     Potassium   Date Value Ref Range Status   04/03/2024 3.7 3.5 - 5.1 mmol/L Final   02/22/2024 4.4 3.5 - 5.1 mmol/L Final     Chloride   Date Value Ref Range Status   04/03/2024 101 98 - 107 mmol/L Final   02/22/2024 100 100 - 109 mmol/L Final     CO2   Date Value Ref Range Status   04/03/2024 27 23 - 31 mmol/L Final     Carbon Dioxide   Date Value Ref Range Status   02/22/2024 27 22 - 33 mmol/L Final     Blood Urea Nitrogen   Date Value Ref Range Status   04/03/2024 14.7 9.8 - 20.1 mg/dL Final   02/22/2024 12 5 - 25 mg/dL Final     Creatinine   Date Value Ref Range Status   04/03/2024 1.15 (H) 0.55 - 1.02 mg/dL Final   02/22/2024 1.04 0.57 - 1.25 mg/dL Final     Calcium   Date Value Ref Range Status   04/03/2024 10.4 (H) 8.4 - 10.2 mg/dL Final   02/22/2024 9.5 8.8 - 10.6 mg/dL Final     Albumin   Date Value Ref Range Status   10/10/2023 3.6 3.4 - 4.8 g/dL Final     Bilirubin Total   Date Value Ref Range Status   10/10/2023 0.5 <=1.5 mg/dL Final     ALP   Date Value Ref Range Status   10/10/2023 166 (H) 40 - 150 unit/L Final     AST   Date Value Ref Range Status   10/10/2023 16 5 - 34 unit/L Final     ALT   Date Value Ref Range Status   10/10/2023 16 0 - 55 unit/L Final     Anion Gap   Date  Value Ref Range Status   02/22/2024 8 8 - 16 mmol/L Final     eGFR    Date Value Ref Range Status   02/22/2024 61 mL/min/1.73mSq Final     Comment:     In accordance with NKF-ASN Task Force recommendation, calculation based on the Chronic Kidney Disease Epidemiology Collaboration (CKD-EPI) equation without adjustment for race. eGFR adjusted for gender and age and calculated in ml/min/1.73mSquared. eGFR cannot be calculated if patient is under 18 years of age.     Reference Range:   >= 60 ml/min/1.73mSquared.     Estimated GFR-Non    Date Value Ref Range Status   02/28/2022 61 >=90      Lab Results   Component Value Date    CHOL 305 (H) 11/08/2023     Lab Results   Component Value Date    HDL 37 11/08/2023     Lab Results   Component Value Date    TRIG 293 (H) 11/08/2023     Lab Results   Component Value Date    VLDL 59 11/08/2023     Lab Results   Component Value Date    .00 (H) 11/08/2023     Lab Results   Component Value Date    TSH 0.3899 07/08/2022     Lab Results   Component Value Date    PHUR 5.0 11/01/2024    PROTEINUA Negative 11/01/2024    GLUCUA 4+ (A) 11/01/2024    OCCULTUA Negative 11/01/2024    NITRITE Negative 11/01/2024    LEUKOCYTESUR Negative 11/01/2024     Lab Results   Component Value Date    HGBA1C 10.0 (H) 12/02/2024    HGBA1C 12.1 (H) 09/27/2024    HGBA1C >14.0 (H) 04/03/2024           Assessment         ICD-10-CM ICD-9-CM   1. Wellness examination  Z00.00 V70.0   2. Type 2 diabetes mellitus with hyperglycemia, without long-term current use of insulin  E11.65 250.00     790.29   3. Screening for HIV (human immunodeficiency virus)  Z11.4 V73.89   4. Screening examination for STD (sexually transmitted disease)  Z11.3 V74.5   5. Breast cancer screening by mammogram  Z12.31 V76.12       Plan      1. Wellness examination  Assessment & Plan:  Pt wellness visit completed today with appropriate lab work.    Topics Reviewed / Updated  Immunizations  Discussed  Dicussed Healthy Diet &   Encouraged to exercise 3 x weekly  Increase Water Intake  Eat more fruits and vegetables  Avoid soda & alcohol      Orders:  -     T4, Free; Future; Expected date: 04/14/2025  -     TSH; Future; Expected date: 04/14/2025  -     Vitamin D; Future; Expected date: 04/14/2025  -     Comprehensive Metabolic Panel; Future; Expected date: 04/14/2025  -     Urinalysis, Reflex to Urine Culture; Future; Expected date: 04/14/2025  -     Lipid Panel; Future; Expected date: 04/14/2025  -     CBC Auto Differential; Future; Expected date: 04/14/2025    2. Type 2 diabetes mellitus with hyperglycemia, without long-term current use of insulin  Assessment & Plan:  A1C Today  F/U next week for lab review  Diabetes Medications              dapagliflozin propanediol (FARXIGA) 10 mg tablet Take 1 tablet (10 mg total) by mouth once daily.    glipiZIDE 5 MG TR24 Take 1 tablet (5 mg total) by mouth daily with breakfast.          Follow ADA diet.  Avoid soda, simple sweets, and limit rice/pasta/bread/starches and consume brown options when possible.   Maintain healthy weight with BMI goal <30.   Perform aerobic exercise for 150 minutes per week (or 5 days a week for 30 minutes each day).   Examine feet daily.     Lab Results   Component Value Date    HGBA1C 10.0 (H) 12/02/2024         Orders:  -     Hemoglobin A1C; Future; Expected date: 04/14/2025  -     Microalbumin/Creatinine Ratio, Urine; Future; Expected date: 04/14/2025    3. Screening for HIV (human immunodeficiency virus)  -     HIV 1/2 Ag/Ab (4th Gen); Future; Expected date: 04/14/2025    4. Screening examination for STD (sexually transmitted disease)  -     Hepatitis Panel, Acute; Future; Expected date: 04/14/2025    5. Breast cancer screening by mammogram  -     Mammo Digital Screening Bilat w/ Peyman (XPD); Future; Expected date: 05/02/2025         Future Appointments   Date Time Provider Department Center   4/14/2025 11:20 AM LAB, [facility]  [facility]   4/23/2025  1:40 PM Marce Izaguirre, JULIOP [facility]            Signature:     [facility]  [address] [facility] [address]    Date of encounter: 4/14/25    This note was generated with the assistance of ambient listening technology. Verbal consent was obtained by the patient and accompanying visitor(s) for the recording of patient appointment to facilitate this note. I attest to having reviewed and edited the generated note for accuracy, though some syntax or spelling errors may persist. Please contact the author of this note for any clarification.

## 2025-04-14 NOTE — LETTER
I certify that (Name) Paola Cm meets the requirements as outlined in # 1 (shown on reverse side) and qualifies for a mobility impaired license plate/hang-tag. I further understand that willful and false certification shall subject me to fines/imprisonment as outlined in R.S. 47:463.4 (G) (4). The applicant's information is as follows:    YOB: 1960            Race:Black or         Gender:Female    Address:  [address] [place] [address] A    City:[place]                               State:[place]     Zip Code:[address]     []Permanently Impaired - Applicant has a total or lifelong condition of mobility impairment from which little or no improvement or recovery can reasonably be expected. A medical examiners certification is required on initial application only.      [x] Temporarily Impaired - Applicant has a temporary condition of mobility impairment of which improvement or recovery can reasonably be expected. Applicant is entitled to a hangtag, which will be valid for one (1) year. A medical examiners certification is required for the renewal of the hangtag      [] Unable to appear in person at the Office of Motor Vehicles - Applicant must bring facial photo        Medical Examiner's Signature________________________________________ Date:4/14/25_________________________    Printed Name:__Marce Izaguirre_________________________________    State License #[id number]________    Address: [address] [place] [address]___                                     Phone Number: [phone]                                                                                                                                                                                                                  City: [place]__________________________________ State: [place] __Zip Code: [address] _______________    TO BE COMPLETED BY MOTOR VEHICLE ANALYST ONLY    PAOLA   Lic. Plate #      Hangtag Control #   Hangtag ID #      Date  Issued:    #:   Office #:

## 2025-04-16 ENCOUNTER — TELEPHONE (OUTPATIENT)
Dept: INTERNAL MEDICINE | Facility: CLINIC | Age: 65
End: 2025-04-16
Payer: MEDICAID

## 2025-04-16 LAB — BACTERIA UR CULT: ABNORMAL

## 2025-04-16 RX ORDER — CIPROFLOXACIN 500 MG/1
500 TABLET, FILM COATED ORAL EVERY 12 HOURS
Qty: 14 TABLET | Refills: 0 | Status: SHIPPED | OUTPATIENT
Start: 2025-04-16 | End: 2025-04-23

## 2025-04-16 NOTE — PROGRESS NOTES
Please inform the patient that while we will discuss her lab work results at her upcoming Office visit, her urine sample did grow out a bacteria indicating a UTI. I have sent a prescription for her to the pharmacy for her to start taking. Also, advise the following.    Start antibiotics as prescribed.   Complete the full course of the medication.  Report any continuing signs such as nausea/vomiting,visible blood in urine, increased low back or flank pain, worsening burning upon urination after antibiotic completion or fever.  Drink plenty of water.  Avoid soda or carbonated beverages.   Urinate frequently; do not hold urine for extended periods of time.  Wear cotton underwear, avoid tight fitting pants.  Use OTC AZO or pyridium for relief of urinary spasms.  Women: wipe front to back, urinate after sexual intercourse, and avoid scented or irritating feminine products.    Thanks,    Marce Izaguirre, WALLY

## 2025-04-16 NOTE — TELEPHONE ENCOUNTER
I contacted patient.   I informed provider WALLY Byrd reviewed     lab       with findings above.   New order : medication sent to pharmacy   Patient voiced understanding.

## 2025-04-16 NOTE — TELEPHONE ENCOUNTER
----- Message from WALLY Byrd sent at 4/16/2025  7:29 AM CDT -----  Please inform the patient that while we will discuss her lab work results at her upcoming Office visit, her urine sample did grow out a bacteria indicating a UTI. I have sent a prescription for her   to the pharmacy for her to start taking. Also, advise the following.    Start antibiotics as prescribed.   Complete the full course of the medication.  Report any continuing signs such as nausea/vomiting,visible blood in urine, increased low back or flank pain, worsening burning upon urination after antibiotic completion or fever.  Drink plenty of water.  Avoid soda or carbonated beverages.   Urinate frequently; do not hold urine for extended periods of time.  Wear cotton underwear, avoid tight fitting pants.  Use OTC AZO or pyridium for relief of urinary spasms.  Women: wipe front to back, urinate after sexual intercourse, and avoid scented or irritating feminine products.    Thanks,    WALLY Byrd     ----- Message -----  From: Lab, Background User  Sent: 4/14/2025  11:32 AM CDT  To: WALLY Omalley

## 2025-04-24 ENCOUNTER — TELEPHONE (OUTPATIENT)
Dept: INTERNAL MEDICINE | Facility: CLINIC | Age: 65
End: 2025-04-24
Payer: MEDICAID

## 2025-05-02 ENCOUNTER — HOSPITAL ENCOUNTER (OUTPATIENT)
Dept: RADIOLOGY | Facility: HOSPITAL | Age: 65
Discharge: HOME | End: 2025-05-02
Attending: NURSE PRACTITIONER
Payer: COMMERCIAL

## 2025-05-02 DIAGNOSIS — Z12.31 BREAST CANCER SCREENING BY MAMMOGRAM: ICD-10-CM

## 2025-05-02 PROCEDURE — 77067 SCR MAMMO BI INCL CAD: CPT | Mod: 26,,, | Performed by: RADIOLOGY

## 2025-05-02 PROCEDURE — 77067 SCR MAMMO BI INCL CAD: CPT | Mod: TC

## 2025-05-02 PROCEDURE — 77063 BREAST TOMOSYNTHESIS BI: CPT | Mod: 26,,, | Performed by: RADIOLOGY

## 2025-05-22 ENCOUNTER — TELEPHONE (OUTPATIENT)
Dept: INTERNAL MEDICINE | Facility: CLINIC | Age: 65
End: 2025-05-22
Payer: COMMERCIAL

## 2025-05-22 NOTE — TELEPHONE ENCOUNTER
Please help pt schedule sooner appt if available. Thank you!!    Copied from CRM #[id number]. Topic: Appointments - Appointment Scheduling  >> May 20, 2025 12:46 PM Jennyfer wrote:  Who Called: Paola Cm    Patient is returning phone call    Who Left Message for Patient:??    Does the patient know what this is regarding?: schedule a virtual appt    Preferred Method of Contact: Phone Call    Patient's Preferred Phone Number on File: [phone]     Best Call Back Number, if different: n/a    Additional Information: n/a

## 2025-05-23 ENCOUNTER — TELEPHONE (OUTPATIENT)
Dept: INTERNAL MEDICINE | Facility: CLINIC | Age: 65
End: 2025-05-23

## 2025-05-23 NOTE — TELEPHONE ENCOUNTER
Copied from CRM #[id number]. Topic: General Inquiry - Return Call  >> May 23, 2025  9:58 AM Andrei Samayoa wrote:  Who Called: Paola Cm    Patient is returning phone call    Who Left Message for Patient:N/A  Does the patient know what this is regarding?:N/A      Preferred Method of Contact: Phone Call  Patient's Preferred Phone Number on File: [phone]   Best Call Back Number, if different:  Additional Information: pt stated provider was trying to call. Pt disconnected call while on hold

## 2025-05-30 ENCOUNTER — OFFICE VISIT (OUTPATIENT)
Dept: INTERNAL MEDICINE | Facility: CLINIC | Age: 65
End: 2025-05-30
Payer: COMMERCIAL

## 2025-05-30 DIAGNOSIS — N18.31 TYPE 2 DIABETES MELLITUS WITH STAGE 3A CHRONIC KIDNEY DISEASE, WITHOUT LONG-TERM CURRENT USE OF INSULIN: Primary | ICD-10-CM

## 2025-05-30 DIAGNOSIS — E11.22 TYPE 2 DIABETES MELLITUS WITH STAGE 3A CHRONIC KIDNEY DISEASE, WITHOUT LONG-TERM CURRENT USE OF INSULIN: Primary | ICD-10-CM

## 2025-05-30 DIAGNOSIS — E55.9 VITAMIN D DEFICIENCY: ICD-10-CM

## 2025-05-30 NOTE — ASSESSMENT & PLAN NOTE
Educated on increasing foods high in Vitamin D such as fish oil, cod liver oil, salmon, milk fortified with vitamin D.  Vitamin D 2000 I.U. tablets daily (purchase over the counter).  Repeat Vitamin D level as ordered.    Lab Results   Component Value Date    [id number] 20 (L) 04/14/2025

## 2025-05-30 NOTE — ASSESSMENT & PLAN NOTE
A1C above goal  Continue RX glipizide 10 mg daily, Farxiga 10 mg daily, & newly started Tradjenta 5 mg daily   2-3 months f/u with labs via virtual   Diabetes Medications              dapagliflozin propanediol (FARXIGA) 10 mg tablet Take 1 tablet (10 mg total) by mouth once daily.    glipiZIDE 5 MG TR24 Take 1 tablet (5 mg total) by mouth daily with breakfast.          Follow ADA diet.  Avoid soda, simple sweets, and limit rice/pasta/bread/starches and consume brown options when possible.   Maintain healthy weight with BMI goal <30.   Perform aerobic exercise for 150 minutes per week (or 5 days a week for 30 minutes each day).   Examine feet daily.     Lab Results   Component Value Date    HGBA1C 11.3 (H) 04/14/2025    HGBA1C 11.3 (H) 04/14/2025     GFR Decreased  Pending Referral to Renal at [facility]   Follow renoprotective measures including Renal Diet (reduce intake of nuts, peanut butter, milk, cheese, dried beans, peas), low protein, and Low Sodium Diet (less than 2 grams per day).  Avoid NSAIDs (Aleve, Mobic, Celebrex, Ibuprofen, Advil, Toradol and Diclofenac). May take Tylenol as needed for headache/pain.  If Applicable: Control DM with goal A1C <7. BP goal <130/80. LDL goal < 100.  Stay well hydrated. Avoid alcohol and soda. Limit tea and coffee.  Smoking Cessation recommended.    Lab Results   Component Value Date    EGFRNORACEVR 51 04/14/2025     Lab Results   Component Value Date    BUN 23.9 (H) 04/14/2025     Lab Results   Component Value Date    CREATININE 1.19 (H) 04/14/2025

## 2025-05-30 NOTE — PROGRESS NOTES
Marce Izaguirre, WALLY   [facility]  [address] [facility] [address]      PATIENT NAME: Paola Cm  : 1960  DATE: 25  MRN: [id number]      Reason for Visit / Chief Complaint: Diabetes       History of Present Illness / Problem Focused Workflow     Paola Cm presents to the clinic with Diabetes     65 yo AAF here to establish care. Medical problems includes T2DM, HTN, HLD, Diabetic Retinopathy, bilateral knee osteo,  Followed by [facility], Ortho,      Cervical Cancer Screening:  Breast Cancer Screenin25 MMG Ordered  Colon Cancer Screenin24 Cologuard, 3 yr f/u   Osteoporosis Screenin2025 Vitamin D Level   Diabetic Screening: [facility]  STD Screenin25  Eye Screening: [facility]  Wellness Screenin2025  History of Present Illness  Patient presents today for follow-up She reports difficulty with mobility, specifically experiencing trouble getting in and out of the bathtub and lifting her feet while bathing. Pt unable to obtain injections in knees from ortho until A1C is at goal, will likely start Trulicty next week. She was previously on Ozempic but discontinued due to uncontrollable diarrhea. She currently takes Farxiga (Forxiga) and Glipizide for diabetes management. Pt had previously requested a walk in tub, in between provider did order it, I printed it for pt today for her to bring to [facility]. Requested handicap paperwork today. Will f/u next week via virtual for lab review and med refills.     2025  History of Present Illness  Patient presents today for follow up of lab results She reports continued consumption of regular soda despite monitoring diet. She continues Farxiga for diabetes and kidney protection, and glipizide 10 mg daily. She has not yet started newly prescribed Tradjenta from [facility]. She was unaware of having chronic kidney disease. GFR has  been trending downward from 58 in 2023 to 54 in 2024, and most recently to 51. Will discuss referral to Renal at NOV. Complete blood count and electrolytes were normal. Glucose and A1C were elevated, with A1C at 11.3. Cholesterol was 191. Vitamin D was low. Thyroid levels and hepatitis screening were normal. Mammogram in May was normal. She previously took vitamin D supplementation that was discontinued as directed.  Will f/u in 2 months with labs prn.               Review of Systems     Review of Systems   Constitutional:  Negative for activity change and unexpected weight change.   HENT:  Negative for hearing loss, rhinorrhea and trouble swallowing.    Eyes:  Positive for visual disturbance. Negative for discharge.   Respiratory:  Negative for chest tightness and wheezing.    Cardiovascular:  Negative for chest pain and palpitations.   Gastrointestinal:  Positive for constipation. Negative for blood in stool, diarrhea and vomiting.   Endocrine: Positive for polydipsia and polyuria.   Genitourinary:  Negative for difficulty urinating, dysuria, hematuria and menstrual problem.   Musculoskeletal:  Positive for arthralgias. Negative for joint swelling and neck pain.   Neurological:  Negative for weakness and headaches.   Psychiatric/Behavioral:  Negative for confusion and dysphoric mood.          Medications and Allergies     Medications  Medication List with Changes/Refills   Current Medications    AMLODIPINE (NORVASC) 10 MG TABLET    Take 1 tablet (10 mg total) by mouth once daily.    ATORVASTATIN (LIPITOR) 80 MG TABLET    Take 1 tablet (80 mg total) by mouth once daily.    BLOOD SUGAR DIAGNOSTIC STRP    To check BG 2 times daily, to use with insurance preferred meter    BLOOD-GLUCOSE METER KIT    To check BG 2 times daily, to use with insurance preferred meter    DAPAGLIFLOZIN PROPANEDIOL (FARXIGA) 10 MG TABLET    Take 1 tablet (10 mg total) by mouth once daily.    GLIPIZIDE 5 MG TR24    Take 2 tablets (10 mg  total) by mouth daily with breakfast.    LANCETS MISC    To check BG 2 times daily, to use with insurance preferred meter    LINAGLIPTIN (TRADJENTA) 5 MG TAB TABLET    Take 1 tablet (5 mg total) by mouth once daily.    MEDICAL SUPPLY, MISCELLANEOUS (BLOOD PRESSURE CUFF MISC)    USE TWICE DAILY AS NEEDED TO check blood pressure    VALSARTAN (DIOVAN) 80 MG TABLET    Take 1 tablet (80 mg total) by mouth once daily.         Allergies  Review of patient's allergies indicates:   Allergen Reactions    Latex Itching       Physical Examination   There were no vitals filed for this visit.  Physical Exam  HENT:      Right Ear: Hearing normal.      Left Ear: Hearing normal.   Neurological:      Mental Status: She is alert and oriented to person, place, and time.   Psychiatric:         Mood and Affect: Mood normal.           Results     Lab Results   Component Value Date    WBC 11.35 04/14/2025    RBC 4.79 04/14/2025    HGB 13.5 04/14/2025    HCT 41.6 04/14/2025    MCV 86.8 04/14/2025    MCH 28.2 04/14/2025    MCHC 32.5 (L) 04/14/2025    RDW 12.8 04/14/2025     04/14/2025    MPV 11.9 (H) 04/14/2025     Sodium   Date Value Ref Range Status   04/14/2025 137 136 - 145 mmol/L Final   02/22/2024 135 (L) 136 - 145 mmol/L Final     Potassium   Date Value Ref Range Status   04/14/2025 4.1 3.5 - 5.1 mmol/L Final   02/22/2024 4.4 3.5 - 5.1 mmol/L Final     Chloride   Date Value Ref Range Status   04/14/2025 105 98 - 107 mmol/L Final   02/22/2024 100 100 - 109 mmol/L Final     CO2   Date Value Ref Range Status   04/14/2025 23 23 - 31 mmol/L Final     Carbon Dioxide   Date Value Ref Range Status   02/22/2024 27 22 - 33 mmol/L Final     Glucose   Date Value Ref Range Status   04/14/2025 370 (H) 82 - 115 mg/dL Final     Blood Urea Nitrogen   Date Value Ref Range Status   04/14/2025 23.9 (H) 9.8 - 20.1 mg/dL Final   02/22/2024 12 5 - 25 mg/dL Final     Creatinine   Date Value Ref Range Status   04/14/2025 1.19 (H) 0.55 - 1.02 mg/dL  Final   02/22/2024 1.04 0.57 - 1.25 mg/dL Final     Calcium   Date Value Ref Range Status   04/14/2025 10.0 8.4 - 10.2 mg/dL Final   02/22/2024 9.5 8.8 - 10.6 mg/dL Final     Protein Total   Date Value Ref Range Status   04/14/2025 8.9 (H) 5.8 - 7.6 gm/dL Final     Albumin   Date Value Ref Range Status   04/14/2025 3.5 3.4 - 4.8 g/dL Final     Bilirubin Total   Date Value Ref Range Status   04/14/2025 0.5 <=1.5 mg/dL Final     ALP   Date Value Ref Range Status   04/14/2025 206 (H) 40 - 150 unit/L Final     AST   Date Value Ref Range Status   04/14/2025 19 11 - 45 unit/L Final     ALT   Date Value Ref Range Status   04/14/2025 19 0 - 55 unit/L Final     Anion Gap   Date Value Ref Range Status   02/22/2024 8 8 - 16 mmol/L Final     eGFR    Date Value Ref Range Status   02/22/2024 61 mL/min/1.73mSq Final     Comment:     In accordance with NKF-ASN Task Force recommendation, calculation based on the Chronic Kidney Disease Epidemiology Collaboration (CKD-EPI) equation without adjustment for race. eGFR adjusted for gender and age and calculated in ml/min/1.73mSquared. eGFR cannot be calculated if patient is under 18 years of age.     Reference Range:   >= 60 ml/min/1.73mSquared.     Estimated GFR-Non    Date Value Ref Range Status   02/28/2022 61 >=90      Lab Results   Component Value Date    CHOL 191 04/14/2025     Lab Results   Component Value Date    HDL 33 (L) 04/14/2025     Lab Results   Component Value Date    TRIG 157 (H) 04/14/2025     Lab Results   Component Value Date    VLDL 31 04/14/2025     Lab Results   Component Value Date    .00 04/14/2025     Lab Results   Component Value Date    TSH 0.827 04/14/2025     Lab Results   Component Value Date    PHUR 5.0 04/14/2025    PROTEINUA Trace (A) 04/14/2025    GLUCUA 4+ (A) 04/14/2025    OCCULTUA 1+ (A) 04/14/2025    NITRITE Negative 04/14/2025    LEUKOCYTESUR 500 (A) 04/14/2025     Lab Results   Component Value Date    HGBA1C  11.3 (H) 04/14/2025    HGBA1C 11.3 (H) 04/14/2025    HGBA1C 10.0 (H) 12/02/2024           Assessment         ICD-10-CM ICD-9-CM   1. Type 2 diabetes mellitus with stage 3a chronic kidney disease, without long-term current use of insulin  E11.22 250.40    N18.31 585.3   2. Vitamin D deficiency  E55.9 268.9       Plan      1. Type 2 diabetes mellitus with stage 3a chronic kidney disease, without long-term current use of insulin  Assessment & Plan:  A1C above goal  Continue RX glipizide 10 mg daily, Farxiga 10 mg daily, & newly started Tradjenta 5 mg daily   2-3 months f/u with labs via virtual   Diabetes Medications              dapagliflozin propanediol (FARXIGA) 10 mg tablet Take 1 tablet (10 mg total) by mouth once daily.    glipiZIDE 5 MG TR24 Take 1 tablet (5 mg total) by mouth daily with breakfast.          Follow ADA diet.  Avoid soda, simple sweets, and limit rice/pasta/bread/starches and consume brown options when possible.   Maintain healthy weight with BMI goal <30.   Perform aerobic exercise for 150 minutes per week (or 5 days a week for 30 minutes each day).   Examine feet daily.     Lab Results   Component Value Date    HGBA1C 11.3 (H) 04/14/2025    HGBA1C 11.3 (H) 04/14/2025     GFR Decreased  Pending Referral to Renal at NOV   Follow renoprotective measures including Renal Diet (reduce intake of nuts, peanut butter, milk, cheese, dried beans, peas), low protein, and Low Sodium Diet (less than 2 grams per day).  Avoid NSAIDs (Aleve, Mobic, Celebrex, Ibuprofen, Advil, Toradol and Diclofenac). May take Tylenol as needed for headache/pain.  If Applicable: Control DM with goal A1C <7. BP goal <130/80. LDL goal < 100.  Stay well hydrated. Avoid alcohol and soda. Limit tea and coffee.  Smoking Cessation recommended.    Lab Results   Component Value Date    EGFRNORACEVR 51 04/14/2025     Lab Results   Component Value Date    BUN 23.9 (H) 04/14/2025     Lab Results   Component Value Date    CREATININE 1.19 (H)  04/14/2025           Orders:  -     Urinalysis, Reflex to Urine Culture; Future; Expected date: 07/30/2025  -     Basic Metabolic Panel; Future; Expected date: 07/30/2025  -     Hemoglobin A1C; Future; Expected date: 07/30/2025    2. Vitamin D deficiency  Assessment & Plan:  Educated on increasing foods high in Vitamin D such as fish oil, cod liver oil, salmon, milk fortified with vitamin D.  Vitamin D 2000 I.U. tablets daily (purchase over the counter).  Repeat Vitamin D level as ordered.    Lab Results   Component Value Date    [id number] 20 (L) 04/14/2025                No future appointments.           Signature:     [facility]  [address] [facility] [address]    Date of encounter: 5/30/25    This note was generated with the assistance of ambient listening technology. Verbal consent was obtained by the patient and accompanying visitor(s) for the recording of patient appointment to facilitate this note. I attest to having reviewed and edited the generated note for accuracy, though some syntax or spelling errors may persist. Please contact the author of this note for any clarification.    The patient location is: home  The chief complaint leading to consultation is: DM    Visit type: audiovisual    Face to Face time with patient: 20  30 minutes of total time spent on the encounter, which includes face to face time and non-face to face time preparing to see the patient (eg, review of tests), Obtaining and/or reviewing separately obtained history, Documenting clinical information in the electronic or other health record, Independently interpreting results (not separately reported) and communicating results to the patient/family/caregiver, or Care coordination (not separately reported).         Each patient to whom he or she provides medical services by telemedicine is:  (1) informed of the relationship between the physician and patient and the  respective role of any other health care provider with respect to management of the patient; and (2) notified that he or she may decline to receive medical services by telemedicine and may withdraw from such care at any time.    Notes:

## 2025-06-23 ENCOUNTER — TELEPHONE (OUTPATIENT)
Dept: ORTHOPEDICS | Facility: CLINIC | Age: 65
End: 2025-06-23
Payer: COMMERCIAL

## 2025-06-23 NOTE — TELEPHONE ENCOUNTER
Spoke To Pt. And Notified her of Brenda's response. We will get PA for Gel Shots instead. Pt verbalizes understanding.

## 2025-06-23 NOTE — TELEPHONE ENCOUNTER
Patient called stating that her recent A1c is 11 and she cannot do her injections due to this, but she  wants to know what else she can do to help with the pain in both her knees. Denies trying tylenol/ibuprofen OTC-states its not that kind of pain for that. States her pain level is about 9/10 at this time. Reports difficulty getting around, especially when having to lift legs upwards. Would like a call back from the nurse to discuss. Please advise.    [phone]

## 2025-06-24 ENCOUNTER — TELEPHONE (OUTPATIENT)
Dept: INTERNAL MEDICINE | Facility: CLINIC | Age: 65
End: 2025-06-24
Payer: COMMERCIAL

## 2025-06-24 ENCOUNTER — TELEPHONE (OUTPATIENT)
Dept: ORTHOPEDICS | Facility: CLINIC | Age: 65
End: 2025-06-24
Payer: COMMERCIAL

## 2025-06-24 NOTE — TELEPHONE ENCOUNTER
----- Message from Brenda Perez NP sent at 6/24/2025 12:26 PM CDT -----  Regarding: Prior Authorization for Viscosupplementation  Please obtain prior authorization for viscosupplementation.  After obtaining, schedule a patient for procedure only appointment as appropriate with NP  (Euflexxa/Orthovisc/Hyalgan/Synvisc 1 visit per week for 3 weeks or Durolane 1 visit)    Laterality: bilateral  Estimated Return to Clinic:  ASAP (procedure only)

## 2025-06-24 NOTE — TELEPHONE ENCOUNTER
Copied from CRM #[id number]. Topic: Medications - Medication Refill  >> Jun 23, 2025  3:17 PM Michaela Frances wrote:  Who Called: Paola Cm    Caller is requesting assistance/information from provider's office.    Symptoms (please be specific):    How long has patient had these symptoms:    List of preferred pharmacies on file (remove unneeded): [facility]  If different, enter pharmacy into here including location and phone number:       Preferred Method of Contact: Phone Call  Patient's Preferred Phone Number on File: [phone]   Best Call Back Number, if different:  Additional Information: pt is requesting a walk in bath tub. Please advise

## 2025-08-22 ENCOUNTER — TELEPHONE (OUTPATIENT)
Dept: INTERNAL MEDICINE | Facility: CLINIC | Age: 65
End: 2025-08-22
Payer: COMMERCIAL

## 2025-08-26 ENCOUNTER — TELEPHONE (OUTPATIENT)
Dept: INTERNAL MEDICINE | Facility: CLINIC | Age: 65
End: 2025-08-26
Payer: COMMERCIAL

## 2025-08-28 ENCOUNTER — TELEPHONE (OUTPATIENT)
Dept: INTERNAL MEDICINE | Facility: CLINIC | Age: 65
End: 2025-08-28
Payer: COMMERCIAL